# Patient Record
Sex: MALE | Race: WHITE | NOT HISPANIC OR LATINO | Employment: FULL TIME | URBAN - METROPOLITAN AREA
[De-identification: names, ages, dates, MRNs, and addresses within clinical notes are randomized per-mention and may not be internally consistent; named-entity substitution may affect disease eponyms.]

---

## 2017-10-23 ENCOUNTER — RADIANT APPOINTMENT (OUTPATIENT)
Dept: GENERAL RADIOLOGY | Facility: CLINIC | Age: 47
End: 2017-10-23
Attending: INTERNAL MEDICINE
Payer: OTHER MISCELLANEOUS

## 2017-10-23 ENCOUNTER — OFFICE VISIT (OUTPATIENT)
Dept: INTERNAL MEDICINE | Facility: CLINIC | Age: 47
End: 2017-10-23
Payer: OTHER MISCELLANEOUS

## 2017-10-23 VITALS
DIASTOLIC BLOOD PRESSURE: 78 MMHG | OXYGEN SATURATION: 96 % | TEMPERATURE: 97 F | WEIGHT: 282 LBS | HEART RATE: 74 BPM | HEIGHT: 73 IN | SYSTOLIC BLOOD PRESSURE: 135 MMHG | BODY MASS INDEX: 37.37 KG/M2

## 2017-10-23 DIAGNOSIS — M25.521 RIGHT ELBOW PAIN: ICD-10-CM

## 2017-10-23 DIAGNOSIS — M79.645 PAIN OF LEFT MIDDLE FINGER: ICD-10-CM

## 2017-10-23 DIAGNOSIS — M25.511 RIGHT SHOULDER PAIN, UNSPECIFIED CHRONICITY: ICD-10-CM

## 2017-10-23 DIAGNOSIS — M25.511 RIGHT SHOULDER PAIN, UNSPECIFIED CHRONICITY: Primary | ICD-10-CM

## 2017-10-23 PROCEDURE — 99203 OFFICE O/P NEW LOW 30 MIN: CPT | Performed by: INTERNAL MEDICINE

## 2017-10-23 PROCEDURE — 73030 X-RAY EXAM OF SHOULDER: CPT | Mod: RT

## 2017-10-23 PROCEDURE — 73070 X-RAY EXAM OF ELBOW: CPT | Mod: RT

## 2017-10-23 RX ORDER — KETOROLAC TROMETHAMINE 10 MG/1
10 TABLET, FILM COATED ORAL EVERY 6 HOURS PRN
Qty: 20 TABLET | Refills: 0 | Status: SHIPPED | OUTPATIENT
Start: 2017-10-23 | End: 2017-11-10

## 2017-10-23 NOTE — PATIENT INSTRUCTIONS
Please take the Toradol per package instructions. Do not take Aleve (naproxen), aspirin, ibuprofen (Motrin, Advil) or Excedrin on the same day that you take the Toradol.  Take it with food or milk and please drink enough fluids, 6-8 cups of non-caffeinated beverages a day.    We will let you know your test results as discussed: by phone for urgent results, otherwise by postal mail.    Let us know if your right shoulder, right elbow and left middle finger are not better in 2 weeks-we will likely then need to refer you to Orthopedics at that point.

## 2017-10-23 NOTE — PROGRESS NOTES
"  SUBJECTIVE:   Nish Cotto is a 47 year old male who presents to clinic today for the following health issues:      Musculoskeletal problem/pain-right shoulder    Duration: has had problems for years. It just got worse the last month, when the patient fell off the truck and hit his right elbow.     Description  Location: right     Intensity:  severe    Accompanying signs and symptoms: none    History  Previous similar problem: YES  Previous evaluation:  x-ray, ultrasound and MRI- torn rotator cuff was dxed for both shoulders.    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 Has not tried over the counter medications.    Weakness of the right arm-worse with exertion. Pain sometimes wakes patient up. No neck pain.   No numbness.     The elbow still hurts.  Right shoulder: had a \"blood patch\"-per patient, had \"blood injected into the shoulder to create scar tissue.\" Has not needed a steroid shot into the right  shoulder for years. Also, the *left* middle finger hurts. This finger has started to look slightly hyperflexed. No noted injury  No f/c.   No rashes.  No joint stiffness.       declined tetanus shot today.       Reviewed and updated as needed this visit by clinical staff  Tobacco  Allergies  Meds  Problems  Med Hx  Surg Hx  Fam Hx  Soc Hx        Reviewed and updated as needed this visit by Provider  Allergies  Meds  Problems         Patient Active Problem List   Diagnosis     Right shoulder pain, unspecified chronicity       History reviewed. No pertinent past medical history.    History reviewed. No pertinent surgical history.    Family History   Problem Relation Age of Onset     CANCER Father      Dementia Maternal Grandfather      CANCER Maternal Grandfather      CANCER Paternal Grandfather        Social History   Substance Use Topics     " "Smoking status: Former Smoker     Quit date: 10/23/2015     Smokeless tobacco: Never Used     Alcohol use Yes       Current Outpatient Prescriptions   Medication     ketorolac (TORADOL) 10 MG tablet     No current facility-administered medications for this visit.          ROS:  Constitutional, HEENT, cardiovascular, pulmonary, GI, , musculoskeletal, neuro, skin, endocrine and psych systems are negative, except as otherwise noted.     OBJECTIVE:                                                    /78  Pulse 74  Temp 97  F (36.1  C) (Oral)  Ht 6' 1.25\" (1.861 m)  Wt 282 lb (127.9 kg)  SpO2 96%  BMI 36.95 kg/m2     GENERAL APPEARANCE: healthy, alert and in no distress    MS:   right upper extremity:  Shoulder ROM was complete and painless, EXCEPT for pain on external rotation of the right shoulder.   Negative AC joint compression sign, no subacromial bursa tenderness, arm drop test within normal limits.  There was DECREASED strength on external rotation of the shoulder, with the patient's arms at their sides and the elbows in 90 degrees of flexion. No biceps tendon insertion tenderness. Spurling's test negative.  Phalen's sign was negative  Mild hyperextension of the 3rd digit of the left hand,w/o signs of inflammation  o.w., no musculoskeletal defects are noted and gait is age appropriate without ataxia  SKIN: no suspicious lesions or rashes  NEURO: mentation intact and speech normal  PSYCH: mentation appears normal and affect normal/bright.    No results found for this or any previous visit.    Recent Results (from the past 744 hour(s))   XR Shoulder Right G/E 3 Views    Narrative    XR SHOULDER RT G/E 3 VW 10/23/2017 9:36 AM    HISTORY: Pain in right shoulder      Impression    IMPRESSION: Negative.    JEFFERY HOWELL MD   XR Elbow Right 2 Views    Narrative    XR ELBOW RT 2 VW 10/23/2017 9:36 AM    HISTORY: Pain in right elbow      Impression    IMPRESSION: Negative.    JEFFERY HOWELL MD "       ASSESSMENT/PLAN:                                                        ICD-10-CM    1. Right shoulder pain, unspecified chronicity M25.511 XR Shoulder Right G/E 3 Views     ketorolac (TORADOL) 10 MG tablet   2. Right elbow pain M25.521 XR Elbow Right 2 Views     ketorolac (TORADOL) 10 MG tablet   3. Pain of left middle finger M79.645 ketorolac (TORADOL) 10 MG tablet     ASSESSMENT:  right upper extremity rotator cuff tendinopathy  PLAN:  As per orders above and patient instructions below.     may consider workup for Rheum disease should MSK issues persist    Patient Instructions   Please take the Toradol per package instructions. Do not take Aleve (naproxen), aspirin, ibuprofen (Motrin, Advil) or Excedrin on the same day that you take the Toradol.  Take it with food or milk and please drink enough fluids, 6-8 cups of non-caffeinated beverages a day.    We will let you know your test results as discussed: by phone for urgent results, otherwise by postal mail.    Let us know if your right shoulder, right elbow and left middle finger are not better in 2 weeks-we will likely then need to refer you to Orthopedics at that point.       Bertha Yip MD    [facility]  [address] [facility] [address] [phone]

## 2017-10-23 NOTE — NURSING NOTE
"Chief Complaint   Patient presents with     Shoulder Pain     has had problems in the past.       Initial /78  Pulse 74  Temp 97  F (36.1  C) (Oral)  Ht 6' 1.25\" (1.861 m)  Wt 282 lb (127.9 kg)  SpO2 96%  BMI 36.95 kg/m2 Estimated body mass index is 36.95 kg/(m^2) as calculated from the following:    Height as of this encounter: 6' 1.25\" (1.861 m).    Weight as of this encounter: 282 lb (127.9 kg).  Medication Reconciliation: complete    Jovita Corley CMA  "

## 2017-10-23 NOTE — LETTER
October 27, 2017      Nish Cotto  [address] [address] [place] [id number]        Dear ,    We are writing to inform you of your test results.    Your test results fall within the expected range(s) or remain unchanged from previous results.  Please continue with current treatment plan.    Resulted Orders   XR Shoulder Right G/E 3 Views    Narrative    XR SHOULDER RT G/E 3 VW 10/23/2017 9:36 AM    HISTORY: Pain in right shoulder      Impression    IMPRESSION: Negative.    JEFFERY HOWELL MD       If you have any questions or concerns, please call the clinic at the number listed above.       Sincerely,        [facility] XRAY ROOM 1

## 2017-10-23 NOTE — MR AVS SNAPSHOT
After Visit Summary   10/23/2017    Nish Cotto    MRN: [id number]           Patient Information     Date Of Birth          1970        Visit Information        Provider Department      10/23/2017 8:10 AM Bertha Yip MD [facility]        Today's Diagnoses     Right shoulder pain, unspecified chronicity    -  1    Right elbow pain        Pain of left middle finger          Care Instructions    Please take the Toradol per package instructions. Do not take Aleve (naproxen), aspirin, ibuprofen (Motrin, Advil) or Excedrin on the same day that you take the Toradol.  Take it with food or milk and please drink enough fluids, 6-8 cups of non-caffeinated beverages a day.    We will let you know your test results as discussed: by phone for urgent results, otherwise by postal mail.    Let us know if your right shoulder, right elbow and left middle finger are not better in 2 weeks-we will likely then need to refer you to Orthopedics at that point.           Follow-ups after your visit        Future tests that were ordered for you today     Open Future Orders        Priority Expected Expires Ordered    XR Shoulder Right G/E 3 Views Routine 10/23/2017 10/23/2018 10/23/2017    XR Elbow Right 2 Views Routine 10/23/2017 10/23/2018 10/23/2017            Who to contact     If you have questions or need follow up information about today's clinic visit or your schedule please contact [facility] directly at [phone].  Normal or non-critical lab and imaging results will be communicated to you by MyChart, letter or phone within 4 business days after the clinic has received the results. If you do not hear from us within 7 days, please contact the clinic through MyChart or phone. If you have a critical or abnormal lab result, we will notify you by phone as soon as possible.  Submit refill requests through [employer] or call your pharmacy and they will forward the refill request  "to us. Please allow 3 business days for your refill to be completed.          Additional Information About Your Visit        [employer] Information     [employer] lets you send messages to your doctor, view your test results, renew your prescriptions, schedule appointments and more. To sign up, go to [email or web address] . Click on \"Log in\" on the left side of the screen, which will take you to the Welcome page. Then click on \"Sign up Now\" on the right side of the page.     You will be asked to enter the access code listed below, as well as some personal information. Please follow the directions to create your username and password.     Your access code is: [email or web address]  Expires: 2018  8:46 AM     Your access code will  in 90 days. If you need help or a new code, please call your [facility] clinic or [phone].        Care EveryWhere ID     This is your [facility] EveryWhere ID. This could be used by other organizations to access your [facility] medical records  [id number]        Your Vitals Were     Pulse Temperature Height Pulse Oximetry BMI (Body Mass Index)       74 97  F (36.1  C) (Oral) 6' 1.25\" (1.861 m) 96% 36.95 kg/m2        Blood Pressure from Last 3 Encounters:   10/23/17 135/78    Weight from Last 3 Encounters:   10/23/17 282 lb (127.9 kg)                 Today's Medication Changes          These changes are accurate as of: 10/23/17  8:46 AM.  If you have any questions, ask your nurse or doctor.               Start taking these medicines.        Dose/Directions    ketorolac 10 MG tablet   Commonly known as:  TORADOL   Used for:  Right elbow pain, Right shoulder pain, unspecified chronicity, Pain of left middle finger   Started by:  Bertha Yip MD        Dose:  10 mg   Take 1 tablet (10 mg) by mouth every 6 hours as needed   Quantity:  20 tablet   Refills:  0            Where to get your medicines      These medications were sent to [facility]/pharmacy #[id number] - [place] - [id number] [address]   [address] [address] , [place] [address]     Phone:  [phone]     ketorolac 10 MG tablet                Primary Care Provider    Physician No Ref-Primary       NO REF-PRIMARY PHYSICIAN        Equal Access to Services     AMRIK ZIEGLER : Hadii ayad barbosa perlao Sokennyali, waaxda luqadaha, qaybta kaalmada adejpyada, woody paredeswellington ralphjp fisher lacie anthony. So [facility] [phone].    ATENCIÓN: Si habla español, tiene a herndon disposición servicios gratuitos de asistencia lingüística. Llame al [phone].    We comply with applicable federal civil rights laws and [place] laws. We do not discriminate on the basis of race, color, national origin, age, disability, sex, sexual orientation, or gender identity.            Thank you!     Thank you for choosing [facility]  [facility]. Our goal is always to provide you with excellent care. Hearing back from our patients is one way we can continue to improve our services. Please take a few minutes to complete the written survey that you may receive in the mail after your visit with us. Thank you!             Your Updated Medication List - Protect others around you: Learn how to safely use, store and throw away your medicines at www.disposemymeds.org.          This list is accurate as of: 10/23/17  8:46 AM.  Always use your most recent med list.                   Brand Name Dispense Instructions for use Diagnosis    ketorolac 10 MG tablet    TORADOL    20 tablet    Take 1 tablet (10 mg) by mouth every 6 hours as needed    Right elbow pain, Right shoulder pain, unspecified chronicity, Pain of left middle finger

## 2017-11-08 NOTE — PROGRESS NOTES
SUBJECTIVE:   Nish Cotto is a 47 year old male who presents to clinic today for the following health issues:      Joint Pain    Onset: 2 months ago    Description:   Location: right shoulder, right elbow and left wrist  Character: Sharp    Intensity: moderate, severe    Progression of Symptoms: worse    Accompanying Signs & Symptoms:  Other symptoms: radiation of pain to right elbow     History:   Previous similar pain: YES      Precipitating factors:   Trauma or overuse: YES    Alleviating factors:  Improved by: nothing    Therapies Tried and outcome: toradol, rxed 2 weeks ago, didn't help at all.     The right elbow is now bothering the patient more than the right shoulder.   Patient was first seen by me 2 weeks ago, assessed as primarily having right upper extremity rotator cuff tendinopathy (the right elbow pain at that point was not his chief complaint) and rxed Toradol-without any benefit. XR of the R elbow and R shoulder were within normal limits.  He now describes a pain which originated at or near the medial epicondyle area where the ulnar nerve passes and that the pain radiates down the right arm; He does also have some weakness of that arm, which is not necessarily d/t the pain.  He denies numbness of the right upper extremity.  On a scale of 1/10, the shoulder pain is 5-6/10; the right elbow pain: 7-8/10.   No new trauma to the right arm in the past 2 weeks.    The patient plans to get  in the first part of Jan 2018, and he would like to get this issue under control by then.    Patient had MRIs of his right shoulder in 2005.    The patient works as a  and he will be laid off in a week-financial difficulties aside, this would allow us to try more medications in order to control his pain.       Reviewed and updated as needed this visit by clinical staffTobacco  Allergies  Meds  Problems  Med Hx  Surg Hx  Fam Hx  Soc Hx        Reviewed and updated as needed this visit by  "Provider  Meds  Problems           Patient Active Problem List   Diagnosis     Right shoulder pain, unspecified chronicity       History reviewed. No pertinent past medical history.    History reviewed. No pertinent surgical history.    Family History   Problem Relation Age of Onset     CANCER Father      Dementia Maternal Grandfather      CANCER Maternal Grandfather      CANCER Paternal Grandfather        Social History   Substance Use Topics     Smoking status: Former Smoker     Quit date: 10/23/2015     Smokeless tobacco: Never Used     Alcohol use Yes       Current Outpatient Prescriptions   Medication     methylPREDNISolone (MEDROL DOSEPAK) 4 MG tablet     gabapentin (NEURONTIN) 300 MG capsule     No current facility-administered medications for this visit.          ROS:  Constitutional, HEENT, cardiovascular, pulmonary, GI, , musculoskeletal, neuro, skin, endocrine and psych systems are negative, except as otherwise noted.     OBJECTIVE:                                                    /89  Pulse 70  Temp 97  F (36.1  C) (Oral)  Ht 6' 1.25\" (1.861 m)  Wt 285 lb (129.3 kg)  SpO2 98%  BMI 37.34 kg/m2     GENERAL APPEARANCE: healthy, alert and in no distress  EYES: Eyes grossly normal to inspection, and conjunctivae and sclerae normal  HENT: head normocephalic and atraumatic and mouth without ulcers or lesions, oropharynx clear and oral mucous membranes moist  NECK: no noticeable adenopathy, no asymmetry, masses, or scars   RESP: lungs clear to auscultation - no rales, rhonchi or wheezes  CV: regular rate and rhythm, normal S1 S2, no S3 or S4, no murmur, click or rub, no peripheral edema and peripheral pulses strong  ABDOMEN: soft, nontender, no hepatosplenomegaly, no masses and bowel sounds normal  MS: no musculoskeletal defects are noted and gait is age appropriate without ataxia  SKIN: no suspicious lesions or rashes  NEURO: mentation intact and speech normal  PSYCH: mentation appears normal " and affect normal/bright.    Results for orders placed or performed in visit on 10/23/17   XR Elbow Right 2 Views    Narrative    XR ELBOW RT 2 VW 10/23/2017 9:36 AM    HISTORY: Pain in right elbow      Impression    IMPRESSION: Negative.    JEFFERY HOWELL MD       Recent Results (from the past 744 hour(s))   XR Shoulder Right G/E 3 Views    Narrative    XR SHOULDER RT G/E 3 VW 10/23/2017 9:36 AM    HISTORY: Pain in right shoulder      Impression    IMPRESSION: Negative.    JEFFERY HOWELL MD   XR Elbow Right 2 Views    Narrative    XR ELBOW RT 2 VW 10/23/2017 9:36 AM    HISTORY: Pain in right elbow      Impression    IMPRESSION: Negative.    JEFFERY HOWELL MD         ASSESSMENT/PLAN:                                                        ICD-10-CM    1. Right elbow pain M25.521 MR Shoulder Right w/o Contrast     MR Elbow Right w/o Contrast     methylPREDNISolone (MEDROL DOSEPAK) 4 MG tablet     gabapentin (NEURONTIN) 300 MG capsule   2. Acute pain of right shoulder M25.511 MR Shoulder Right w/o Contrast     methylPREDNISolone (MEDROL DOSEPAK) 4 MG tablet     gabapentin (NEURONTIN) 300 MG capsule     ASSESSMENT:   Ulnar neuropathy plus right upper extremity rotator cuff tendinopathy  PLAN:    Patient Instructions   Please call [phone] to schedule the MRI soon. We will provide further instructions based on the MRI results.   Take the Medrol dosepack as per prescription instructions-please take it early in the day, with food.  Start taking Gabapentin 300mg at bedtime; if your pain is not better in a week, increase to 600mg at bedtime.       Bertha Yip MD    [facility]  [address] [facility] [address] [phone]

## 2017-11-10 ENCOUNTER — OFFICE VISIT (OUTPATIENT)
Dept: INTERNAL MEDICINE | Facility: CLINIC | Age: 47
End: 2017-11-10
Payer: OTHER MISCELLANEOUS

## 2017-11-10 VITALS
WEIGHT: 285 LBS | BODY MASS INDEX: 37.77 KG/M2 | HEART RATE: 70 BPM | SYSTOLIC BLOOD PRESSURE: 139 MMHG | DIASTOLIC BLOOD PRESSURE: 89 MMHG | OXYGEN SATURATION: 98 % | HEIGHT: 73 IN | TEMPERATURE: 97 F

## 2017-11-10 DIAGNOSIS — M25.521 RIGHT ELBOW PAIN: Primary | ICD-10-CM

## 2017-11-10 DIAGNOSIS — M25.511 ACUTE PAIN OF RIGHT SHOULDER: ICD-10-CM

## 2017-11-10 PROCEDURE — 99213 OFFICE O/P EST LOW 20 MIN: CPT | Performed by: INTERNAL MEDICINE

## 2017-11-10 RX ORDER — METHYLPREDNISOLONE 4 MG
TABLET, DOSE PACK ORAL
Qty: 21 TABLET | Refills: 0 | Status: SHIPPED | OUTPATIENT
Start: 2017-11-10 | End: 2018-02-08

## 2017-11-10 RX ORDER — GABAPENTIN 300 MG/1
CAPSULE ORAL
Qty: 60 CAPSULE | Refills: 1 | Status: SHIPPED | OUTPATIENT
Start: 2017-11-10 | End: 2018-02-08

## 2017-11-10 NOTE — PATIENT INSTRUCTIONS
Please call [phone] to schedule the MRI soon. We will provide further instructions based on the MRI results.   Take the Medrol dosepack as per prescription instructions-please take it early in the day, with food.  Start taking Gabapentin 300mg at bedtime; if your pain is not better in a week, increase to 600mg at bedtime.

## 2017-11-10 NOTE — MR AVS SNAPSHOT
After Visit Summary   11/10/2017    Nish Cotto    MRN: [id number]           Patient Information     Date Of Birth          1970        Visit Information        Provider Department      11/10/2017 7:50 AM Bertha Yip MD [facility]        Today's Diagnoses     Right elbow pain    -  1    Acute pain of right shoulder          Care Instructions    Please call [phone] to schedule the MRI soon. We will provide further instructions based on the MRI results.   Take the Medrol dosepack as per prescription instructions-please take it early in the day, with food.  Start taking Gabapentin 300mg at bedtime; if your pain is not better in a week, increase to 600mg at bedtime.           Follow-ups after your visit        Future tests that were ordered for you today     Open Future Orders        Priority Expected Expires Ordered    MR Shoulder Right w/o Contrast Routine  11/10/2018 11/10/2017    MR Elbow Right w/o Contrast Routine  11/10/2018 11/10/2017            Who to contact     If you have questions or need follow up information about today's clinic visit or your schedule please contact [facility] directly at [phone].  Normal or non-critical lab and imaging results will be communicated to you by MyChart, letter or phone within 4 business days after the clinic has received the results. If you do not hear from us within 7 days, please contact the clinic through [employer] or phone. If you have a critical or abnormal lab result, we will notify you by phone as soon as possible.  Submit refill requests through [employer] or call your pharmacy and they will forward the refill request to us. Please allow 3 business days for your refill to be completed.          Additional Information About Your Visit        MyChart Information     [employer] lets you send messages to your doctor, view your test results, renew your prescriptions, schedule appointments and more. To sign  "up, go to [email or web address] . Click on \"Log in\" on the left side of the screen, which will take you to the Welcome page. Then click on \"Sign up Now\" on the right side of the page.     You will be asked to enter the access code listed below, as well as some personal information. Please follow the directions to create your username and password.     Your access code is: [email or web address]  Expires: 2018  7:46 AM     Your access code will  in 90 days. If you need help or a new code, please call your [place] clinic or [phone].        Care EveryWhere ID     This is your Care EveryWhere ID. This could be used by other organizations to access your [place] medical records  [id number]        Your Vitals Were     Pulse Temperature Height Pulse Oximetry BMI (Body Mass Index)       70 97  F (36.1  C) (Oral) 6' 1.25\" (1.861 m) 98% 37.34 kg/m2        Blood Pressure from Last 3 Encounters:   11/10/17 139/89   10/23/17 135/78    Weight from Last 3 Encounters:   11/10/17 285 lb (129.3 kg)   10/23/17 282 lb (127.9 kg)                 Today's Medication Changes          These changes are accurate as of: 11/10/17  8:25 AM.  If you have any questions, ask your nurse or doctor.               Start taking these medicines.        Dose/Directions    gabapentin 300 MG capsule   Commonly known as:  NEURONTIN   Used for:  Right elbow pain, Acute pain of right shoulder   Started by:  Bertha Yip MD        Take 1 capsule (300mg) at bedtime.   Quantity:  60 capsule   Refills:  1       methylPREDNISolone 4 MG tablet   Commonly known as:  MEDROL DOSEPAK   Used for:  Acute pain of right shoulder, Right elbow pain   Started by:  Bertha Yip MD        Follow package instructions   Quantity:  21 tablet   Refills:  0         Stop taking these medicines if you haven't already. Please contact your care team if you have questions.     ketorolac 10 MG tablet   Commonly known as:  TORADOL   Stopped by:  " Bertha Yip MD                Where to get your medicines      These medications were sent to [facility]/pharmacy #[id number] - [place], [place] - [address]   [address] [address] [place] [address]     Phone:  [phone]     gabapentin 300 MG capsule    methylPREDNISolone 4 MG tablet                Primary Care Provider Office Phone # Fax #    Bertha Yip [phone] [address] [facility] [address]        Equal Access to Services     [facility]: Hadii aad ku hadasho Soomaali, waaxda luqadaha, qaybta kaalmada adeegyada, woody medellin . So [facility] [phone].    ATENCIÓN: Si habla español, tiene a herndon disposición servicios gratuitos de asistencia lingüística. Llame al [phone].    We comply with applicable federal civil rights laws and [place] laws. We do not discriminate on the basis of race, color, national origin, age, disability, sex, sexual orientation, or gender identity.            Thank you!     Thank you for choosing [facility]  for your care. Our goal is always to provide you with excellent care. Hearing back from our patients is one way we can continue to improve our services. Please take a few minutes to complete the written survey that you may receive in the mail after your visit with us. Thank you!             Your Updated Medication List - Protect others around you: Learn how to safely use, store and throw away your medicines at www.disposemymeds.org.          This list is accurate as of: 11/10/17  8:25 AM.  Always use your most recent med list.                   Brand Name Dispense Instructions for use Diagnosis    gabapentin 300 MG capsule    NEURONTIN    60 capsule    Take 1 capsule (300mg) at bedtime.    Right elbow pain, Acute pain of right shoulder       methylPREDNISolone 4 MG tablet    MEDROL DOSEPAK    21 tablet    Follow package instructions    Acute pain of right  shoulder, Right elbow pain

## 2017-11-10 NOTE — NURSING NOTE
"Chief Complaint   Patient presents with     Shoulder Pain       Initial /89  Pulse 70  Temp 97  F (36.1  C) (Oral)  Ht 6' 1.25\" (1.861 m)  Wt 285 lb (129.3 kg)  SpO2 98%  BMI 37.34 kg/m2 Estimated body mass index is 37.34 kg/(m^2) as calculated from the following:    Height as of this encounter: 6' 1.25\" (1.861 m).    Weight as of this encounter: 285 lb (129.3 kg).  Medication Reconciliation: complete  "

## 2017-11-14 ENCOUNTER — RADIANT APPOINTMENT (OUTPATIENT)
Dept: MRI IMAGING | Facility: CLINIC | Age: 47
End: 2017-11-14
Attending: INTERNAL MEDICINE
Payer: OTHER MISCELLANEOUS

## 2017-11-14 ENCOUNTER — TELEPHONE (OUTPATIENT)
Dept: INTERNAL MEDICINE | Facility: CLINIC | Age: 47
End: 2017-11-14

## 2017-11-14 DIAGNOSIS — M25.511 ACUTE PAIN OF RIGHT SHOULDER: ICD-10-CM

## 2017-11-14 DIAGNOSIS — M25.521 RIGHT ELBOW PAIN: ICD-10-CM

## 2017-11-14 PROCEDURE — 73221 MRI JOINT UPR EXTREM W/O DYE: CPT | Mod: TC

## 2017-11-15 NOTE — TELEPHONE ENCOUNTER
"Please call and advise the patient as follows, and also send a [employer] message with the same text and attach recent test results-2 MRIs [statements which are in bold and in square brackets, like this sentence, is for clinic staff and should not be included in the text of the letter to the patient]:      Dear Nish,     The MRI of your right elbow shows an inflammation of the 2 tendons around the elbow.  The MRI of your right shoulder shows that there is almost a complete tear of one of the muscles in the \"rotator cuff\" group.  Please call  [phone] to schedule an appointment with the Orthopedic surgeon soon, for optimal management of this condition.    Please call our clinic at [phone] if you have any questions.    Bertha Yip MD   "

## 2017-11-15 NOTE — TELEPHONE ENCOUNTER
Called patient and gave providers message/ instructions.  Patient understands this.   He requested that RN call him back and leave details of results and scheduling number.  This RN did this. Also gave him name, address, hours, and phone number for [facility] so patient can be seen sooner there if needed.     Antonina Abdullahi, RN

## 2017-11-16 ENCOUNTER — OFFICE VISIT (OUTPATIENT)
Dept: ORTHOPEDICS | Facility: CLINIC | Age: 47
End: 2017-11-16
Payer: OTHER MISCELLANEOUS

## 2017-11-16 VITALS — WEIGHT: 280 LBS | HEIGHT: 73 IN | BODY MASS INDEX: 37.11 KG/M2 | TEMPERATURE: 97.6 F | RESPIRATION RATE: 18 BRPM

## 2017-11-16 DIAGNOSIS — M77.01 MEDIAL EPICONDYLITIS OF ELBOW, RIGHT: Primary | ICD-10-CM

## 2017-11-16 DIAGNOSIS — M75.41 IMPINGEMENT SYNDROME OF RIGHT SHOULDER: ICD-10-CM

## 2017-11-16 DIAGNOSIS — M75.121 COMPLETE TEAR OF RIGHT ROTATOR CUFF: ICD-10-CM

## 2017-11-16 DIAGNOSIS — M19.019 AC (ACROMIOCLAVICULAR) JOINT ARTHRITIS: ICD-10-CM

## 2017-11-16 PROCEDURE — 99244 OFF/OP CNSLTJ NEW/EST MOD 40: CPT | Performed by: ORTHOPAEDIC SURGERY

## 2017-11-16 NOTE — PATIENT INSTRUCTIONS
Rotator cuff repair is done in same day surgery.    Preop physical with primary physician is needed within 30 days of the surgery.  Nothing to eat or drink for 8 hours before surgery.  For same day surgery you need a ride.  Someone should stay with you for 12 hours afterward.  Stop blood thinners 5 days before surgery (aspirin, warfarin, anti-inflammatories).    General anesthesia is used.  They often perform a pain block at the neck to help with pain.  Sutures are in the wound.  Keep wound dry until clinic appointment at 1 1/2 weeks.     Physical Therapy will start after first clinic visit.  0-3 weeks.  Arm in sling or immobilizer.  No reaching with operative arm.   Okay to have arm out to dangle or bend elbow.    3-6 weeks.  Discontinue immobilizer.  Start reaching.  No lifting over 1 lb.  6-12 weeks  Work on strengthening.  1 year to full recovery.    Call [phone] to schedule your surgery.

## 2017-11-16 NOTE — LETTER
11/16/2017         RE: Grabiel Cotto  [address] [address] [place] [address]        Dear Colleague,    Thank you for referring your patient, Grabiel Cotto, to the [facility]. Please see a copy of my visit note below.    Grabiel Cotto is a 47 year old male who is seen in consultation at the request of Dr. Bertha Yip for right shoulder and elbow injury at work 8/9/17    History reviewed. No pertinent past medical history.    History reviewed. No pertinent surgical history.    Family History   Problem Relation Age of Onset     CANCER Father      Dementia Maternal Grandfather      CANCER Maternal Grandfather      CANCER Paternal Grandfather        Social History     Social History     Marital status:      Spouse name: N/A     Number of children: N/A     Years of education: N/A     Occupational History     Not on file.     Social History Main Topics     Smoking status: Former Smoker     Quit date: 10/23/2015     Smokeless tobacco: Never Used     Alcohol use Yes     Drug use: No     Sexual activity: Not on file     Other Topics Concern     Not on file     Social History Narrative       Current Outpatient Prescriptions   Medication Sig Dispense Refill     methylPREDNISolone (MEDROL DOSEPAK) 4 MG tablet Follow package instructions 21 tablet 0     gabapentin (NEURONTIN) 300 MG capsule Take 1 capsule (300mg) at bedtime. 60 capsule 1       No Known Allergies    REVIEW OF SYSTEMS:  CONSTITUTIONAL:  NEGATIVE for fever, chills, change in weight, not feeling tired  SKIN:  NEGATIVE for worrisome rashes, no skin lumps, no skin ulcers and no non-healing wounds  EYES:  NEGATIVE for vision changes or irritation.  ENT/MOUTH:  NEGATIVE.  No hearing loss, no hoarseness, no difficulty swallowing.  RESP:  NEGATIVE. No cough or shortness of breath.  CV:  NEGATIVE for chest pain, palpitations or peripheral edema  GI:  NEGATIVE for nausea, abdominal pain, heartburn, or change in bowel habits  :  Negative. No dysuria,  "no hematuria  MUSCULOSKELETAL:  See HPI above  NEURO:  NEGATIVE . No headaches, no dizziness,  no numbness  ENDOCRINE:  NEGATIVE for temperature intolerance, skin/hair changes  HEME/ALLERGY/IMMUNE:  NEGATIVE for bleeding problems  PSYCHIATRIC:  NEGATIVE. no anxiety, no depression.     Exam:  Vitals: Temp 97.6  F (36.4  C)  Resp 18  Ht 1.861 m (6' 1.25\")  Wt 127 kg (280 lb)  BMI 36.69 kg/m2  BMI= Body mass index is 36.69 kg/(m^2).  Constitutional:  healthy, alert and no distress  Neuro: Alert and Oriented x 3, Gait normal. Sensation grossly WNL.  Psych: Affect normal   Respiratory: Breathing not labored.  Cardiovascular: normal peripheral pulses  Lymph: no adenopathy  Skin: No rashes,worrisome lesions or skin problems      Again, thank you for allowing me to participate in the care of your patient.        Sincerely,        Durga Gomez MD    "

## 2017-11-16 NOTE — NURSING NOTE
"Chief Complaint   Patient presents with     Consult     Right shoulder and elbow injury on 8/9/17 fell off truck in [place]. Pain level 7/10 sharp, shooting, achy, dull and constant. Rest makes the pain better and use makes the pain worse.       Initial Temp 97.6  F (36.4  C)  Resp 18  Ht 1.861 m (6' 1.25\")  Wt 127 kg (280 lb)  BMI 36.69 kg/m2 Estimated body mass index is 36.69 kg/(m^2) as calculated from the following:    Height as of this encounter: 1.861 m (6' 1.25\").    Weight as of this encounter: 127 kg (280 lb).  Medication Reconciliation: gabby Chavez MA      "

## 2017-11-16 NOTE — MR AVS SNAPSHOT
After Visit Summary   11/16/2017    Grabiel Cotto    MRN: [id number]           Patient Information     Date Of Birth          1970        Visit Information        Provider Department      11/16/2017 8:15 AM Durga Gomez MD [facility]        Care Instructions    Rotator cuff repair is done in same day surgery.    Preop physical with primary physician is needed within 30 days of the surgery.  Nothing to eat or drink for 8 hours before surgery.  For same day surgery you need a ride.  Someone should stay with you for 12 hours afterward.  Stop blood thinners 5 days before surgery (aspirin, warfarin, anti-inflammatories).    General anesthesia is used.  They often perform a pain block at the neck to help with pain.  Sutures are in the wound.  Keep wound dry until clinic appointment at 1 1/2 weeks.     Physical Therapy will start after first clinic visit.  0-3 weeks.  Arm in sling or immobilizer.  No reaching with operative arm.   Okay to have arm out to dangle or bend elbow.    3-6 weeks.  Discontinue immobilizer.  Start reaching.  No lifting over 1 lb.  6-12 weeks  Work on strengthening.  1 year to full recovery.    Call [phone] to schedule your surgery.          Follow-ups after your visit        Who to contact     If you have questions or need follow up information about today's clinic visit or your schedule please contact [facility] directly at [phone].  Normal or non-critical lab and imaging results will be communicated to you by MyChart, letter or phone within 4 business days after the clinic has received the results. If you do not hear from us within 7 days, please contact the clinic through [employer] or phone. If you have a critical or abnormal lab result, we will notify you by phone as soon as possible.  Submit refill requests through [employer] or call your pharmacy and they will forward the refill request to us. Please allow 3 business days for your  "refill to be completed.          Additional Information About Your Visit        [employer] Information     [facility] lets you send messages to your doctor, view your test results, renew your prescriptions, schedule appointments and more. To sign up, go to [email or web address] . Click on \"Log in\" on the left side of the screen, which will take you to the Welcome page. Then click on \"Sign up Now\" on the right side of the page.     You will be asked to enter the access code listed below, as well as some personal information. Please follow the directions to create your username and password.     Your access code is: [email or web address]  Expires: 2018  7:46 AM     Your access code will  in 90 days. If you need help or a new code, please call your [place] clinic or [phone].        Care EveryWhere ID     This is your Care EveryWhere ID. This could be used by other organizations to access your [place] medical records  [id number]        Your Vitals Were     Temperature Respirations Height BMI (Body Mass Index)          97.6  F (36.4  C) 18 1.861 m (6' 1.25\") 36.69 kg/m2         Blood Pressure from Last 3 Encounters:   11/10/17 139/89   10/23/17 135/78    Weight from Last 3 Encounters:   17 127 kg (280 lb)   11/10/17 129.3 kg (285 lb)   10/23/17 127.9 kg (282 lb)              Today, you had the following     No orders found for display       Primary Care Provider Office Phone # Fax #    Bertha Yip [phone] [address] [address] [facility] [address]        Equal Access to Services     [facility] KARLIE AH: Hadtimmy Lares, wagermán smith, vicki kaalmada kelsey, woody anthony. So [facility] [phone].    ATENCIÓN: Si habla español, tiene a herndon disposición servicios gratuitos de asistencia lingüística. Llame al [phone].    We comply with applicable federal civil rights laws and [place] laws. We do not discriminate on the basis of " race, color, national origin, age, disability, sex, sexual orientation, or gender identity.            Thank you!     Thank you for choosing [facility]  for your care. Our goal is always to provide you with excellent care. Hearing back from our patients is one way we can continue to improve our services. Please take a few minutes to complete the written survey that you may receive in the mail after your visit with us. Thank you!             Your Updated Medication List - Protect others around you: Learn how to safely use, store and throw away your medicines at www.disposemymeds.org.          This list is accurate as of: 11/16/17  8:50 AM.  Always use your most recent med list.                   Brand Name Dispense Instructions for use Diagnosis    gabapentin 300 MG capsule    NEURONTIN    60 capsule    Take 1 capsule (300mg) at bedtime.    Right elbow pain, Acute pain of right shoulder       methylPREDNISolone 4 MG tablet    MEDROL DOSEPAK    21 tablet    Follow package instructions    Acute pain of right shoulder, Right elbow pain

## 2017-11-16 NOTE — PROGRESS NOTES
Grabiel Cotto is a 47 year old male who is seen in consultation at the request of Dr. Bertha Yip for right shoulder and elbow injury at work 8/9/17    History reviewed. No pertinent past medical history.    History reviewed. No pertinent surgical history.    Family History   Problem Relation Age of Onset     CANCER Father      Dementia Maternal Grandfather      CANCER Maternal Grandfather      CANCER Paternal Grandfather        Social History     Social History     Marital status:      Spouse name: N/A     Number of children: N/A     Years of education: N/A     Occupational History     Not on file.     Social History Main Topics     Smoking status: Former Smoker     Quit date: 10/23/2015     Smokeless tobacco: Never Used     Alcohol use Yes     Drug use: No     Sexual activity: Not on file     Other Topics Concern     Not on file     Social History Narrative       Current Outpatient Prescriptions   Medication Sig Dispense Refill     methylPREDNISolone (MEDROL DOSEPAK) 4 MG tablet Follow package instructions 21 tablet 0     gabapentin (NEURONTIN) 300 MG capsule Take 1 capsule (300mg) at bedtime. 60 capsule 1       No Known Allergies    REVIEW OF SYSTEMS:  CONSTITUTIONAL:  NEGATIVE for fever, chills, change in weight, not feeling tired  SKIN:  NEGATIVE for worrisome rashes, no skin lumps, no skin ulcers and no non-healing wounds  EYES:  NEGATIVE for vision changes or irritation.  ENT/MOUTH:  NEGATIVE.  No hearing loss, no hoarseness, no difficulty swallowing.  RESP:  NEGATIVE. No cough or shortness of breath.  CV:  NEGATIVE for chest pain, palpitations or peripheral edema  GI:  NEGATIVE for nausea, abdominal pain, heartburn, or change in bowel habits  :  Negative. No dysuria, no hematuria  MUSCULOSKELETAL:  See HPI above  NEURO:  NEGATIVE . No headaches, no dizziness,  no numbness  ENDOCRINE:  NEGATIVE for temperature intolerance, skin/hair changes  HEME/ALLERGY/IMMUNE:  NEGATIVE for bleeding  "problems  PSYCHIATRIC:  NEGATIVE. no anxiety, no depression.     Exam:  Vitals: Temp 97.6  F (36.4  C)  Resp 18  Ht 1.861 m (6' 1.25\")  Wt 127 kg (280 lb)  BMI 36.69 kg/m2  BMI= Body mass index is 36.69 kg/(m^2).  Constitutional:  healthy, alert and no distress  Neuro: Alert and Oriented x 3, Gait normal. Sensation grossly WNL.  Psych: Affect normal   Respiratory: Breathing not labored.  Cardiovascular: normal peripheral pulses  Lymph: no adenopathy  Skin: No rashes,worrisome lesions or skin problems    "

## 2017-11-17 NOTE — PROGRESS NOTES
DATE OF VISIT:  11/16/2017      REFERRING PHYSICIAN:  Bertha Yip MD      HISTORY OF PRESENT ILLNESS:  Mr. Grabiel Cotto is a 47-year-old male referred from Dr. Bertha Yip for evaluation of right shoulder and elbow pain.  This started at work on 08/09/2017 when he fell off a truck in [facility].  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  He has had some persistent pain since then with sharp, dull, aching, shooting pains rated 7/10.  MRI scan of the elbow showed common extensor and common flexor tendinosis, but no evidence of fractures or tears.  The rotator cuff showed a tear of the supraspinatus and into the superior aspect of the subscapularis, with dislocation of the biceps tendon within the upper portion of the subscapularis.  There was a type 2 acromion and moderate degenerative changes of the AC joint.  He has persistent pain, primarily over the medial aspect of the elbow and with any use of the right shoulder.  He has been unable to work because of the shoulder pain.      PHYSICAL EXAMINATION:  Shows considerable pain with movement of the right shoulder.  He has no significant tenderness in the subacromial space.  He does have some tenderness at the AC joint.  He has significant and severe tenderness at the medial epicondyle of the elbow.  No tenderness of the lateral epicondyle.  He has pain and weakness with resisted external rotation of the right shoulder.  He has some pain with resisted internal rotation.  He has no significant pain with resisted abduction.  All these were negative on the left.  He had no pain in the elbow with resisted wrist flexion, extension or finger flexion, extension or resisted pronation, supination.  He had mainly tenderness at the medial epicondyle.  Sensation and circulation are intact to the arm.       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  There is bicipital subluxation into a subscapularis tear, and this is likely producing a good portion of the pain as well.  He also has likely contusion of the medial epicondyle as he has pain mainly at the epicondyle, not with use of the muscles and tendons.      PLAN:  We discussed options.  I feel, with the bicipital dislocation from the groove, that we need repair of the rotator cuff and either a relocation or bicipital tenodesis.  We would also perform distal clavicle excision and acromioplasty to decompress the shoulder and allow adequate healing.  This is under Work Comp, and we will need Work Comp approval.  It does not appear that the elbow will require any specific treatment other than time to let this heal.  He will be off work until this procedure is done.         ANA LILIA CASTELLANOS MD             D: 2017 11:34   T: 2017 21:50   MT:       Name:     FELIX MALIK   MRN:      [id number]        Account:      [id number]   :      1970           Visit Date:   2017      Document: [id number]       cc: Bertha Yip MD

## 2017-11-20 ENCOUNTER — TELEPHONE (OUTPATIENT)
Dept: ORTHOPEDICS | Facility: CLINIC | Age: 47
End: 2017-11-20

## 2017-11-20 NOTE — TELEPHONE ENCOUNTER
Patients letter was emailed to [email or web address]. And also left at the .    Clair Chavez MA

## 2017-11-20 NOTE — TELEPHONE ENCOUNTER
Patient states that he was seen lat week and was told he needed to be off work until surgery but did not get a letter for work stating this. Patients employer is requesting a letter stating this. Please call patient. Patient also request this be sent to his email at [email or web address]. Thank you.

## 2017-11-20 NOTE — LETTER
[address]  [place] [address]  Phone: [phone]    November 20, 2017        Grabiel Cotto  [address] [address] [place] [address]          To whom it may concern:    RE: Grabiel Spain was seen and treated in clinic by me on 11/16/17 for a work-related right rotator cuff tear sustained 8/9/17. I have recommended for Mr. Cotto to have surgery to repair this and we are currently waiting for workers compensation approval. A surgery date has not yet been scheduled. He is to be off work until his surgery.    Please contact me for questions or concerns.      Sincerely,        Durga Gomez MD

## 2017-12-26 ENCOUNTER — TELEPHONE (OUTPATIENT)
Dept: ORTHOPEDICS | Facility: CLINIC | Age: 47
End: 2017-12-26

## 2017-12-26 NOTE — TELEPHONE ENCOUNTER
Call from patient he is ready to schedule surgery. He is looking to get this done after January 15th, he is getting  on the 14th. I told patient I would put this message to the care team for scheduling.       Thank you,   Cheryle العلي   [facility] Department  [phone]'

## 2018-01-29 ENCOUNTER — THERAPY VISIT (OUTPATIENT)
Dept: PHYSICAL THERAPY | Facility: CLINIC | Age: 48
End: 2018-01-29
Payer: OTHER MISCELLANEOUS

## 2018-01-29 DIAGNOSIS — M25.511 SHOULDER PAIN, RIGHT: Primary | ICD-10-CM

## 2018-01-29 DIAGNOSIS — M77.01 MEDIAL EPICONDYLITIS OF ELBOW, RIGHT: ICD-10-CM

## 2018-01-29 DIAGNOSIS — M77.11 LATERAL EPICONDYLITIS, RIGHT ELBOW: ICD-10-CM

## 2018-01-29 PROCEDURE — 97161 PT EVAL LOW COMPLEX 20 MIN: CPT | Mod: GP | Performed by: PHYSICAL THERAPIST

## 2018-01-29 PROCEDURE — 97530 THERAPEUTIC ACTIVITIES: CPT | Mod: GP | Performed by: PHYSICAL THERAPIST

## 2018-01-29 PROCEDURE — 97110 THERAPEUTIC EXERCISES: CPT | Mod: GP | Performed by: PHYSICAL THERAPIST

## 2018-01-29 NOTE — MR AVS SNAPSHOT
After Visit Summary   1/29/2018    Grabiel Cotto    MRN: [id number]           Patient Information     Date Of Birth          1970        Visit Information        Provider Department      1/29/2018 10:05 AM Mir Covarrubias, PT [facility]        Today's Diagnoses     Shoulder pain, right    -  1    Medial epicondylitis of elbow, right        Lateral epicondylitis, right elbow           Follow-ups after your visit        Your next 10 appointments already scheduled     Feb 01, 2018 10:10 AM CST   [facility] Extremity with Sergio Root, PT   [facility] ([facility]    [address] [place] [address]   [phone]              Who to contact     If you have questions or need follow up information about today's clinic visit or your schedule please contact [facility] directly at [phone].  Normal or non-critical lab and imaging results will be communicated to you by thredUPhart, letter or phone within 4 business days after the clinic has received the results. If you do not hear from us within 7 days, please contact the clinic through thredUPhart or phone. If you have a critical or abnormal lab result, we will notify you by phone as soon as possible.  Submit refill requests through [facility] or call your pharmacy and they will forward the refill request to us. Please allow 3 business days for your refill to be completed.          Additional Information About Your Visit        MyChart Information     [employer] [facility] gives you secure access to your electronic health record. If you see a primary care provider, you can also send messages to your care team and make appointments. If you have questions, please call your primary care clinic.  If you do not have a primary care provider, please call [phone] and they will assist you.        Care EveryWhere ID     This is your Care EveryWhere ID. This could be used by other organizations to access your [facility]  medical records  [id number]         Blood Pressure from Last 3 Encounters:   11/10/17 139/89   10/23/17 135/78    Weight from Last 3 Encounters:   11/16/17 127 kg (280 lb)   11/10/17 129.3 kg (285 lb)   10/23/17 127.9 kg (282 lb)              We Performed the Following     HC PT EVAL, LOW COMPLEXITY     [facility] INITIAL EVAL REPORT     THERAPEUTIC ACTIVITIES     THERAPEUTIC EXERCISES        Primary Care Provider Office Phone # Fax #    Bertha Yip [phone] [address] [facility] [address]        Equal Access to Services     [facility]: Hadii ayad barbosa hadasho Sojere, waaxda luqadaha, qaybta kaalmada adejpyada, woody medellin . So [facility] [phone].    ATENCIÓN: Si habla español, tiene a herndon disposición servicios gratuitos de asistencia lingüística. Llame al [phone].    We comply with applicable federal civil rights laws and [place] laws. We do not discriminate on the basis of race, color, national origin, age, disability, sex, sexual orientation, or gender identity.            Thank you!     Thank you for choosing [facility]  for your care. Our goal is always to provide you with excellent care. Hearing back from our patients is one way we can continue to improve our services. Please take a few minutes to complete the written survey that you may receive in the mail after your visit with us. Thank you!             Your Updated Medication List - Protect others around you: Learn how to safely use, store and throw away your medicines at www.disposemymeds.org.          This list is accurate as of 1/29/18  2:33 PM.  Always use your most recent med list.                   Brand Name Dispense Instructions for use Diagnosis    gabapentin 300 MG capsule    NEURONTIN    60 capsule    Take 1 capsule (300mg) at bedtime.    Right elbow pain, Acute pain of right shoulder       methylPREDNISolone 4 MG tablet    MEDROL DOSEPAK    21 tablet     Follow package instructions    Acute pain of right shoulder, Right elbow pain

## 2018-01-29 NOTE — LETTER
[facility]  [address]  [place] [address]  [phone]    2018    Re: Grabiel Cotto   :   1970  MRN:  [id number]   REFERRING PHYSICIAN:   [facility]    Date of Initial Evaluation:  18  Visits:  Rxs Used: 1  Reason for Referral:     Shoulder pain, right  Medial epicondylitis of elbow, right  Lateral epicondylitis, right elbow    [facility] Initial Evaluation    Subjective:  Patient is a 47 year old male presenting with rehab right shoulder hpi and rehab right elbow hpi. The history is provided by the patient.   Grabiel Cotto is a 47 year old male with a right shoulder condition.  Condition occurred with:  A fall.  Condition occurred: at work.  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 He said his shoulder dislocated in which he tore all three of the posterior RC and partially tore is subscapularis. Also tore his biceps tendon. Patient is right handed.  Patient reports pain:  Anterior, posterior and lateral.  Radiates to:  Cervical.  Pain is described as sharp and aching and is constant and reported as 8/10.  Associated symptoms:  Loss of motion/stiffness, dislocating/subluxing, painful arc and loss of strength. Pain is the same all the time.  Symptoms are exacerbated by using arm overhead, using arm behind back, using arm at shoulder level, lifting, carrying and lying on extremity and relieved by rest and NSAID's.  Since onset symptoms are unchanged.  Special tests:  MRI.      General health as reported by patient is fair.  Pertinent medical history includes:  Smoking and overweight (Quit smoking approximately 2 years ago.).  Medical allergies: no.  Other surgeries include:  None reported.  Current medications:  Anti-inflammatory.  Current occupation is .  .  Patient is working in normal job with restrictions (Currently laid off).  Primary  job tasks include:  Driving, lifting and prolonged sitting.    Barriers include:  None as reported by the patient.    Red flags:  None as reported by the patient.    Grabiel Cotto is a 47 year old male with a right elbow condition.  Condition occurred with:  A fall.  Condition occurred: at work.  This is a new condition     Patient reports pain:  Medial epicondyle and lateral epicondyle.    Pain is described as aching and sharp and is intermittent and reported as 8/10.  Associated symptoms:  Loss of strength. Pain is the same all the time.  Symptoms are exacerbated by lifting and carrying and relieved by rest and ice.  Since onset symptoms are unchanged.  Special tests:  MRI.                        Objective:  Standing Alignment:    Shoulder/UE:  Rounded shoulders and elevated scapula R    Shoulder Evaluation:  ROM:  AROM:    Flexion:  Left:  162    Right:  98  Abduction:  Left: 162   Right:  65  External Rotation:  Left:  66    Right:  35  Extension/Internal Rotation:  Left:  T9    Right:  PSIS      Strength:    Flexion: Left:5/5   Pain:    Right: 4-/5     Pain:   Extension:  Left: 5/5    Pain:    Right: 4+/5    Pain:  Abduction:  Left: 5/5  Pain:    Right: 4/5     Pain:  Adduction:  Left: 5/5    Pain:    Right: 4+/5     Pain:  Internal Rotation:  Left:5/5     Pain:    Right: 4+/5     Pain:  External Rotation:   Left:5/5     Pain:   Right:3/5     Pain:      Special Tests:    Right shoulder positive for the following special tests:Rotator cuff tear    Palpation:    Right shoulder tenderness present at: Acrimioclavicular; Biceps; Supraspinatus; Levator and Upper Trap       ROM:  AROM:  normal    Strength:    Flexion Wrist:  Left: 5/5 Pain:    Right: 3/5  Pain:+  Extension Wrist: Left: 5/5 Pain:  Right: 3/5  Pain:+    Special Testing:    Right wrist/elbow positive for the following special tests: Lateral Epicondylitis and Medial Epicondylitis    Palpation:    Right wrist/elbow tenderness present at:Lateral  Epicondyle; Medial Epicondyle; Wrist Flexors and Wrist Extensors    Assessment/Plan:    Patient is a 47 year old male with right side shoulder complaints.    Patient has the following significant findings with corresponding treatment plan.                Diagnosis 1:  R shoulder pain, Massive R RC tear, medial and lateral epicondylitis on the R  Pain -  hot/cold therapy, US, electric stimulation, manual therapy, splint/taping/bracing/orthotics, self management, education and home program  Decreased ROM/flexibility - manual therapy, therapeutic exercise, therapeutic activity and home program  Decreased strength - therapeutic exercise, therapeutic activities and home program  Decreased proprioception - neuro re-education, therapeutic activities and home program  Inflammation - cold therapy, vasoneumatic device, US and self management/home program  Impaired muscle performance - electric stimulation, neuro re-education and home program  Decreased function - therapeutic activities and home program  Impaired posture - neuro re-education, therapeutic activities and home program  Instability -  Therapeutic Activity  Therapeutic Exercise  Neuromuscular Re-education  Splinting/Taping/Bracing/Orthotic  home program    Therapy Evaluation Codes:   1) History comprised of:   Personal factors that impact the plan of care:      Time since onset of symptoms.    Comorbidity factors that impact the plan of care are:      Overweight and Smoking.     Medications impacting care: Anti-inflammatory.  2) Examination of Body Systems comprised of:   Body structures and functions that impact the plan of care:      Shoulder.   Activity limitations that impact the plan of care are:      Bathing, Cooking, Driving, Grasping, Lifting, Throwing, Working and Sleeping.  3) Clinical presentation characteristics are:   Stable/Uncomplicated.  4) Decision-Making    Low complexity using standardized patient assessment instrument and/or measureable  assessment of functional outcome.    Cumulative Therapy Evaluation is: Low complexity.  Previous and current functional limitations:  (See Goal Flow Sheet for this information)    Short term and Long term goals: (See Goal Flow Sheet for this information)     Communication ability:  Patient appears to be able to clearly communicate and understand verbal and written communication and follow directions correctly.  Treatment Explanation - The following has been discussed with the patient:   RX ordered/plan of care  Anticipated outcomes  Possible risks and side effects  This patient would benefit from PT intervention to resume normal activities.   Rehab potential is good.    Frequency:  2 X week, once daily  Duration:  for 2 weeks  Discharge Plan:  Achieve all LTG.  Independent in home treatment program.  Reach maximal therapeutic benefit.    Thank you for your referral.    INQUIRIES  Therapist: Mir Covarrubias , RAMILAT, CSCS  [facility]  [address] [place] [address]  Phone: [phone]  Fax: [phone]

## 2018-01-29 NOTE — PROGRESS NOTES
[facility] Initial Evaluation  Subjective:  Patient is a 47 year old male presenting with rehab right shoulder hpi and rehab right elbow hpi. The history is provided by the patient.   Grabiel Cotto is a 47 year old male with a right shoulder condition.  Condition occurred with:  A fall.  Condition occurred: at work.  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 He said his shoulder dislocated in which he tore all three of the posterior RC and partially tore is subscapularis. Also tore his biceps tendon. Patient is right handed.  .    Patient reports pain:  Anterior, posterior and lateral.  Radiates to:  Cervical.  Pain is described as sharp and aching and is constant and reported as 8/10.  Associated symptoms:  Loss of motion/stiffness, dislocating/subluxing, painful arc and loss of strength. Pain is the same all the time.  Symptoms are exacerbated by using arm overhead, using arm behind back, using arm at shoulder level, lifting, carrying and lying on extremity and relieved by rest and NSAID's.  Since onset symptoms are unchanged.  Special tests:  MRI.      General health as reported by patient is fair.  Pertinent medical history includes:  Smoking and overweight (Quit smoking approximately 2 years ago.).  Medical allergies: no.  Other surgeries include:  None reported.  Current medications:  Anti-inflammatory.  Current occupation is .  .  Patient is working in normal job with restrictions (Currently laid off).  Primary job tasks include:  Driving, lifting and prolonged sitting.    Barriers include:  None as reported by the patient.    Red flags:  None as reported by the patient.      Grabiel Cotto is a 47 year old male with a right elbow condition.  Condition occurred with:  A fall.  Condition occurred: at work.  This is a new condition     Patient reports pain:  Medial epicondyle and lateral epicondyle.    Pain is described as aching  and sharp and is intermittent and reported as 8/10.  Associated symptoms:  Loss of strength. Pain is the same all the time.  Symptoms are exacerbated by lifting and carrying and relieved by rest and ice.  Since onset symptoms are unchanged.  Special tests:  MRI.                                                    Objective:  Standing Alignment:      Shoulder/UE:  Rounded shoulders and elevated scapula R                                       Shoulder Evaluation:  ROM:  AROM:    Flexion:  Left:  162    Right:  98    Abduction:  Left: 162   Right:  65      External Rotation:  Left:  66    Right:  35            Extension/Internal Rotation:  Left:  T9    Right:  PSIS          Strength:    Flexion: Left:5/5   Pain:    Right: 4-/5     Pain:   Extension:  Left: 5/5    Pain:    Right: 4+/5    Pain:  Abduction:  Left: 5/5  Pain:    Right: 4/5     Pain:  Adduction:  Left: 5/5    Pain:    Right: 4+/5     Pain:  Internal Rotation:  Left:5/5     Pain:    Right: 4+/5     Pain:  External Rotation:   Left:5/5     Pain:   Right:3/5     Pain:              Special Tests:      Right shoulder positive for the following special tests:Rotator cuff tear  Palpation:      Right shoulder tenderness present at: Acrimioclavicular; Biceps; Supraspinatus; Levator and Upper Trap       ROM:  AROM:  normal                          Strength:        Flexion Wrist:  Left: 5/5 Pain:    Right: 3/5  Pain:+  Extension Wrist: Left: 5/5 Pain:  Right: 3/5  Pain:+              Special Testing:      Right wrist/elbow positive for the following special tests: Lateral Epicondylitis and Medial Epicondylitis  Palpation:      Right wrist/elbow tenderness present at:Lateral Epicondyle; Medial Epicondyle; Wrist Flexors and Wrist Extensors                                General     ROS    Assessment/Plan:    Patient is a 47 year old male with right side shoulder complaints.    Patient has the following significant findings with corresponding treatment plan.                 Diagnosis 1:  R shoulder pain, Massive R RC tear, medial and lateral epicondylitis on the R  Pain -  hot/cold therapy, US, electric stimulation, manual therapy, splint/taping/bracing/orthotics, self management, education and home program  Decreased ROM/flexibility - manual therapy, therapeutic exercise, therapeutic activity and home program  Decreased strength - therapeutic exercise, therapeutic activities and home program  Decreased proprioception - neuro re-education, therapeutic activities and home program  Inflammation - cold therapy, vasoneumatic device, US and self management/home program  Impaired muscle performance - electric stimulation, neuro re-education and home program  Decreased function - therapeutic activities and home program  Impaired posture - neuro re-education, therapeutic activities and home program  Instability -  Therapeutic Activity  Therapeutic Exercise  Neuromuscular Re-education  Splinting/Taping/Bracing/Orthotic  home program    Therapy Evaluation Codes:   1) History comprised of:   Personal factors that impact the plan of care:      Time since onset of symptoms.    Comorbidity factors that impact the plan of care are:      Overweight and Smoking.     Medications impacting care: Anti-inflammatory.  2) Examination of Body Systems comprised of:   Body structures and functions that impact the plan of care:      Shoulder.   Activity limitations that impact the plan of care are:      Bathing, Cooking, Driving, Grasping, Lifting, Throwing, Working and Sleeping.  3) Clinical presentation characteristics are:   Stable/Uncomplicated.  4) Decision-Making    Low complexity using standardized patient assessment instrument and/or measureable assessment of functional outcome.  Cumulative Therapy Evaluation is: Low complexity.    Previous and current functional limitations:  (See Goal Flow Sheet for this information)    Short term and Long term goals: (See Goal Flow Sheet for this information)      Communication ability:  Patient appears to be able to clearly communicate and understand verbal and written communication and follow directions correctly.  Treatment Explanation - The following has been discussed with the patient:   RX ordered/plan of care  Anticipated outcomes  Possible risks and side effects  This patient would benefit from PT intervention to resume normal activities.   Rehab potential is good.    Frequency:  2 X week, once daily  Duration:  for 2 weeks  Discharge Plan:  Achieve all LTG.  Independent in home treatment program.  Reach maximal therapeutic benefit.    Please refer to the daily flowsheet for treatment today, total treatment time and time spent performing 1:1 timed codes.

## 2018-02-01 ENCOUNTER — THERAPY VISIT (OUTPATIENT)
Dept: PHYSICAL THERAPY | Facility: CLINIC | Age: 48
End: 2018-02-01
Payer: OTHER MISCELLANEOUS

## 2018-02-01 DIAGNOSIS — M77.11 LATERAL EPICONDYLITIS, RIGHT ELBOW: ICD-10-CM

## 2018-02-01 DIAGNOSIS — M77.01 MEDIAL EPICONDYLITIS OF ELBOW, RIGHT: ICD-10-CM

## 2018-02-01 DIAGNOSIS — M25.511 ACUTE PAIN OF RIGHT SHOULDER: ICD-10-CM

## 2018-02-01 PROCEDURE — 97112 NEUROMUSCULAR REEDUCATION: CPT | Mod: GP | Performed by: PHYSICAL THERAPIST

## 2018-02-01 PROCEDURE — 97110 THERAPEUTIC EXERCISES: CPT | Mod: GP | Performed by: PHYSICAL THERAPIST

## 2018-02-07 ENCOUNTER — THERAPY VISIT (OUTPATIENT)
Dept: PHYSICAL THERAPY | Facility: CLINIC | Age: 48
End: 2018-02-07
Payer: OTHER MISCELLANEOUS

## 2018-02-07 DIAGNOSIS — M77.01 MEDIAL EPICONDYLITIS OF ELBOW, RIGHT: ICD-10-CM

## 2018-02-07 DIAGNOSIS — M25.511 ACUTE PAIN OF RIGHT SHOULDER: ICD-10-CM

## 2018-02-07 DIAGNOSIS — M77.11 LATERAL EPICONDYLITIS, RIGHT ELBOW: ICD-10-CM

## 2018-02-07 PROCEDURE — 97140 MANUAL THERAPY 1/> REGIONS: CPT | Mod: GP | Performed by: PHYSICAL THERAPIST

## 2018-02-07 PROCEDURE — 97110 THERAPEUTIC EXERCISES: CPT | Mod: GP | Performed by: PHYSICAL THERAPIST

## 2018-02-08 ENCOUNTER — OFFICE VISIT (OUTPATIENT)
Dept: FAMILY MEDICINE | Facility: CLINIC | Age: 48
End: 2018-02-08
Payer: OTHER MISCELLANEOUS

## 2018-02-08 VITALS
HEART RATE: 87 BPM | HEIGHT: 73 IN | TEMPERATURE: 97.8 F | SYSTOLIC BLOOD PRESSURE: 138 MMHG | OXYGEN SATURATION: 97 % | DIASTOLIC BLOOD PRESSURE: 85 MMHG | BODY MASS INDEX: 37.11 KG/M2 | WEIGHT: 280 LBS

## 2018-02-08 DIAGNOSIS — M25.511 ACUTE PAIN OF RIGHT SHOULDER: ICD-10-CM

## 2018-02-08 DIAGNOSIS — Z01.818 PREOP GENERAL PHYSICAL EXAM: Primary | ICD-10-CM

## 2018-02-08 LAB — HGB BLD-MCNC: 15.1 G/DL (ref 13.3–17.7)

## 2018-02-08 PROCEDURE — 99214 OFFICE O/P EST MOD 30 MIN: CPT | Performed by: PHYSICIAN ASSISTANT

## 2018-02-08 PROCEDURE — 85018 HEMOGLOBIN: CPT | Performed by: PHYSICIAN ASSISTANT

## 2018-02-08 PROCEDURE — 36415 COLL VENOUS BLD VENIPUNCTURE: CPT | Performed by: PHYSICIAN ASSISTANT

## 2018-02-08 NOTE — NURSING NOTE
"Chief Complaint   Patient presents with     Pre-Op Exam       Initial /85  Pulse 87  Temp 97.8  F (36.6  C) (Oral)  Ht 6' 1.25\" (1.861 m)  Wt 280 lb (127 kg)  SpO2 97%  BMI 36.69 kg/m2 Estimated body mass index is 36.69 kg/(m^2) as calculated from the following:    Height as of this encounter: 6' 1.25\" (1.861 m).    Weight as of this encounter: 280 lb (127 kg).  Medication Reconciliation: complete  Julianna Feliz CMA    "

## 2018-02-08 NOTE — PROGRESS NOTES
[facility]  [id number] [facility] [id number] [phone]  Dept: [phone]    PRE-OP EVALUATION:  Today's date: 2018    Grabiel Cotto (: 1970) presents for pre-operative evaluation assessment as requested by Dr. Dallas.  He requires evaluation and anesthesia risk assessment prior to undergoing surgery/procedure for treatment of right shoulder pain .  Proposed procedure: right shoulder arthroscopic RCR, AC joint resection, SAD, bicep.     Date of Surgery/ Procedure: 18  Time of Surgery/ Procedure: unknown  Hospital/Surgical Facility: [facility]  Fax number for surgical facility: [phone]  Primary Physician: Bertha Yip  Type of Anesthesia Anticipated: General    Patient has a Health Care Directive or Living Will:  NO    Preop Questions 2018   1.  Do you have a history of heart attack, stroke, stent, bypass or surgery on an artery in the head, neck, heart or legs? No   2.  Do you ever have any pain or discomfort in your chest? No   3.  Do you have a history of  Heart Failure? No   4.   Are you troubled by shortness of breath when:  walking on a level surface, or up a slight hill, or at night? No   5.  Do you currently have a cold, bronchitis or other respiratory infection? No   6.  Do you have a cough, shortness of breath, or wheezing? No   7.  Do you sometimes get pains in the calves of your legs when you walk? No   8. Do you or anyone in your family have previous history of blood clots? No   9.  Do you or does anyone in your family have a serious bleeding problem such as prolonged bleeding following surgeries or cuts? No   10. Have you ever had problems with anemia or been told to take iron pills? No   11. Have you had any abnormal blood loss such as black, tarry or bloody stools? No   12. Have you ever had a blood transfusion? No   13. Have you or any of your relatives ever had problems with anesthesia? No   14. Do you have  sleep apnea, excessive snoring or daytime drowsiness? No   15. Do you have any prosthetic heart valves? No   16. Do you have prosthetic joints? No         HPI:                                                      Brief HPI related to upcoming procedure: fell out of truck on right shoulder at work in august      See problem list for active medical problems.  Problems all longstanding and stable, except as noted/documented.  See ROS for pertinent symptoms related to these conditions.                                                                                                  .    MEDICAL HISTORY:                                                      Patient Active Problem List    Diagnosis Date Noted     Lateral epicondylitis, right elbow 01/29/2018     Priority: Medium     Shoulder pain, right 01/29/2018     Priority: Medium     Impingement syndrome of right shoulder 11/16/2017     Priority: Medium     AC (acromioclavicular) joint arthritis 11/16/2017     Priority: Medium     Complete tear of right rotator cuff 11/16/2017     Priority: Medium     Medial epicondylitis of elbow, right 11/16/2017     Priority: Medium     Supraspinatus tendon tear, right, initial encounter 11/14/2017     Priority: Medium     Right shoulder pain, unspecified chronicity 10/24/2017     Priority: Medium      History reviewed. No pertinent past medical history.  History reviewed. No pertinent surgical history.  No current outpatient prescriptions on file.     OTC products: None, except as noted above    No Known Allergies   Latex Allergy: NO    Social History   Substance Use Topics     Smoking status: Former Smoker     Quit date: 10/23/2015     Smokeless tobacco: Never Used     Alcohol use Yes     History   Drug Use No       REVIEW OF SYSTEMS:                                                    C: NEGATIVE for fever, chills, change in weight  I: NEGATIVE for worrisome rashes, moles or lesions  E: NEGATIVE for vision changes or  "irritation  E/M: NEGATIVE for ear, mouth and throat problems  R: NEGATIVE for significant cough or SOB  B: NEGATIVE for masses, tenderness or discharge  CV: NEGATIVE for chest pain, palpitations or peripheral edema  GI: NEGATIVE for nausea, abdominal pain, heartburn, or change in bowel habits  : NEGATIVE for frequency, dysuria, or hematuria  M: NEGATIVE for significant arthralgias or myalgia  N: NEGATIVE for weakness, dizziness or paresthesias  E: NEGATIVE for temperature intolerance, skin/hair changes  H: NEGATIVE for bleeding problems  P: NEGATIVE for changes in mood or affect    EXAM:                                                    /85  Pulse 87  Temp 97.8  F (36.6  C) (Oral)  Ht 6' 1.25\" (1.861 m)  Wt 280 lb (127 kg)  SpO2 97%  BMI 36.69 kg/m2    GENERAL APPEARANCE: healthy, alert and no distress     EYES: EOMI,  PERRL     HENT: ear canals and TM's normal and nose and mouth without ulcers or lesions     NECK: no adenopathy, no asymmetry, masses, or scars and thyroid normal to palpation     RESP: lungs clear to auscultation - no rales, rhonchi or wheezes     CV: regular rates and rhythm, normal S1 S2, no S3 or S4 and no murmur, click or rub     ABDOMEN:  soft, nontender, no HSM or masses and bowel sounds normal     MS: extremities normal- no gross deformities noted, no evidence of inflammation in joints, FROM in all extremities.     SKIN: no suspicious lesions or rashes     NEURO: Normal strength and tone, sensory exam grossly normal, mentation intact and speech normal     PSYCH: mentation appears normal. and affect normal/bright     LYMPHATICS: No axillary, cervical, or supraclavicular nodes    DIAGNOSTICS:                                                      Labs Drawn and in Process:   Unresulted Labs Ordered in the Past 30 Days of this Admission     No orders found from 12/10/2017 to 2/9/2018.          No results for input(s): HGB, PLT, INR, NA, POTASSIUM, CR, A1C in the last 63682 hours. "     IMPRESSION:                                                    Reason for surgery/procedure: surgery/procedure for treatment of right shoulder pain .  Proposed procedure: right shoulder arthroscopic RCR, AC joint resection, SAD, bicep.     The proposed surgical procedure is considered INTERMEDIATE risk.    REVISED CARDIAC RISK INDEX  The patient has the following serious cardiovascular risks for perioperative complications such as (MI, PE, VFib and 3  AV Block):  No serious cardiac risks  INTERPRETATION: 0 risks: Class I (very low risk - 0.4% complication rate)    The patient has the following additional risks for perioperative complications:  No identified additional risks      ICD-10-CM    1. Preop general physical exam Z01.818 Hemoglobin   2. Acute pain of right shoulder M25.511        RECOMMENDATIONS:                                                        APPROVAL GIVEN to proceed with proposed procedure, without further diagnostic evaluation       Signed Electronically by: Thor Dawson PA-C    Copy of this evaluation report is provided to requesting physician.    [facility] Preop Guidelines  I agree with the above plan  Durga Brock MD

## 2018-02-08 NOTE — MR AVS SNAPSHOT
After Visit Summary   2/8/2018    Grabiel Cotto    MRN: [id number]           Patient Information     Date Of Birth          1970        Visit Information        Provider Department      2/8/2018 11:20 AM Thor Dawson PA-C [facility]        Today's Diagnoses     Preop general physical exam    -  1    Acute pain of right shoulder          Care Instructions      Before Your Surgery      Call your surgeon if there is any change in your health. This includes signs of a cold or flu (such as a sore throat, runny nose, cough, rash or fever).    Do not smoke, drink alcohol or take over the counter medicine (unless your surgeon or primary care doctor tells you to) for the 24 hours before and after surgery.    If you take prescribed drugs: Follow your doctor s orders about which medicines to take and which to stop until after surgery.    Eating and drinking prior to surgery: follow the instructions from your surgeon    Take a shower or bath the night before surgery. Use the soap your surgeon gave you to gently clean your skin. If you do not have soap from your surgeon, use your regular soap. Do not shave or scrub the surgery site.  Wear clean pajamas and have clean sheets on your bed.           Follow-ups after your visit        Who to contact     If you have questions or need follow up information about today's clinic visit or your schedule please contact [facility] directly at [phone].  Normal or non-critical lab and imaging results will be communicated to you by MyChart, letter or phone within 4 business days after the clinic has received the results. If you do not hear from us within 7 days, please contact the clinic through MyChart or phone. If you have a critical or abnormal lab result, we will notify you by phone as soon as possible.  Submit refill requests through [employer] or call your pharmacy and they will forward the refill request to us. Please allow 3  "business days for your refill to be completed.          Additional Information About Your Visit        MyChart Information     [facility] [employer] gives you secure access to your electronic health record. If you see a primary care provider, you can also send messages to your care team and make appointments. If you have questions, please call your primary care clinic.  If you do not have a primary care provider, please call [phone] and they will assist you.        Care EveryWhere ID     This is your Care EveryWhere ID. This could be used by other organizations to access your [facility] medical records  [id number]        Your Vitals Were     Pulse Temperature Height Pulse Oximetry BMI (Body Mass Index)       87 97.8  F (36.6  C) (Oral) 6' 1.25\" (1.861 m) 97% 36.69 kg/m2        Blood Pressure from Last 3 Encounters:   02/08/18 138/85   11/10/17 139/89   10/23/17 135/78    Weight from Last 3 Encounters:   02/08/18 280 lb (127 kg)   11/16/17 280 lb (127 kg)   11/10/17 285 lb (129.3 kg)              We Performed the Following     Hemoglobin        Primary Care Provider Office Phone # Fax #    Bertha Yip [phone] [address] [facility] [address]        Equal Access to Services     AMRIK ZIEGLER : Hadii ayad ku hadasho Soomaali, waaxda luqadaha, qaybta kaalmada adeegyada, woody magaña haymaribell medellin . So [facility] [place] [phone].    ATENCIÓN: Si habla español, tiene a herndon disposición servicios gratuitos de asistencia lingüística. Llame al [phone].    We comply with applicable federal civil rights laws and [place] laws. We do not discriminate on the basis of race, color, national origin, age, disability, sex, sexual orientation, or gender identity.            Thank you!     Thank you for choosing [facility]  for your care. Our goal is always to provide you with excellent care. Hearing back from our patients is one way we can continue to improve our services. " Please take a few minutes to complete the written survey that you may receive in the mail after your visit with us. Thank you!             Your Updated Medication List - Protect others around you: Learn how to safely use, store and throw away your medicines at www.disposemymeds.org.      Notice  As of 2/8/2018 11:49 AM    You have not been prescribed any medications.

## 2018-03-21 ENCOUNTER — THERAPY VISIT (OUTPATIENT)
Dept: PHYSICAL THERAPY | Facility: CLINIC | Age: 48
End: 2018-03-21
Payer: OTHER MISCELLANEOUS

## 2018-03-21 DIAGNOSIS — Z98.890 S/P RIGHT ROTATOR CUFF REPAIR: Primary | ICD-10-CM

## 2018-03-21 PROBLEM — M77.11 LATERAL EPICONDYLITIS, RIGHT ELBOW: Status: RESOLVED | Noted: 2018-01-29 | Resolved: 2018-03-21

## 2018-03-21 PROBLEM — M77.01 MEDIAL EPICONDYLITIS OF ELBOW, RIGHT: Status: RESOLVED | Noted: 2017-11-16 | Resolved: 2018-03-21

## 2018-03-21 PROBLEM — M25.511 SHOULDER PAIN, RIGHT: Status: RESOLVED | Noted: 2018-01-29 | Resolved: 2018-03-21

## 2018-03-21 PROCEDURE — 97161 PT EVAL LOW COMPLEX 20 MIN: CPT | Mod: GP | Performed by: PHYSICAL THERAPIST

## 2018-03-21 PROCEDURE — 97110 THERAPEUTIC EXERCISES: CPT | Mod: GP | Performed by: PHYSICAL THERAPIST

## 2018-03-21 NOTE — PROGRESS NOTES
[facility] Initial Evaluation  Subjective:  Patient is a 48 year old male presenting with rehab right shoulder hpi. The history is provided by the patient.   Grabiel Cotto is a 48 year old male with a right shoulder condition.  Condition occurred with:  A fall.  Condition occurred: at work.  This is a new condition  R RC repair including subscapularis on 2/19/18. R hand dominant.  .    Patient reports pain:  In the joint, anterior, posterior and lateral.    Pain is described as aching and sharp  and reported as 8/10.  Associated symptoms:  Loss of strength and loss of motion/stiffness. Pain is worse during the night.  Symptoms are exacerbated by using arm overhead, using arm at shoulder level, carrying, using arm behind back and lying on extremity and relieved by rest, analgesics and ice.  Since onset symptoms are gradually improving.    Previous treatment includes physical therapy.  There was mild improvement following previous treatment.  General health as reported by patient is good.  Pertinent medical history includes:  None.  Medical allergies: no.  Other surgeries include:  Orthopedic surgery.  Current medications:  Pain medication.  Current occupation is   .  Patient is currently not working due to present treatment problem.  Primary job tasks include:  Driving, lifting and prolonged sitting.    Barriers include:  None as reported by the patient.    Red flags:  Pain at rest/night.                        Objective:  Standing Alignment:    Cervical/Thoracic:  Forward head  Shoulder/UE:  Rounded shoulders and elevated scapula R                                       Shoulder Evaluation:  ROM:  AROM:    Flexion:  Left:  153        Abduction:  Left: 152         External Rotation:  Left:  64          Elbow Flexion:  Left:  143    Right:  105  Elbow Extension:  Left:  0   Right:  -11    Extension/Internal Rotation:  Left:  T8        PROM:    Flexion:  Right: 23      Abduction:   Right:  44      External Rotation:  Right:  -13                                                               General     ROS    Assessment/Plan:    Patient is a 48 year old male with right side shoulder complaints.    Patient has the following significant findings with corresponding treatment plan.                Diagnosis 1:  S/p R rotator cuff repair  Pain -  hot/cold therapy, US, electric stimulation, manual therapy, splint/taping/bracing/orthotics, self management, education and home program  Decreased ROM/flexibility - manual therapy, therapeutic exercise, therapeutic activity and home program  Decreased joint mobility - manual therapy, therapeutic exercise, therapeutic activity and home program  Decreased strength - therapeutic exercise, therapeutic activities and home program  Decreased proprioception - neuro re-education, therapeutic activities and home program  Inflammation - cold therapy, US, electric stimulation and self management/home program  Impaired muscle performance - electric stimulation, neuro re-education and home program  Decreased function - therapeutic activities, home program and functional performance testing  Impaired posture - neuro re-education, therapeutic activities and home program    Therapy Evaluation Codes:   1) History comprised of:   Personal factors that impact the plan of care:      Age and Overall behavior pattern.    Comorbidity factors that impact the plan of care are:      None.     Medications impacting care: Pain.  2) Examination of Body Systems comprised of:   Body structures and functions that impact the plan of care:      Elbow and Shoulder.   Activity limitations that impact the plan of care are:      Bathing, Cooking, Driving, Dressing, Grasping, Lifting, Working and Sleeping.  3) Clinical presentation characteristics are:   Stable/Uncomplicated.  4) Decision-Making    Low complexity using standardized patient assessment instrument and/or measureable assessment of  functional outcome.  Cumulative Therapy Evaluation is: Low complexity.    Previous and current functional limitations:  (See Goal Flow Sheet for this information)    Short term and Long term goals: (See Goal Flow Sheet for this information)     Communication ability:  Patient appears to be able to clearly communicate and understand verbal and written communication and follow directions correctly.  Treatment Explanation - The following has been discussed with the patient:   RX ordered/plan of care  Anticipated outcomes  Possible risks and side effects  This patient would benefit from PT intervention to resume normal activities.   Rehab potential is excellent.    Frequency:  1 X week, once daily  Duration:  for 10 weeks  Discharge Plan:  Achieve all LTG.  Independent in home treatment program.  Reach maximal therapeutic benefit.    Please refer to the daily flowsheet for treatment today, total treatment time and time spent performing 1:1 timed codes.

## 2018-03-21 NOTE — LETTER
[facility]  [address]  [place] [address]  [phone]    2018    Re: Grabiel Cotto   :   1970  MRN:  [id number]   REFERRING PHYSICIAN:   [facility]    Date of Initial Evaluation:  3/21/18  Visits:  Rxs Used: 1  Reason for Referral:  S/P right rotator cuff repair    EVALUATION SUMMARY    [facility] Initial Evaluation  Subjective:  Patient is a 48 year old male presenting with rehab right shoulder hpi. The history is provided by the patient.   Grabiel Cotto is a 48 year old male with a right shoulder condition.  Condition occurred with:  A fall.  Condition occurred: at work.  This is a new condition  R RC repair including subscapularis on 18. R hand dominant.      Patient reports pain:  In the joint, anterior, posterior and lateral.    Pain is described as aching and sharp  and reported as 8/10.  Associated symptoms:  Loss of strength and loss of motion/stiffness. Pain is worse during the night.  Symptoms are exacerbated by using arm overhead, using arm at shoulder level, carrying, using arm behind back and lying on extremity and relieved by rest, analgesics and ice.  Since onset symptoms are gradually improving.    Previous treatment includes physical therapy.  There was mild improvement following previous treatment.  General health as reported by patient is good.  Pertinent medical history includes:  None.  Medical allergies: no.  Other surgeries include:  Orthopedic surgery.  Current medications:  Pain medication.  Current occupation is   Patient is currently not working due to present treatment problem.  Primary job tasks include:  Driving, lifting and prolonged sitting.    Barriers include:  None as reported by the patient.  Red flags:  Pain at rest/night.    Objective:  Standing Alignment:    Cervical/Thoracic:  Forward head  Shoulder/UE:  Rounded shoulders and elevated scapula R        Shoulder Evaluation:  ROM:  AROM:    Flexion:  Left:  153      Abduction:  Left: 152     External Rotation:  Left:  64      Elbow Flexion:  Left:  143    Right:  105  Elbow Extension:  Left:  0   Right:  -11  Extension/Internal Rotation:  Left:  T8          PROM:    Flexion:  Right: 23    Abduction:  Right:  44  External Rotation:  Right:  -13    Assessment/Plan:    Patient is a 48 year old male with right side shoulder complaints.    Patient has the following significant findings with corresponding treatment plan.                Diagnosis 1:  S/p R rotator cuff repair  Pain -  hot/cold therapy, US, electric stimulation, manual therapy, splint/taping/bracing/orthotics, self management, education and home program  Decreased ROM/flexibility - manual therapy, therapeutic exercise, therapeutic activity and home program  Decreased joint mobility - manual therapy, therapeutic exercise, therapeutic activity and home program  Decreased strength - therapeutic exercise, therapeutic activities and home program  Decreased proprioception - neuro re-education, therapeutic activities and home program  Inflammation - cold therapy, US, electric stimulation and self management/home program  Impaired muscle performance - electric stimulation, neuro re-education and home program  Decreased function - therapeutic activities, home program and functional performance testing  Impaired posture - neuro re-education, therapeutic activities and home program    Therapy Evaluation Codes:   1) History comprised of:   Personal factors that impact the plan of care:      Age and Overall behavior pattern.    Comorbidity factors that impact the plan of care are:      None.     Medications impacting care: Pain.  2) Examination of Body Systems comprised of:   Body structures and functions that impact the plan of care:      Elbow and Shoulder.   Activity limitations that impact the plan of care are:      Bathing, Cooking, Driving, Dressing, Grasping,  Lifting, Working and Sleeping.  3) Clinical presentation characteristics are:   Stable/Uncomplicated.  4) Decision-Making    Low complexity using standardized patient assessment instrument and/or measureable assessment of functional outcome.  Cumulative Therapy Evaluation is: Low complexity.    Previous and current functional limitations:  (See Goal Flow Sheet for this information)    Short term and Long term goals: (See Goal Flow Sheet for this information)     Communication ability:  Patient appears to be able to clearly communicate and understand verbal and written communication and follow directions correctly.  Treatment Explanation - The following has been discussed with the patient:   RX ordered/plan of care  Anticipated outcomes  Possible risks and side effects  This patient would benefit from PT intervention to resume normal activities.   Rehab potential is excellent.    Frequency:  1 X week, once daily  Duration:  for 10 weeks  Discharge Plan:  Achieve all LTG.  Independent in home treatment program.  Reach maximal therapeutic benefit.    Thank you for your referral.    INQUIRIES  Therapist: Mir Covarrubias [facility]PT [facility]  [address] [place] [address]  Phone: [phone]  Fax: [phone]

## 2018-03-28 ENCOUNTER — THERAPY VISIT (OUTPATIENT)
Dept: PHYSICAL THERAPY | Facility: CLINIC | Age: 48
End: 2018-03-28
Payer: OTHER MISCELLANEOUS

## 2018-03-28 DIAGNOSIS — Z98.890 S/P RIGHT ROTATOR CUFF REPAIR: ICD-10-CM

## 2018-03-28 PROCEDURE — 97110 THERAPEUTIC EXERCISES: CPT | Mod: GP | Performed by: PHYSICAL THERAPIST

## 2018-04-04 ENCOUNTER — THERAPY VISIT (OUTPATIENT)
Dept: PHYSICAL THERAPY | Facility: CLINIC | Age: 48
End: 2018-04-04
Payer: OTHER MISCELLANEOUS

## 2018-04-04 DIAGNOSIS — Z98.890 S/P RIGHT ROTATOR CUFF REPAIR: ICD-10-CM

## 2018-04-04 PROCEDURE — 97110 THERAPEUTIC EXERCISES: CPT | Mod: GP | Performed by: PHYSICAL THERAPIST

## 2018-04-04 PROCEDURE — 97112 NEUROMUSCULAR REEDUCATION: CPT | Mod: GP | Performed by: PHYSICAL THERAPIST

## 2018-04-11 ENCOUNTER — THERAPY VISIT (OUTPATIENT)
Dept: PHYSICAL THERAPY | Facility: CLINIC | Age: 48
End: 2018-04-11
Payer: OTHER MISCELLANEOUS

## 2018-04-11 DIAGNOSIS — Z98.890 S/P RIGHT ROTATOR CUFF REPAIR: ICD-10-CM

## 2018-04-11 PROCEDURE — 97110 THERAPEUTIC EXERCISES: CPT | Mod: GP | Performed by: PHYSICAL THERAPIST

## 2018-04-18 ENCOUNTER — THERAPY VISIT (OUTPATIENT)
Dept: PHYSICAL THERAPY | Facility: CLINIC | Age: 48
End: 2018-04-18
Payer: OTHER MISCELLANEOUS

## 2018-04-18 DIAGNOSIS — Z98.890 S/P RIGHT ROTATOR CUFF REPAIR: ICD-10-CM

## 2018-04-18 PROCEDURE — 97112 NEUROMUSCULAR REEDUCATION: CPT | Mod: GP | Performed by: PHYSICAL THERAPIST

## 2018-04-18 PROCEDURE — 97110 THERAPEUTIC EXERCISES: CPT | Mod: GP | Performed by: PHYSICAL THERAPIST

## 2018-04-20 ENCOUNTER — TELEPHONE (OUTPATIENT)
Dept: OTHER | Facility: CLINIC | Age: 48
End: 2018-04-20

## 2018-04-20 NOTE — TELEPHONE ENCOUNTER
4/20/2018      [facility] Choices- patient on-boarded.             Outreach ,  Tyrone Taylor

## 2018-04-25 ENCOUNTER — THERAPY VISIT (OUTPATIENT)
Dept: PHYSICAL THERAPY | Facility: CLINIC | Age: 48
End: 2018-04-25
Payer: OTHER MISCELLANEOUS

## 2018-04-25 DIAGNOSIS — Z98.890 S/P RIGHT ROTATOR CUFF REPAIR: ICD-10-CM

## 2018-04-25 PROCEDURE — 97110 THERAPEUTIC EXERCISES: CPT | Mod: GP | Performed by: PHYSICAL THERAPIST

## 2018-04-25 NOTE — PROGRESS NOTES
Subjective:  HPI                    Objective:  System    Physical Exam    General     ROS    Assessment/Plan:    PROGRESS  REPORT    Progress reporting period is from 3/21/18 to 4/25/18.       SUBJECTIVE  Subjective: Nish reports that he is pretty pretty sore today from [facility] over the weekend. He has been stretching but is uncertain if it feels looser than last week.    Current Pain level: 6/10.     Initial Pain level: 8/10.   Changes in function:  Yes (See Goal flowsheet attached for changes in current functional level)  Adverse reaction to treatment or activity: None    OBJECTIVE  Changes noted in objective findings:  Yes  Objective: R shoulder AAROM: flex 128, ext 55, abd 100, base ER 49, IR/ext to L4.     ASSESSMENT/PLAN  Updated problem list and treatment plan: Diagnosis 1:  S/p R rotator cuff repair  Pain -  hot/cold therapy, US, electric stimulation, manual therapy, splint/taping/bracing/orthotics, self management, education and home program  Decreased ROM/flexibility - manual therapy, therapeutic exercise, therapeutic activity and home program  Decreased joint mobility - manual therapy, therapeutic exercise, therapeutic activity and home program  Decreased strength - therapeutic exercise, therapeutic activities and home program  Decreased proprioception - neuro re-education, therapeutic activities and home program  Impaired muscle performance - neuro re-education and home program  Decreased function - therapeutic activities and home program  STG/LTGs have been met or progress has been made towards goals:  Yes (See Goal flow sheet completed today.)  Assessment of Progress: The patient's condition is improving.  The patient's condition has potential to improve.  Self Management Plans:  Patient has been instructed in a home treatment program.  Patient  has been instructed in self management of symptoms.  I have re-evaluated this patient and find that the nature, scope, duration and intensity of the therapy  is appropriate for the medical condition of the patient.  Grabiel continues to require the following intervention to meet STG and LTG's:  PT    Recommendations:  This patient would benefit from continued therapy.     Frequency:  1 X week, once daily  Duration:  for 1 months tapering to 1 X a month over 3 months        Please refer to the daily flowsheet for treatment today, total treatment time and time spent performing 1:1 timed codes.

## 2018-04-25 NOTE — LETTER
[facility]  [address]  [place] [address]  [phone]    2018    Re: Grabiel Cotto   :   1970  MRN:  [id number]   REFERRING PHYSICIAN:   [facility]    Date of Initial Evaluation:  3/21/18  Visits:  Rxs Used: 6  Reason for Referral:  S/P right rotator cuff repair    PROGRESS  REPORT  Progress reporting period is from 3/21/18 to 18.       SUBJECTIVE  Subjective: Nish reports that he is pretty pretty sore today from [facility] over the weekend. He has been stretching but is uncertain if it feels looser than last week.    Current Pain level: 6/10.     Initial Pain level: 8/10.   Changes in function:  Yes (See Goal flowsheet attached for changes in current functional level)  Adverse reaction to treatment or activity: None    OBJECTIVE  Changes noted in objective findings:  Yes  Objective: R shoulder AAROM: flex 128, ext 55, abd 100, base ER 49, IR/ext to L4.     ASSESSMENT/PLAN  Updated problem list and treatment plan: Diagnosis 1:  S/p R rotator cuff repair  Pain -  hot/cold therapy, US, electric stimulation, manual therapy, splint/taping/bracing/orthotics, self management, education and home program  Decreased ROM/flexibility - manual therapy, therapeutic exercise, therapeutic activity and home program  Decreased joint mobility - manual therapy, therapeutic exercise, therapeutic activity and home program  Decreased strength - therapeutic exercise, therapeutic activities and home program  Decreased proprioception - neuro re-education, therapeutic activities and home program  Impaired muscle performance - neuro re-education and home program  Decreased function - therapeutic activities and home program  STG/LTGs have been met or progress has been made towards goals:  Yes (See Goal flow sheet completed today.)  Assessment of Progress: The patient's condition is improving.  The patient's condition has potential to improve.  Self  Management Plans:  Patient has been instructed in a home treatment program.  Patient  has been instructed in self management of symptoms.  I have re-evaluated this patient and find that the nature, scope, duration and intensity of the therapy is appropriate for the medical condition of the patient.  Grabiel continues to require the following intervention to meet STG and LTG's:  PT    Recommendations:  This patient would benefit from continued therapy.     Frequency:  1 X week, once daily  Duration:  for 1 months tapering to 1 X a month over 3 months    Thank you for your referral.    INQUIRIES  Therapist: Mir Covarrubias DPJOSE ARMANDO, CSCS  [facility]  [address] [place] [address]  Phone: [phone]  Fax: [phone]

## 2018-04-25 NOTE — MR AVS SNAPSHOT
After Visit Summary   4/25/2018    Grabiel Cotto    MRN: [id number]           Patient Information     Date Of Birth          1970        Visit Information        Provider Department      4/25/2018 1:30 PM Mir Covarrubias, PT [facility]        Today's Diagnoses     S/P right rotator cuff repair           Follow-ups after your visit        Your next 10 appointments already scheduled     May 02, 2018  1:30 PM CDT   EP[facility] Extremity with Richmond Asuma, PT   [facility] [place] [facility] ([facility] [place]    [address]  [place] [address]   [phone]            May 09, 2018  1:30 PM CDT   EPIFANIO Extremity with Richmond Asuma, PT   [facility] [place] [facility] ([facility] [place]    [address]  [place] [address]   [phone]            May 16, 2018  1:30 PM CDT   EPIFANIO Extremity with Richmond Asuma, PT   [facility] [place] [facility] ([facility] [place]    [address]  [place] [address]   [phone]            May 23, 2018  1:30 PM CDT   EPIFANIO Extremity with Richmond Asuma, PT   [facility] [place] [facility] ([facility] [place]    [address]  [place] [address]   [phone]              Who to contact     If you have questions or need follow up information about today's clinic visit or your schedule please contact [facility] directly at [phone].  Normal or non-critical lab and imaging results will be communicated to you by IActionablehart, letter or phone within 4 business days after the clinic has received the results. If you do not hear from us within 7 days, please contact the clinic through ZeePearlt or phone. If you have a critical or abnormal lab result, we will notify you by phone as soon as possible.  Submit refill requests through SocialGuides or call your pharmacy and they will forward the refill request to us. Please allow 3 business days for your refill to be completed.          Additional Information About Your Visit        MyChart Information     SocialGuides gives you  secure access to your electronic health record. If you see a primary care provider, you can also send messages to your care team and make appointments. If you have questions, please call your primary care clinic.  If you do not have a primary care provider, please call [phone] and they will assist you.        Care EveryWhere ID     This is your Care EveryWhere ID. This could be used by other organizations to access your [facility] medical records  [id number]         Blood Pressure from Last 3 Encounters:   02/08/18 138/85   11/10/17 139/89   10/23/17 135/78    Weight from Last 3 Encounters:   02/08/18 127 kg (280 lb)   11/16/17 127 kg (280 lb)   11/10/17 129.3 kg (285 lb)              We Performed the Following     [facility] PROGRESS NOTES REPORT     THERAPEUTIC EXERCISES        Primary Care Provider Office Phone # Fax #    Bertha Yip [phone] [address] [facility] [address]        Equal Access to Services     MYRNA ZIEGLER : Hadii aad ku hadasho Soomaali, waaxda luqadaha, qaybta kaalmada adeegyada, waxay idiin haymarveln livia medellin . So [facility] [phone].    ATENCIÓN: Si habla español, tiene a herndon disposición servicios gratuitos de asistencia lingüística. Llame al [phone].    We comply with applicable federal civil rights laws and [place] laws. We do not discriminate on the basis of race, color, national origin, age, disability, sex, sexual orientation, or gender identity.            Thank you!     Thank you for choosing [facility]  for your care. Our goal is always to provide you with excellent care. Hearing back from our patients is one way we can continue to improve our services. Please take a few minutes to complete the written survey that you may receive in the mail after your visit with us. Thank you!             Your Updated Medication List - Protect others around you: Learn how to safely use, store and throw away your medicines at  www.disposemymeds.org.      Notice  As of 4/25/2018  2:30 PM    You have not been prescribed any medications.

## 2018-05-02 ENCOUNTER — THERAPY VISIT (OUTPATIENT)
Dept: PHYSICAL THERAPY | Facility: CLINIC | Age: 48
End: 2018-05-02
Payer: OTHER MISCELLANEOUS

## 2018-05-02 DIAGNOSIS — Z98.890 S/P RIGHT ROTATOR CUFF REPAIR: ICD-10-CM

## 2018-05-02 PROCEDURE — 97110 THERAPEUTIC EXERCISES: CPT | Mod: GP | Performed by: PHYSICAL THERAPIST

## 2018-05-09 ENCOUNTER — THERAPY VISIT (OUTPATIENT)
Dept: PHYSICAL THERAPY | Facility: CLINIC | Age: 48
End: 2018-05-09
Payer: OTHER MISCELLANEOUS

## 2018-05-09 DIAGNOSIS — Z98.890 S/P RIGHT ROTATOR CUFF REPAIR: ICD-10-CM

## 2018-05-09 PROCEDURE — 97110 THERAPEUTIC EXERCISES: CPT | Mod: GP | Performed by: PHYSICAL THERAPIST

## 2018-05-09 PROCEDURE — 97112 NEUROMUSCULAR REEDUCATION: CPT | Mod: GP | Performed by: PHYSICAL THERAPIST

## 2018-05-16 ENCOUNTER — THERAPY VISIT (OUTPATIENT)
Dept: PHYSICAL THERAPY | Facility: CLINIC | Age: 48
End: 2018-05-16
Payer: OTHER MISCELLANEOUS

## 2018-05-16 DIAGNOSIS — Z98.890 S/P RIGHT ROTATOR CUFF REPAIR: ICD-10-CM

## 2018-05-16 PROCEDURE — 97112 NEUROMUSCULAR REEDUCATION: CPT | Mod: GP | Performed by: PHYSICAL THERAPIST

## 2018-05-16 PROCEDURE — 97110 THERAPEUTIC EXERCISES: CPT | Mod: GP | Performed by: PHYSICAL THERAPIST

## 2018-05-23 ENCOUNTER — THERAPY VISIT (OUTPATIENT)
Dept: PHYSICAL THERAPY | Facility: CLINIC | Age: 48
End: 2018-05-23
Payer: OTHER MISCELLANEOUS

## 2018-05-23 DIAGNOSIS — Z98.890 S/P RIGHT ROTATOR CUFF REPAIR: ICD-10-CM

## 2018-05-23 PROCEDURE — 97112 NEUROMUSCULAR REEDUCATION: CPT | Mod: GP | Performed by: PHYSICAL THERAPIST

## 2018-05-23 PROCEDURE — 97110 THERAPEUTIC EXERCISES: CPT | Mod: GP | Performed by: PHYSICAL THERAPIST

## 2018-05-23 NOTE — PROGRESS NOTES
Subjective:  HPI                    Objective:  System    Physical Exam    General     ROS    Assessment/Plan:    PROGRESS  REPORT    Progress reporting period is from 4/25/18 to 5/23/18.       SUBJECTIVE  Subjective changes noted by patient: Nish reports his shoulder is doing okay overall.  He is sore from falling on wet ground two days ago, but has recovered okay from that and he notes he mostly caught himself with his left arm.  Other than that he feels like his ROM is coming along and he is improving.  He is able to do more self cares and table level tasks.  His arm still feels really weak and is sore off and on, especially if he does too much.  Current pain level is 3/10.     Previous pain level was 8/10.   Changes in function:  Yes (See Goal flowsheet attached for changes in current functional level)  Adverse reaction to treatment or activity: None    OBJECTIVE  Changes noted in objective findings:  Yes  R shoulder AAROM:    flexion 155     scaption 125     ER 60     IR/EXT to L4       ASSESSMENT/PLAN  Updated problem list and treatment plan: Diagnosis 1:  S/p Right rotator cuff repair    Pain -  hot/cold therapy, manual therapy, self management, education and home program  Decreased ROM/flexibility - manual therapy, therapeutic exercise and home program  Decreased strength - therapeutic exercise, therapeutic activities and home program  Decreased proprioception - neuro re-education, therapeutic activities and home program  Decreased function - therapeutic activities and home program  STG/LTGs have been met or progress has been made towards goals:  Yes (See Goal flow sheet completed today.)  Assessment of Progress: The patient's condition is improving.  The patient's condition has potential to improve.  Patient is meeting short term goals and is progressing towards long term goals.  Self Management Plans:  Patient has been instructed in a home treatment program.  Patient  has been instructed in self management  of symptoms.    Grabiel continues to require the following intervention to meet STG and LTG's:  PT    Recommendations:  This patient would benefit from continued therapy.     Frequency:  1 X week, once daily  Duration:  for 10 weeks        Please refer to the daily flowsheet for treatment today, total treatment time and time spent performing 1:1 timed codes.

## 2018-05-23 NOTE — LETTER
[facility]  [address]  [place] [address]  [phone]    May 23, 2018    Re: Grabiel Cotto   :   1970  MRN:  [id number]   REFERRING PHYSICIAN:   [facility]    Date of Initial Evaluation:  18  Visits:  Rxs Used: 10  Reason for Referral:  S/P right rotator cuff repair    PROGRESS  REPORT  Progress reporting period is from 18 to 18.       SUBJECTIVE  Subjective changes noted by patient: Nish reports his shoulder is doing okay overall.  He is sore from falling on wet ground two days ago, but has recovered okay from that and he notes he mostly caught himself with his left arm.  Other than that he feels like his ROM is coming along and he is improving.  He is able to do more self cares and table level tasks.  His arm still feels really weak and is sore off and on, especially if he does too much.  Current pain level is 3/10.     Previous pain level was 8/10.   Changes in function:  Yes (See Goal flowsheet attached for changes in current functional level)  Adverse reaction to treatment or activity: None    OBJECTIVE  Changes noted in objective findings:  Yes  R shoulder AAROM:    flexion 155     scaption 125     ER 60     IR/EXT to L4       ASSESSMENT/PLAN  Updated problem list and treatment plan: Diagnosis 1:  S/p Right rotator cuff repair    Pain -  hot/cold therapy, manual therapy, self management, education and home program  Decreased ROM/flexibility - manual therapy, therapeutic exercise and home program  Decreased strength - therapeutic exercise, therapeutic activities and home program  Decreased proprioception - neuro re-education, therapeutic activities and home program  Decreased function - therapeutic activities and home program  STG/LTGs have been met or progress has been made towards goals:  Yes (See Goal flow sheet completed today.)  Assessment of Progress: The patient's condition is improving.  The patient's  condition has potential to improve.  Patient is meeting short term goals and is progressing towards long term goals.  Self Management Plans:  Patient has been instructed in a home treatment program.  Patient  has been instructed in self management of symptoms.    Grabiel continues to require the following intervention to meet STG and LTG's:  PT    Recommendations:  This patient would benefit from continued therapy.     Frequency:  1 X week, once daily  Duration:  for 10 weeks    Thank you for your referral.    INQUIRIES  Therapist: [place] Giang DPT,SCS  [facility]  [address]  [place] [address]  Phone: [phone]  Fax: [phone]

## 2018-05-30 ENCOUNTER — THERAPY VISIT (OUTPATIENT)
Dept: PHYSICAL THERAPY | Facility: CLINIC | Age: 48
End: 2018-05-30
Payer: OTHER MISCELLANEOUS

## 2018-05-30 DIAGNOSIS — Z98.890 S/P RIGHT ROTATOR CUFF REPAIR: ICD-10-CM

## 2018-05-30 PROCEDURE — 97110 THERAPEUTIC EXERCISES: CPT | Mod: GP | Performed by: PHYSICAL THERAPIST

## 2018-05-30 PROCEDURE — 97112 NEUROMUSCULAR REEDUCATION: CPT | Mod: GP | Performed by: PHYSICAL THERAPIST

## 2018-06-06 ENCOUNTER — THERAPY VISIT (OUTPATIENT)
Dept: PHYSICAL THERAPY | Facility: CLINIC | Age: 48
End: 2018-06-06
Payer: OTHER MISCELLANEOUS

## 2018-06-06 DIAGNOSIS — Z98.890 S/P RIGHT ROTATOR CUFF REPAIR: ICD-10-CM

## 2018-06-06 PROCEDURE — 97112 NEUROMUSCULAR REEDUCATION: CPT | Mod: GP | Performed by: PHYSICAL THERAPIST

## 2018-06-06 PROCEDURE — 97110 THERAPEUTIC EXERCISES: CPT | Mod: GP | Performed by: PHYSICAL THERAPIST

## 2018-06-06 NOTE — PROGRESS NOTES
Subjective:  HPI                    Objective:  System    Physical Exam    General     ROS    Assessment/Plan:    PROGRESS  REPORT    Progress reporting period is from 4/25/18 to 6/6/18.       SUBJECTIVE  Subjective changes noted by patient:  Nish reports his shoulder is coming along pretty well.  He is feeling better and better each week. He does not really have any sharp pains but there is a constant low grade ache. This is getting better, however.  He is anxious to start using his right arm for more activity, but his exercises are going well.    Current pain level is 2/10.     Previous pain level was 8/10.   Changes in function:  Yes, see above.  Adverse reaction to treatment or activity: None    OBJECTIVE  Changes noted in objective findings:  Yes  Right shoulder standing AROM:    flexion 110     abduction 85     ER 35      IR/EXT to L4      Right shoulder supine flexion AROM: 150    Supine right shoulder ER AAROM 55    Right shoulder IR sleeper PROM 30 degrees   Right shoulder standing AAROM wand 125-130 degrees    ASSESSMENT/PLAN  Updated problem list and treatment plan: Diagnosis 1:  S/p Right rotator cuff repair    Pain -  hot/cold therapy, manual therapy, self management, education and home program  Decreased ROM/flexibility - manual therapy, therapeutic exercise and home program  Decreased strength - therapeutic exercise, therapeutic activities and home program  Decreased proprioception - neuro re-education, therapeutic activities and home program  Decreased function - therapeutic activities and home program  STG/LTGs have been met or progress has been made towards goals:  Yes (See Goal flow sheet completed today.)  Assessment of Progress: The patient's condition is improving.  The patient's condition has potential to improve.  Patient is meeting short term goals and is progressing towards long term goals.  Self Management Plans:  Patient has been instructed in a home treatment program.  Patient  has been  instructed in self management of symptoms.    Grabiel continues to require the following intervention to meet STG and LTG's:  PT    Recommendations:  This patient would benefit from continued therapy.     Frequency:  1 X week, once daily  Duration:  for 8-12 weeks        Please refer to the daily flowsheet for treatment today, total treatment time and time spent performing 1:1 timed codes.

## 2018-06-06 NOTE — LETTER
[facility]  [address]  [place] [address]  [phone]    2018    Re: Grabiel Cotto   :   1970  MRN:  [id number]   REFERRING PHYSICIAN:   [facility]    Date of Initial Evaluation:  18  Visits:  Rxs Used: 12  Reason for Referral:  S/P right rotator cuff repair    PROGRESS  REPORT  Progress reporting period is from 18 to 18.       SUBJECTIVE  Subjective changes noted by patient:  Nish reports his shoulder is coming along pretty well.  He is feeling better and better each week. He does not really have any sharp pains but there is a constant low grade ache. This is getting better, however.  He is anxious to start using his right arm for more activity, but his exercises are going well.    Current pain level is 2/10.     Previous pain level was 8/10.   Changes in function:  Yes, see above.  Adverse reaction to treatment or activity: None    OBJECTIVE  Changes noted in objective findings:  Yes  Right shoulder standing AROM:    flexion 110     abduction 85     ER 35      IR/EXT to L4      Right shoulder supine flexion AROM: 150    Supine right shoulder ER AAROM 55    Right shoulder IR sleeper PROM 30 degrees   Right shoulder standing AAROM wand 125-130 degrees    ASSESSMENT/PLAN  Updated problem list and treatment plan: Diagnosis 1:  S/p Right rotator cuff repair    Pain -  hot/cold therapy, manual therapy, self management, education and home program  Decreased ROM/flexibility - manual therapy, therapeutic exercise and home program  Decreased strength - therapeutic exercise, therapeutic activities and home program  Decreased proprioception - neuro re-education, therapeutic activities and home program  Decreased function - therapeutic activities and home program  STG/LTGs have been met or progress has been made towards goals:  Yes (See Goal flow sheet completed today.)  Assessment of Progress: The patient's condition is  improving.  The patient's condition has potential to improve.  Patient is meeting short term goals and is progressing towards long term goals.  Self Management Plans:  Patient has been instructed in a home treatment program.  Patient  has been instructed in self management of symptoms.    Grabiel continues to require the following intervention to meet STG and LTG's:  PT    Recommendations:  This patient would benefit from continued therapy.     Frequency:  1 X week, once daily  Duration:  for 8-12 weeks    Thank you for your referral.    INQUIRIES  Therapist: [place] Giang DPT, [facility]  [address]  [place] [address]  Phone: [phone]  Fax: [phone]

## 2018-06-13 ENCOUNTER — THERAPY VISIT (OUTPATIENT)
Dept: PHYSICAL THERAPY | Facility: CLINIC | Age: 48
End: 2018-06-13
Payer: OTHER MISCELLANEOUS

## 2018-06-13 DIAGNOSIS — Z98.890 S/P RIGHT ROTATOR CUFF REPAIR: ICD-10-CM

## 2018-06-13 PROCEDURE — 97110 THERAPEUTIC EXERCISES: CPT | Mod: GP | Performed by: PHYSICAL THERAPIST

## 2018-06-13 PROCEDURE — 97112 NEUROMUSCULAR REEDUCATION: CPT | Mod: GP | Performed by: PHYSICAL THERAPIST

## 2018-06-20 ENCOUNTER — THERAPY VISIT (OUTPATIENT)
Dept: PHYSICAL THERAPY | Facility: CLINIC | Age: 48
End: 2018-06-20
Payer: OTHER MISCELLANEOUS

## 2018-06-20 DIAGNOSIS — Z98.890 S/P RIGHT ROTATOR CUFF REPAIR: ICD-10-CM

## 2018-06-20 PROCEDURE — 97110 THERAPEUTIC EXERCISES: CPT | Mod: GP | Performed by: PHYSICAL THERAPIST

## 2018-06-20 PROCEDURE — 97112 NEUROMUSCULAR REEDUCATION: CPT | Mod: GP | Performed by: PHYSICAL THERAPIST

## 2018-06-27 ENCOUNTER — THERAPY VISIT (OUTPATIENT)
Dept: PHYSICAL THERAPY | Facility: CLINIC | Age: 48
End: 2018-06-27
Payer: OTHER MISCELLANEOUS

## 2018-06-27 DIAGNOSIS — Z98.890 S/P RIGHT ROTATOR CUFF REPAIR: ICD-10-CM

## 2018-06-27 PROCEDURE — 97112 NEUROMUSCULAR REEDUCATION: CPT | Mod: GP | Performed by: PHYSICAL THERAPIST

## 2018-06-27 PROCEDURE — 97110 THERAPEUTIC EXERCISES: CPT | Mod: GP | Performed by: PHYSICAL THERAPIST

## 2018-07-11 ENCOUNTER — THERAPY VISIT (OUTPATIENT)
Dept: PHYSICAL THERAPY | Facility: CLINIC | Age: 48
End: 2018-07-11
Payer: OTHER MISCELLANEOUS

## 2018-07-11 DIAGNOSIS — Z98.890 S/P RIGHT ROTATOR CUFF REPAIR: ICD-10-CM

## 2018-07-11 PROCEDURE — 97110 THERAPEUTIC EXERCISES: CPT | Mod: GP | Performed by: PHYSICAL THERAPIST

## 2018-07-11 PROCEDURE — 97112 NEUROMUSCULAR REEDUCATION: CPT | Mod: GP | Performed by: PHYSICAL THERAPIST

## 2018-07-25 ENCOUNTER — THERAPY VISIT (OUTPATIENT)
Dept: PHYSICAL THERAPY | Facility: CLINIC | Age: 48
End: 2018-07-25
Payer: OTHER MISCELLANEOUS

## 2018-07-25 DIAGNOSIS — Z98.890 S/P RIGHT ROTATOR CUFF REPAIR: ICD-10-CM

## 2018-07-25 PROCEDURE — 97110 THERAPEUTIC EXERCISES: CPT | Mod: GP | Performed by: PHYSICAL THERAPIST

## 2018-07-25 PROCEDURE — 97140 MANUAL THERAPY 1/> REGIONS: CPT | Mod: GP | Performed by: PHYSICAL THERAPIST

## 2018-07-31 ENCOUNTER — THERAPY VISIT (OUTPATIENT)
Dept: PHYSICAL THERAPY | Facility: CLINIC | Age: 48
End: 2018-07-31
Payer: OTHER MISCELLANEOUS

## 2018-07-31 DIAGNOSIS — Z98.890 S/P RIGHT ROTATOR CUFF REPAIR: ICD-10-CM

## 2018-07-31 PROCEDURE — 97112 NEUROMUSCULAR REEDUCATION: CPT | Mod: GP | Performed by: PHYSICAL THERAPIST

## 2018-07-31 PROCEDURE — 97140 MANUAL THERAPY 1/> REGIONS: CPT | Mod: GP | Performed by: PHYSICAL THERAPIST

## 2018-07-31 PROCEDURE — 97110 THERAPEUTIC EXERCISES: CPT | Mod: GP | Performed by: PHYSICAL THERAPIST

## 2018-08-05 NOTE — PROGRESS NOTES
Please send letter informing the patient and attach SHOULDER and ELBOW XR results:    Dear Nish,     Your shoulder and elbow X-rays are within normal limits.      Please continue the treatment plan that we discussed at your last clinic visit and let me know if you have any questions via myseekit or by calling [phone].      Bertha Yip MD Jacquelyn Barger(NP)

## 2018-08-08 ENCOUNTER — THERAPY VISIT (OUTPATIENT)
Dept: PHYSICAL THERAPY | Facility: CLINIC | Age: 48
End: 2018-08-08
Payer: OTHER MISCELLANEOUS

## 2018-08-08 DIAGNOSIS — Z98.890 S/P RIGHT ROTATOR CUFF REPAIR: ICD-10-CM

## 2018-08-08 PROCEDURE — 97110 THERAPEUTIC EXERCISES: CPT | Mod: GP | Performed by: PHYSICAL THERAPIST

## 2018-08-08 PROCEDURE — 97112 NEUROMUSCULAR REEDUCATION: CPT | Mod: GP | Performed by: PHYSICAL THERAPIST

## 2018-08-08 PROCEDURE — 97140 MANUAL THERAPY 1/> REGIONS: CPT | Mod: GP | Performed by: PHYSICAL THERAPIST

## 2018-08-15 ENCOUNTER — THERAPY VISIT (OUTPATIENT)
Dept: PHYSICAL THERAPY | Facility: CLINIC | Age: 48
End: 2018-08-15
Payer: OTHER MISCELLANEOUS

## 2018-08-15 DIAGNOSIS — Z98.890 S/P RIGHT ROTATOR CUFF REPAIR: ICD-10-CM

## 2018-08-15 PROCEDURE — 97112 NEUROMUSCULAR REEDUCATION: CPT | Mod: GP | Performed by: PHYSICAL THERAPIST

## 2018-08-15 PROCEDURE — 97140 MANUAL THERAPY 1/> REGIONS: CPT | Mod: GP | Performed by: PHYSICAL THERAPIST

## 2018-08-15 PROCEDURE — 97110 THERAPEUTIC EXERCISES: CPT | Mod: GP | Performed by: PHYSICAL THERAPIST

## 2018-08-15 NOTE — LETTER
[facility]  [address]  [place] [address]  [phone]    August 15, 2018    Re: Grabiel Cotto   :   1970  MRN:  [id number]   REFERRING PHYSICIAN:   [facility]    Date of Initial Evaluation:  18  Visits:  Rxs Used: 20  Reason for Referral:  S/P right rotator cuff repair    PROGRESS  REPORT  Progress reporting period is from 18 to 8/15/18.       SUBJECTIVE  Subjective changes noted by patient:   Nish reports his shoulder is sore but gaining motion and strength.  It gets sore if he tries to do too much with it at home, like lifting groceries.  He continues to work on his home program to gain mobility and strength but the progress continues to be slow.      Current pain level is 3/10.     Previous pain level was 8/10.   Changes in function:  Yes, see above.  Adverse reaction to treatment or activity: None    OBJECTIVE  Changes noted in objective findings:  Yes  Right shoulder AROM prior to stretching:     flexion 150     abduction 130     ER 50     IR/EXT to L2      Right shoulder AROM post-stretching:     flexion 160     abduction 145     ER 55    IR/EXT to L1      Right shoulder strength:     flexion 4/5     scaption 4/5     ER 4+/5     IR 4+/5     ASSESSMENT/PLAN  Updated problem list and treatment plan: Diagnosis 1:  S/p right rotator cuff repair   Pain -  hot/cold therapy, manual therapy, self management, education and home program  Decreased ROM/flexibility - manual therapy, therapeutic exercise and home program  Decreased joint mobility - manual therapy, therapeutic exercise and home program  Decreased strength - therapeutic exercise, therapeutic activities and home program  Decreased proprioception - neuro re-education, therapeutic activities and home program  Decreased function - therapeutic activities and home program  STG/LTGs have been met or progress has been made towards goals:  Yes (See Goal flow sheet completed  today.)  Assessment of Progress: The patient's condition is improving.  The patient's condition has potential to improve.  Patient is meeting short term goals and is progressing towards long term goals.  Self Management Plans:  Patient has been instructed in a home treatment program.  Patient  has been instructed in self management of symptoms.    Grabiel continues to require the following intervention to meet STG and LTG's:  PT    Recommendations:  This patient would benefit from continued therapy.     Frequency:  1 X week, once daily  Duration:  for 8 weeks    Thank you for your referral.    INQUIRIES  Therapist: Juan M Giang, PT, DPT, SCS  [facility]  [address] [place] [address]  Phone: [phone]  Fax: [phone]

## 2018-08-15 NOTE — PROGRESS NOTES
Subjective:  HPI                    Objective:  System    Physical Exam    General     ROS    Assessment/Plan:    PROGRESS  REPORT    Progress reporting period is from 6/6/18 to 8/15/18.       SUBJECTIVE  Subjective changes noted by patient:   Nish reports his shoulder is sore but gaining motion and strength.  It gets sore if he tries to do too much with it at home, like lifting groceries.  He continues to work on his home program to gain mobility and strength but the progress continues to be slow.      Current pain level is 3/10.     Previous pain level was 8/10.   Changes in function:  Yes, see above.  Adverse reaction to treatment or activity: None    OBJECTIVE  Changes noted in objective findings:  Yes  Right shoulder AROM prior to stretching:     flexion 150     abduction 130     ER 50     IR/EXT to L2      Right shoulder AROM post-stretching:     flexion 160     abduction 145     ER 55    IR/EXT to L1      Right shoulder strength:     flexion 4/5     scaption 4/5     ER 4+/5     IR 4+/5     ASSESSMENT/PLAN  Updated problem list and treatment plan: Diagnosis 1:  S/p right rotator cuff repair   Pain -  hot/cold therapy, manual therapy, self management, education and home program  Decreased ROM/flexibility - manual therapy, therapeutic exercise and home program  Decreased joint mobility - manual therapy, therapeutic exercise and home program  Decreased strength - therapeutic exercise, therapeutic activities and home program  Decreased proprioception - neuro re-education, therapeutic activities and home program  Decreased function - therapeutic activities and home program  STG/LTGs have been met or progress has been made towards goals:  Yes (See Goal flow sheet completed today.)  Assessment of Progress: The patient's condition is improving.  The patient's condition has potential to improve.  Patient is meeting short term goals and is progressing towards long term goals.  Self Management Plans:  Patient has been  instructed in a home treatment program.  Patient  has been instructed in self management of symptoms.    Grabiel continues to require the following intervention to meet STG and LTG's:  PT    Recommendations:  This patient would benefit from continued therapy.     Frequency:  1 X week, once daily  Duration:  for 8 weeks        Please refer to the daily flowsheet for treatment today, total treatment time and time spent performing 1:1 timed codes.

## 2018-08-22 ENCOUNTER — THERAPY VISIT (OUTPATIENT)
Dept: PHYSICAL THERAPY | Facility: CLINIC | Age: 48
End: 2018-08-22
Payer: OTHER MISCELLANEOUS

## 2018-08-22 DIAGNOSIS — Z98.890 S/P RIGHT ROTATOR CUFF REPAIR: ICD-10-CM

## 2018-08-22 PROCEDURE — 97110 THERAPEUTIC EXERCISES: CPT | Mod: GP | Performed by: PHYSICAL THERAPIST

## 2018-08-22 PROCEDURE — 97112 NEUROMUSCULAR REEDUCATION: CPT | Mod: GP | Performed by: PHYSICAL THERAPIST

## 2018-08-22 PROCEDURE — 97140 MANUAL THERAPY 1/> REGIONS: CPT | Mod: GP | Performed by: PHYSICAL THERAPIST

## 2018-08-22 NOTE — PROGRESS NOTES
"Subjective:  HPI                    Objective:  System    Physical Exam    General     ROS    Assessment/Plan:    PROGRESS  REPORT    Progress reporting period is from 6/6/18 to 8/22/18.       SUBJECTIVE  Subjective changes noted by patient: Nish reports his shoulder feels okay as far as pain, although it does get achy.  The shoulder still feels stiff, despite his attempts to stretch it out at home and in PT.  He feels sometimes like \"it's too short.\"  He is gaining use of the right arm below the shoulder level but is weak above his shoulder.  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    Current pain level is 2/10.     Previous pain level was 8/10.   Changes in function:  Yes, see above.  Adverse reaction to treatment or activity: None    OBJECTIVE  Changes noted in objective findings:  Yes  Right shoulder AROM (after stretching):     flexion 160     scaption 150     ER 55     IR/EXT to T11      Right shoulder strength:     flexion 4+/5     scaption 4+/5    ER 4+/5     IR 4+/5     ASSESSMENT/PLAN  Updated problem list and treatment plan: Diagnosis 1:  S/p right rotator cuff repair    Pain -  hot/cold therapy, manual therapy, self management, education and home program  Decreased ROM/flexibility - manual therapy, therapeutic exercise and home program  Decreased joint mobility - manual therapy, therapeutic exercise and home program  Decreased strength - therapeutic exercise, therapeutic activities and home program  Decreased proprioception - neuro re-education, therapeutic activities and home program  Decreased function - therapeutic activities and home program  STG/LTGs have been met or progress has been made towards goals:  Yes (See Goal flow sheet completed today.)  Assessment of Progress: The patient's condition is improving.  The patient's condition has potential to improve.  Patient is meeting short term goals and is progressing towards long term " goals.  Self Management Plans:  Patient has been instructed in a home treatment program.  Patient  has been instructed in self management of symptoms.    Grabiel continues to require the following intervention to meet STG and LTG's:  PT    Recommendations:  This patient would benefit from continued therapy.     Frequency:  1 X week, once daily  Duration:  for 8-10 weeks to continue progressing toward full shoulder AROM and strength        Please refer to the daily flowsheet for treatment today, total treatment time and time spent performing 1:1 timed codes.

## 2018-08-22 NOTE — LETTER
"[facility]  [address]  [place] [address]  [phone]    2018    Re: Grabiel Cotto   :   1970  MRN:  [id number]   REFERRING PHYSICIAN:   [facility]    Date of Initial Evaluation:  3/21/18  Visits:  Rxs Used: 21  Reason for Referral:  S/P right rotator cuff repair    PROGRESS  REPORT  Progress reporting period is from 18 to 18.       SUBJECTIVE  Subjective changes noted by patient: Nish reports his shoulder feels okay as far as pain, although it does get achy.  The shoulder still feels stiff, despite his attempts to stretch it out at home and in PT.  He feels sometimes like \"it's too short.\"  He is gaining use of the right arm below the shoulder level but is weak above his shoulder.  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    Current pain level is 2/10.     Previous pain level was 8/10.   Changes in function:  Yes, see above.  Adverse reaction to treatment or activity: None    OBJECTIVE  Changes noted in objective findings:  Yes  Right shoulder AROM (after stretching):     flexion 160     scaption 150     ER 55     IR/EXT to T11      Right shoulder strength:     flexion 4+/5     scaption 4+/5    ER 4+/5     IR 4+/5     ASSESSMENT/PLAN  Updated problem list and treatment plan: Diagnosis 1:  S/p right rotator cuff repair    Pain -  hot/cold therapy, manual therapy, self management, education and home program  Decreased ROM/flexibility - manual therapy, therapeutic exercise and home program  Decreased joint mobility - manual therapy, therapeutic exercise and home program  Decreased strength - therapeutic exercise, therapeutic activities and home program  Decreased proprioception - neuro re-education, therapeutic activities and home program  Decreased function - therapeutic activities and home program  STG/LTGs have been met or progress has been made towards " goals:  Yes (See Goal flow sheet completed today.)  Assessment of Progress: The patient's condition is improving.  The patient's condition has potential to improve.  Patient is meeting short term goals and is progressing towards long term goals.  Self Management Plans:  Patient has been instructed in a home treatment program.  Patient  has been instructed in self management of symptoms.    Grabiel continues to require the following intervention to meet STG and LTG's:  PT    Recommendations:  This patient would benefit from continued therapy.     Frequency:  1 X week, once daily  Duration:  for 4 weeks to continue progressing toward full shoulder AROM and strength    Thank you for your referral.    INQUIRIES  Therapist: Juan M Giang, PT, DPT, SCS  [facility]  [address] [place] [address]  Phone: [phone]  Fax: [phone]

## 2018-08-29 ENCOUNTER — TELEPHONE (OUTPATIENT)
Dept: PHYSICAL THERAPY | Facility: CLINIC | Age: 48
End: 2018-08-29

## 2018-08-29 ENCOUNTER — THERAPY VISIT (OUTPATIENT)
Dept: PHYSICAL THERAPY | Facility: CLINIC | Age: 48
End: 2018-08-29
Payer: OTHER MISCELLANEOUS

## 2018-08-29 DIAGNOSIS — Z98.890 S/P RIGHT ROTATOR CUFF REPAIR: ICD-10-CM

## 2018-08-29 PROCEDURE — 97140 MANUAL THERAPY 1/> REGIONS: CPT | Mod: GP | Performed by: PHYSICAL THERAPIST

## 2018-08-29 PROCEDURE — 97110 THERAPEUTIC EXERCISES: CPT | Mod: GP | Performed by: PHYSICAL THERAPIST

## 2018-08-29 PROCEDURE — 97112 NEUROMUSCULAR REEDUCATION: CPT | Mod: GP | Performed by: PHYSICAL THERAPIST

## 2018-08-29 NOTE — TELEPHONE ENCOUNTER
8/29/18 Faxed request for 4 additional PT visits (including today's visit) to [facility] at [facility] ([phone]) along with MD orders and progress note from 8/22/18

## 2018-09-07 ENCOUNTER — THERAPY VISIT (OUTPATIENT)
Dept: PHYSICAL THERAPY | Facility: CLINIC | Age: 48
End: 2018-09-07
Payer: OTHER MISCELLANEOUS

## 2018-09-07 DIAGNOSIS — Z98.890 S/P RIGHT ROTATOR CUFF REPAIR: ICD-10-CM

## 2018-09-07 PROCEDURE — 97110 THERAPEUTIC EXERCISES: CPT | Mod: GP | Performed by: PHYSICAL THERAPIST

## 2018-09-07 PROCEDURE — 97140 MANUAL THERAPY 1/> REGIONS: CPT | Mod: GP | Performed by: PHYSICAL THERAPIST

## 2018-09-07 PROCEDURE — 97112 NEUROMUSCULAR REEDUCATION: CPT | Mod: GP | Performed by: PHYSICAL THERAPIST

## 2018-09-19 ENCOUNTER — THERAPY VISIT (OUTPATIENT)
Dept: PHYSICAL THERAPY | Facility: CLINIC | Age: 48
End: 2018-09-19
Payer: OTHER MISCELLANEOUS

## 2018-09-19 DIAGNOSIS — Z98.890 S/P RIGHT ROTATOR CUFF REPAIR: ICD-10-CM

## 2018-09-19 PROCEDURE — 97112 NEUROMUSCULAR REEDUCATION: CPT | Mod: GP | Performed by: PHYSICAL THERAPIST

## 2018-09-19 PROCEDURE — 97110 THERAPEUTIC EXERCISES: CPT | Mod: GP | Performed by: PHYSICAL THERAPIST

## 2018-09-19 PROCEDURE — 97140 MANUAL THERAPY 1/> REGIONS: CPT | Mod: GP | Performed by: PHYSICAL THERAPIST

## 2018-09-26 ENCOUNTER — THERAPY VISIT (OUTPATIENT)
Dept: PHYSICAL THERAPY | Facility: CLINIC | Age: 48
End: 2018-09-26
Payer: OTHER MISCELLANEOUS

## 2018-09-26 DIAGNOSIS — Z98.890 S/P RIGHT ROTATOR CUFF REPAIR: ICD-10-CM

## 2018-09-26 PROCEDURE — 97110 THERAPEUTIC EXERCISES: CPT | Mod: GP | Performed by: PHYSICAL THERAPIST

## 2018-09-26 PROCEDURE — 97140 MANUAL THERAPY 1/> REGIONS: CPT | Mod: GP | Performed by: PHYSICAL THERAPIST

## 2018-09-26 PROCEDURE — 97112 NEUROMUSCULAR REEDUCATION: CPT | Mod: GP | Performed by: PHYSICAL THERAPIST

## 2018-09-26 NOTE — LETTER
[facility]  [address]  [place] [address]  [phone]    2018    Re: Grabiel Cotto   :   1970  MRN:  [id number]   REFERRING PHYSICIAN:   [facility]    Date of Initial Evaluation:  3/21/18  Visits:  Rxs Used: 25  Reason for Referral:  S/P right rotator cuff repair    PROGRESS  REPORT  Progress reporting period is from 3/21/18 to 18.       SUBJECTIVE  Subjective changes noted by patient: Nish reports his shoulder is doing okay and getting better little by little. 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  He is using his right arm for more and more tasks around home, such as climbing ladders, shoveling, raking.  He notes some trouble sleeping as he has difficulty getting comfortable sometimes.  He can tell he needs to gain more strength in his right arm.  Although the arm still gets sore, he no longer has the pain that requires him to take ibuprofen to get relief.  Current pain level is 2/10.     Previous pain level was 8/10.   Changes in function:  Yes, see above.  Adverse reaction to treatment or activity: None    OBJECTIVE  Changes noted in objective findings:  Yes  Right shoulder AROM:     flexion 165     scaption 160     abduction 160     ER 55 IR/EXT to T11  (left to T9).    Right shoulder strength:     flexion 4+/5     scaption 4+/5     ER 4+/5     IR 4+/5     ASSESSMENT/PLAN  Updated problem list and treatment plan: Diagnosis 1:  S/p right rotator cuff repair    Pain -  home program  Decreased ROM/flexibility - home program  Decreased strength - home program  Decreased proprioception - home program  Decreased function - home program  STG/LTGs have been met or progress has been made towards goals:  Yes (See Goal flow sheet completed today.)  Assessment of Progress: The patient's condition is improving.  The patient's condition has potential to improve.  Patient is meeting short term goals  and is progressing towards long term goals.  Self Management Plans:  Patient has been instructed in a home treatment program.  Patient  has been instructed in self management of symptoms.  Grabiel continues to require the following intervention to meet STG and LTG's:  HEP during work hardening    Recommendations:  Nish will continue his home program independently over the next month as he goes through work hardening and beyond.  He will see his surgeon at the end of October to determine his readiness for return to work.  If he is able to return to work he will be discharged from PT.    Thank you for your referral.    INQUIRIES  Therapist: Juan M Giang, PT, DPT, SCS  [facility]  [address] [place] [address]  Phone: [phone]  Fax: [phone]

## 2018-09-26 NOTE — PROGRESS NOTES
Subjective:  HPI                    Objective:  System    Physical Exam    General     ROS    Assessment/Plan:    PROGRESS  REPORT    Progress reporting period is from 3/21/18 to 9/26/18.       SUBJECTIVE  Subjective changes noted by patient: Nish reports his shoulder is doing okay and getting better little by little. 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  He is using his right arm for more and more tasks around home, such as climbing ladders, shoveling, raking.  He notes some trouble sleeping as he has difficulty getting comfortable sometimes.  He can tell he needs to gain more strength in his right arm.  Although the arm still gets sore, he no longer has the pain that requires him to take ibuprofen to get relief.  Current pain level is 2/10.     Previous pain level was 8/10.   Changes in function:  Yes, see above.  Adverse reaction to treatment or activity: None    OBJECTIVE  Changes noted in objective findings:  Yes  Right shoulder AROM:     flexion 165     scaption 160     abduction 160     ER 55 IR/EXT to T11  (left to T9).    Right shoulder strength:     flexion 4+/5     scaption 4+/5     ER 4+/5     IR 4+/5     ASSESSMENT/PLAN  Updated problem list and treatment plan: Diagnosis 1:  S/p right rotator cuff repair    Pain -  home program  Decreased ROM/flexibility - home program  Decreased strength - home program  Decreased proprioception - home program  Decreased function - home program  STG/LTGs have been met or progress has been made towards goals:  Yes (See Goal flow sheet completed today.)  Assessment of Progress: The patient's condition is improving.  The patient's condition has potential to improve.  Patient is meeting short term goals and is progressing towards long term goals.  Self Management Plans:  Patient has been instructed in a home treatment program.  Patient  has been instructed in self management of symptoms.    Grabiel continues to require the following intervention  to meet STG and LTG's:  HEP during work hardening    Recommendations:  Nish will continue his home program independently over the next month as he goes through work hardening and beyond.  He will see his surgeon at the end of October to determine his readiness for return to work.  If he is able to return to work he will be discharged from PT.    Please refer to the daily flowsheet for treatment today, total treatment time and time spent performing 1:1 timed codes.

## 2019-01-22 ENCOUNTER — OFFICE VISIT (OUTPATIENT)
Dept: FAMILY MEDICINE | Facility: CLINIC | Age: 49
End: 2019-01-22
Payer: COMMERCIAL

## 2019-01-22 VITALS
HEART RATE: 92 BPM | TEMPERATURE: 98.2 F | HEIGHT: 74 IN | BODY MASS INDEX: 33.62 KG/M2 | DIASTOLIC BLOOD PRESSURE: 82 MMHG | RESPIRATION RATE: 18 BRPM | OXYGEN SATURATION: 97 % | WEIGHT: 262 LBS | SYSTOLIC BLOOD PRESSURE: 123 MMHG

## 2019-01-22 DIAGNOSIS — R07.0 THROAT PAIN: Primary | ICD-10-CM

## 2019-01-22 DIAGNOSIS — J40 BRONCHITIS: ICD-10-CM

## 2019-01-22 DIAGNOSIS — Z12.11 SCREEN FOR COLON CANCER: ICD-10-CM

## 2019-01-22 LAB
DEPRECATED S PYO AG THROAT QL EIA: NORMAL
SPECIMEN SOURCE: NORMAL

## 2019-01-22 PROCEDURE — 87880 STREP A ASSAY W/OPTIC: CPT | Performed by: PHYSICIAN ASSISTANT

## 2019-01-22 PROCEDURE — 99214 OFFICE O/P EST MOD 30 MIN: CPT | Performed by: PHYSICIAN ASSISTANT

## 2019-01-22 PROCEDURE — 87081 CULTURE SCREEN ONLY: CPT | Performed by: PHYSICIAN ASSISTANT

## 2019-01-22 RX ORDER — PREDNISONE 20 MG/1
20 TABLET ORAL DAILY
Qty: 7 TABLET | Refills: 0 | Status: SHIPPED | OUTPATIENT
Start: 2019-01-22 | End: 2019-06-28

## 2019-01-22 RX ORDER — ALBUTEROL SULFATE 90 UG/1
2 AEROSOL, METERED RESPIRATORY (INHALATION) EVERY 6 HOURS
Qty: 1 G | Refills: 1 | Status: SHIPPED | OUTPATIENT
Start: 2019-01-22 | End: 2019-06-28

## 2019-01-22 RX ORDER — CODEINE PHOSPHATE AND GUAIFENESIN 10; 100 MG/5ML; MG/5ML
1-2 SOLUTION ORAL EVERY 4 HOURS PRN
Qty: 236 ML | Refills: 0 | Status: SHIPPED | OUTPATIENT
Start: 2019-01-22 | End: 2019-06-28

## 2019-01-22 ASSESSMENT — MIFFLIN-ST. JEOR: SCORE: 2118.17

## 2019-01-22 NOTE — PATIENT INSTRUCTIONS
Do not drive with or mix cough syrup with alcohol. This can make you more tired/sedated.     If you get fevers or do not feel better in 3 days let me know and I will prescribe antibiotic    Use inhaler as needed for wheezing/sob

## 2019-01-22 NOTE — PROGRESS NOTES
SUBJECTIVE:                                                    Grabiel Cotto is a 48 year old male who presents to clinic today for the following health issues:    ENT Symptoms             Symptoms: cc Present Absent Comment   Fever/Chills  x  Sweats no documented fevers   Fatigue  x     Muscle Aches  x     Eye Irritation   x    Sneezing  x     Nasal Tushar/Drg  x     Sinus Pressure/Pain  x     Loss of smell   x    Dental pain   x    Sore Throat  x     Swollen Glands  x     Ear Pain/Fullness  x     Cough  x     Wheeze  x     Chest Pain   x    Shortness of breath  x  With cough   Rash   x    Other   x      Symptom duration:  x 1 week   Symptom severity:  mild   Treatments tried:  none   Contacts:  Family         Started off with sore throat then progressed to ear pain, tinnitis and coughing a lot. No sinus pressure or pain. Yellow sputum production. Has been very tired.     no h/o pneumonia. Never needed inhaler.     Cough is worse now.   No recent antibiotics.       Colon cancer screening is due per pt.  Would think about fit card.         Problem list and histories reviewed & adjusted, as indicated.  Additional history: as documented    Patient Active Problem List   Diagnosis     Right shoulder pain, unspecified chronicity     Supraspinatus tendon tear, right, initial encounter     Impingement syndrome of right shoulder     AC (acromioclavicular) joint arthritis     Complete tear of right rotator cuff     S/P right rotator cuff repair     Past Surgical History:   Procedure Laterality Date     SHOULDER SURGERY      R shoulder surgery       Social History     Tobacco Use     Smoking status: Former Smoker     Last attempt to quit: 10/23/2015     Years since quitting: 3.2     Smokeless tobacco: Never Used   Substance Use Topics     Alcohol use: Yes     Comment: occ/SOC     Family History   Problem Relation Age of Onset     Cancer Father      Dementia Maternal Grandfather      Cancer Maternal Grandfather      Cancer  "Paternal Grandfather          No current outpatient medications on file.     No Known Allergies    ROS:  Constitutional, HEENT, cardiovascular, pulmonary, gi and gu systems are negative, except as otherwise noted.    OBJECTIVE:     /82   Pulse 92   Temp 98.2  F (36.8  C) (Oral)   Resp 18   Ht 1.88 m (6' 2\")   Wt 118.8 kg (262 lb)   SpO2 97%   BMI 33.64 kg/m    Body mass index is 33.64 kg/m .  GENERAL:  No acute distress.  Interacts appropriately.  Breathing without difficulty.  Alert.  HEENT:  Tympanic membranes intact without effusion or erythema.  Oral mucosa moist. Posterior pharynx has no erythema.  Posterior pharynx has no exudate. no edema.  NECK:  Soft and supple.  without tenderness.  no lymphadenopathy.  Normal range of motion.    CARDIAC:   Regular rate and rhythm.  No murmurs, rubs, or gallops.   PULMONARY: diffuse expiratory wheezing throughout, no crackles.   Normal air exchange/breath sounds.  No use of accessory muscles.    PSYCH: Normal affect.  SKIN: No rashes.      Results for orders placed or performed in visit on 01/22/19   Strep, Rapid Screen   Result Value Ref Range    Specimen Description Throat     Rapid Strep A Screen       NEGATIVE: No Group A streptococcal antigen detected by immunoassay, await culture report.       ASSESSMENT/PLAN:     ASSESSMENT / PLAN:  (R07.0) Throat pain  (primary encounter diagnosis)  Comment:   Plan: Strep, Rapid Screen, Beta strep group A culture            (Z12.11) Screen for colon cancer  Comment:   Plan: he declines for now, will let me know we discussed all options today    (J40) Bronchitis  Comment: suspect viral, see below. As he has tinnitis but ears look normal, suspect from fluid/ETD.  Prednisone side effects discussed. To emergency room with chest pain or worsening shortness of breath   Plan: albuterol (PROAIR HFA/PROVENTIL HFA/VENTOLIN         HFA) 108 (90 Base) MCG/ACT inhaler,         guaiFENesin-codeine (ROBITUSSIN AC) 100-10         " MG/5ML solution, predniSONE (DELTASONE) 20 MG         tablet         Consider cxr if not improving but exam not consistent with pneumonia  Consider adding azithromycin in future if needed    Patient Instructions   Do not drive with or mix cough syrup with alcohol. This can make you more tired/sedated.     If you get fevers or do not feel better in 3 days let me know and I will prescribe antibiotic    Use inhaler as needed for wheezing/sob     Return in about 1 week (around 1/29/2019) for if not improving.        Cheryle Finch PA-C  [facility]

## 2019-01-23 LAB
BACTERIA SPEC CULT: NORMAL
SPECIMEN SOURCE: NORMAL

## 2019-06-17 PROBLEM — S46.811A STRAIN OF OTHER MUSCLES, FASCIA AND TENDONS AT SHOULDER AND UPPER ARM LEVEL, RIGHT ARM, INITIAL ENCOUNTER: Status: ACTIVE | Noted: 2017-11-14

## 2019-06-17 NOTE — PROGRESS NOTES
[facility]  [address] [facility] [address] [phone]  Dept: [phone]    PRE-OP EVALUATION:  Today's date: 2019      WORK COMP:      Grabiel Cotto (: 1970) presents for pre-operative evaluation assessment as requested by Dr. Dallas.  He requires evaluation and anesthesia risk assessment prior to undergoing surgery/procedure for treatment of R Shoulder.     Proposed Surgery/ Procedure: Clean out R rotator cuff  Date of Surgery/ Procedure: 2019  Time of Surgery/ Procedure: [facility]  Hospital/Surgical Facility: [facility]  Fax number for surgical facility: [phone]  Primary Physician: No Ref-Primary, Physician  Type of Anesthesia Anticipated: to be determined    Patient has a Health Care Directive or Living Will:  NO    1. NO - Do you have a history of heart attack, stroke, stent, bypass or surgery on an artery in the head, neck, heart or legs?  2. NO - Do you ever have any pain or discomfort in your chest?  3. NO - Do you have a history of  Heart Failure?  4. NO - Are you troubled by shortness of breath when: walking on the level, up a slight hill or at night?  5. NO - Do you currently have a cold, bronchitis or other respiratory infection?  6. NO - Do you have a cough, shortness of breath or wheezing?  7. NO - Do you sometimes get pains in the calves of your legs when you walk?  8. NO - Do you or anyone in your family have previous history of blood clots?  9. NO - Do you or does anyone in your family have a serious bleeding problem such as prolonged bleeding following surgeries or cuts?  10. NO - Have you ever had problems with anemia or been told to take iron pills?  11. NO - Have you had any abnormal blood loss such as black, tarry or bloody stools, or abnormal vaginal bleeding?  12. NO - Have you ever had a blood transfusion?  13. NO - Have you or any of your relatives ever had problems with anesthesia?  14. NO - Do you have sleep apnea, excessive  snoring or daytime drowsiness?  15. NO - Do you have any prosthetic heart valves?  16. NO - Do you have prosthetic joints?  17. NO - Is there any chance that you may be pregnant?      Colon cancer screening is due.    HPI:     HPI related to upcoming procedure: r ROTATOR cuff tear -will be having second surgery. Initial injury was at work.  Had first surgery in 2018.  This was from an initial work comp injury. He surgery on it previously but now has to have the scar tissue cleaned up as he has pain still.        See problem list for active medical problems.  Problems all longstanding and stable, except as noted/documented.  See ROS for pertinent symptoms related to these conditions.      MEDICAL HISTORY:     Patient Active Problem List    Diagnosis Date Noted     S/P right rotator cuff repair 03/21/2018     Priority: Medium     Impingement syndrome of right shoulder 11/16/2017     Priority: Medium     AC (acromioclavicular) joint arthritis 11/16/2017     Priority: Medium     Complete tear of right rotator cuff 11/16/2017     Priority: Medium     Supraspinatus tendon tear, right, initial encounter 11/14/2017     Priority: Medium     Right shoulder pain, unspecified chronicity 10/24/2017     Priority: Medium      Past Medical History:   Diagnosis Date     NO ACTIVE PROBLEMS      Past Surgical History:   Procedure Laterality Date     SHOULDER SURGERY      R shoulder surgery     No current outpatient medications on file.     OTC products: None, except as noted above    No Known Allergies   Latex Allergy: NO    Social History     Tobacco Use     Smoking status: Former Smoker     Last attempt to quit: 10/23/2015     Years since quitting: 3.6     Smokeless tobacco: Never Used   Substance Use Topics     Alcohol use: Yes     Comment: occ/SOC     History   Drug Use No       REVIEW OF SYSTEMS:   Constitutional, neuro, ENT, endocrine, pulmonary, cardiac, gastrointestinal, genitourinary, musculoskeletal, integument and  psychiatric systems are negative, except as otherwise noted.    EXAM:   /85   Pulse 85   Temp 98.1  F (36.7  C) (Oral)   Resp 18   Wt 120.7 kg (266 lb)   SpO2 97%   BMI 34.15 kg/m      GENERAL APPEARANCE: healthy, alert and no distress     EYES: EOMI,  PERRL     HENT: ear canals and TM's normal and nose and mouth without ulcers or lesions     NECK: no adenopathy, no asymmetry, masses, or scars and thyroid normal to palpation     RESP: lungs clear to auscultation - no rales, rhonchi or wheezes     CV: regular rates and rhythm, normal S1 S2, no S3 or S4 and no murmur, click or rub     ABDOMEN:  soft, nontender, no HSM or masses and bowel sounds normal     MS: extremities normal- no gross deformities noted, no evidence of inflammation in joints, FROM in all extremities.     SKIN: no suspicious lesions or rashes     NEURO: Normal strength and tone, sensory exam grossly normal, mentation intact and speech normal     PSYCH: mentation appears normal. and affect normal/bright     LYMPHATICS: No cervical adenopathy    DIAGNOSTICS:   EKG: appears normal, NSR, normal axis, normal intervals, no acute ST/T changes c/w ischemia, no LVH by voltage criteria, unchanged from previous tracings    Recent Labs   Lab Test 02/08/18  1151   HGB 15.1     Results for orders placed or performed in visit on 06/28/19   Hemoglobin   Result Value Ref Range    Hemoglobin 15.8 13.3 - 17.7 g/dL           IMPRESSION:   Reason for surgery/procedure: rotator cuff surgery--remove scar tissue  Diagnosis/reason for consult: pain    The proposed surgical procedure is considered INTERMEDIATE risk.    REVISED CARDIAC RISK INDEX  The patient has the following serious cardiovascular risks for perioperative complications such as (MI, PE, VFib and 3  AV Block):  No serious cardiac risks  INTERPRETATION: 0 risks: Class I (very low risk - 0.4% complication rate)    The patient has the following additional risks for perioperative complications:  No  identified additional risks      ICD-10-CM    1. Preop general physical exam Z01.818 EKG 12-lead complete w/read - Clinics     Basic metabolic panel     Hemoglobin   2. Complete tear of right rotator cuff, unspecified whether traumatic M75.121 Basic metabolic panel     Hemoglobin       RECOMMENDATIONS:         --Patient is to take all scheduled medications on the day of surgery EXCEPT for modifications listed below.    APPROVAL GIVEN to proceed with proposed procedure, without further diagnostic evaluation       Signed Electronically by: Cheryle Finch PA-C  Agreed with above. Efren Parisi M.D.      Copy of this evaluation report is provided to requesting physician.    [facility] Preop Guidelines    Revised Cardiac Risk Index

## 2019-06-28 ENCOUNTER — OFFICE VISIT (OUTPATIENT)
Dept: FAMILY MEDICINE | Facility: CLINIC | Age: 49
End: 2019-06-28
Payer: OTHER MISCELLANEOUS

## 2019-06-28 ENCOUNTER — OFFICE VISIT (OUTPATIENT)
Dept: FAMILY MEDICINE | Facility: CLINIC | Age: 49
End: 2019-06-28
Payer: COMMERCIAL

## 2019-06-28 VITALS
TEMPERATURE: 98.1 F | RESPIRATION RATE: 18 BRPM | SYSTOLIC BLOOD PRESSURE: 131 MMHG | BODY MASS INDEX: 34.15 KG/M2 | DIASTOLIC BLOOD PRESSURE: 85 MMHG | WEIGHT: 266 LBS | HEART RATE: 85 BPM | OXYGEN SATURATION: 97 %

## 2019-06-28 DIAGNOSIS — Z12.11 SPECIAL SCREENING FOR MALIGNANT NEOPLASMS, COLON: Primary | ICD-10-CM

## 2019-06-28 DIAGNOSIS — Z01.818 PREOP GENERAL PHYSICAL EXAM: Primary | ICD-10-CM

## 2019-06-28 DIAGNOSIS — M75.121 COMPLETE TEAR OF RIGHT ROTATOR CUFF, UNSPECIFIED WHETHER TRAUMATIC: ICD-10-CM

## 2019-06-28 LAB
ANION GAP SERPL CALCULATED.3IONS-SCNC: 6 MMOL/L (ref 3–14)
BUN SERPL-MCNC: 17 MG/DL (ref 7–30)
CALCIUM SERPL-MCNC: 9 MG/DL (ref 8.5–10.1)
CHLORIDE SERPL-SCNC: 106 MMOL/L (ref 94–109)
CO2 SERPL-SCNC: 28 MMOL/L (ref 20–32)
CREAT SERPL-MCNC: 1 MG/DL (ref 0.66–1.25)
GFR SERPL CREATININE-BSD FRML MDRD: 87 ML/MIN/{1.73_M2}
GLUCOSE SERPL-MCNC: 95 MG/DL (ref 70–99)
HGB BLD-MCNC: 15.8 G/DL (ref 13.3–17.7)
POTASSIUM SERPL-SCNC: 4.7 MMOL/L (ref 3.4–5.3)
SODIUM SERPL-SCNC: 140 MMOL/L (ref 133–144)

## 2019-06-28 PROCEDURE — 80048 BASIC METABOLIC PNL TOTAL CA: CPT | Performed by: PHYSICIAN ASSISTANT

## 2019-06-28 PROCEDURE — 85018 HEMOGLOBIN: CPT | Performed by: PHYSICIAN ASSISTANT

## 2019-06-28 PROCEDURE — 93000 ELECTROCARDIOGRAM COMPLETE: CPT | Performed by: PHYSICIAN ASSISTANT

## 2019-06-28 PROCEDURE — 99214 OFFICE O/P EST MOD 30 MIN: CPT | Performed by: PHYSICIAN ASSISTANT

## 2019-06-28 PROCEDURE — 99212 OFFICE O/P EST SF 10 MIN: CPT | Performed by: PHYSICIAN ASSISTANT

## 2019-06-28 PROCEDURE — 36415 COLL VENOUS BLD VENIPUNCTURE: CPT | Performed by: PHYSICIAN ASSISTANT

## 2019-06-28 NOTE — PROGRESS NOTES
Faxed pre op, labs and EKG to [facility] @ [phone].Saida Persaud MA/ZIA

## 2019-06-28 NOTE — PROGRESS NOTES
Subjective     Grabiel Cotto is a 49 year old male who presents to clinic today for the following health issues:    HPI   Patient is here to discuss preventive needs.   Due for colonoscopy. Patient prefers fit. No FH of colon cancer.   No weight loss or blood in stool.       Patient Active Problem List   Diagnosis     Right shoulder pain, unspecified chronicity     Supraspinatus tendon tear, right, initial encounter     Impingement syndrome of right shoulder     AC (acromioclavicular) joint arthritis     Complete tear of right rotator cuff     S/P right rotator cuff repair     Past Surgical History:   Procedure Laterality Date     SHOULDER SURGERY      R shoulder surgery       Social History     Tobacco Use     Smoking status: Former Smoker     Last attempt to quit: 10/23/2015     Years since quitting: 3.6     Smokeless tobacco: Never Used   Substance Use Topics     Alcohol use: Yes     Comment: occ/SOC     Family History   Problem Relation Age of Onset     Cancer Father      Dementia Maternal Grandfather      Cancer Maternal Grandfather      Cancer Paternal Grandfather          No current outpatient medications on file.     No Known Allergies    [id number]  Reviewed and updated as needed this visit by Provider         Review of Systems   ROS COMP: Constitutional, HEENT, cardiovascular, pulmonary, gi and gu systems are negative, except as otherwise noted.      Objective    There were no vitals taken for this visit.  There is no height or weight on file to calculate BMI.  Physical Exam   GENERAL: healthy, alert and no distress  RESP: lungs clear to auscultation - no rales, rhonchi or wheezes  CV: regular rate and rhythm, normal S1 S2, no S3 or S4, no murmur, click or rub, no peripheral edema and peripheral pulses strong  MS: no gross musculoskeletal defects noted, no edema    Diagnostic Test Results:  Labs reviewed in Epic        Assessment & Plan     1. Special screening for malignant neoplasms, colon    - Fecal  colorectal cancer screen (FIT); Future       Cheyrle Finch PA-C  [facility]

## 2019-06-28 NOTE — RESULT ENCOUNTER NOTE
Bandar [place],       Your recent test results are attached, if you have any questions or concerns please feel free to contact me via e-mail or call [phone].  Hemoglobin normal no anemia. Kidney function and blood sugar normal.        It was a pleasure to see you at your recent office visit.      Sincerely,  Cheryle Finch PA-C

## 2019-07-26 ENCOUNTER — THERAPY VISIT (OUTPATIENT)
Dept: PHYSICAL THERAPY | Facility: CLINIC | Age: 49
End: 2019-07-26
Payer: OTHER MISCELLANEOUS

## 2019-07-26 DIAGNOSIS — M25.511 SHOULDER PAIN, RIGHT: Primary | ICD-10-CM

## 2019-07-26 PROCEDURE — 97110 THERAPEUTIC EXERCISES: CPT | Mod: GP | Performed by: PHYSICAL THERAPIST

## 2019-07-26 NOTE — PROGRESS NOTES
[facility] Initial Evaluation  Subjective:    Type of problem:  Right shoulder   Condition occurred with:  Repetition/overuse. This is a new condition   Problem details: Pt had capsular release on 7/8/19 by Dr. Dallas.   Patient reports pain:  Anterior and lateral. Radiates to:  Upper arm. Associated symptoms:  Loss of motion/stiffness. Symptoms are exacerbated by certain positions and using arm behind back and relieved by ice and activity/movement.                      Objective:        SHOULDER:    Cervical Screen: normal and painfree    Shoulder:   PROM L PROM R AROM L AROM R MMT L MMT R   Flex   178 160 5/5 4/5   Abd   178 151 5/5 4/5   Full Can         Empty Can         IR   T6 T10 with effort and pain 5/5 5-/5   ER   80 66 5/5 4/5   Ext/IR           Scapulothoraic Rhythm: early and excessive R upper trap compensation during scaption    Palpation: no TTP    Special tests: deferred due to known surgery    GH Mobility  L  R   Posterior glide wnl limited   Inferior glide wnl wnl   Anterior glide wnl wnl              System    Physical Exam    General     ROS    Assessment/Plan:    Patient is a 49 year old male with right side shoulder complaints.    Patient has the following significant findings with corresponding treatment plan.                Diagnosis 1:  S/p R capsular release  Decreased ROM/flexibility - manual therapy, therapeutic exercise, therapeutic activity and home program  Decreased joint mobility - manual therapy, therapeutic exercise, therapeutic activity and home program    Previous and current functional limitations:  (See Goal Flow Sheet for this information)    Short term and Long term goals: (See Goal Flow Sheet for this information)     Communication ability:  Patient appears to be able to clearly communicate and understand verbal and written communication and follow directions correctly.  Treatment Explanation - The following has been discussed with the patient:   RX  ordered/plan of care  Anticipated outcomes  Possible risks and side effects  This patient would benefit from PT intervention to resume normal activities.   Rehab potential is good.    Frequency:  2 X week, once daily  Duration:  for 4 weeks, 8 total visits.  Discharge Plan:  Achieve all LTG.  Independent in home treatment program.  Reach maximal therapeutic benefit.    Please refer to the daily flowsheet for treatment today, total treatment time and time spent performing 1:1 timed codes.

## 2019-07-26 NOTE — LETTER
[facility]  [address]  [place] [address]  [phone]    2019    Re: Grabiel Cotto   :   1970  MRN:  [id number]   REFERRING PHYSICIAN:   [facility]    Date of Initial Evaluation:  19  Visits:  Rxs Used: 1  Reason for Referral:  Shoulder pain, right    [facility] Initial Evaluation    Subjective:  Type of problem:  Right shoulder   Condition occurred with:  Repetition/overuse. This is a new condition   Problem details: Pt had capsular release on 19 by Dr. Dallas.   Patient reports pain:  Anterior and lateral. Radiates to:  Upper arm. Associated symptoms:  Loss of motion/stiffness. Symptoms are exacerbated by certain positions and using arm behind back and relieved by ice and activity/movement.    Objective:    SHOULDER:  Cervical Screen: normal and painfree    Shoulder:   PROM L PROM R AROM L AROM R MMT L MMT R   Flex   178 160 5/5 4/5   Abd   178 151 5/5 4/5   Full Can         Empty Can         IR   T6 T10 with effort and pain 5/5 5-/5   ER   80 66 5/5 4/5   Ext/IR           Scapulothoraic Rhythm: early and excessive R upper trap compensation during scaption    Palpation: no TTP    Special tests: deferred due to known surgery    GH Mobility  L  R   Posterior glide wnl limited   Inferior glide wnl wnl   Anterior glide wnl wnl     Assessment/Plan:    Patient is a 49 year old male with right side shoulder complaints.    Patient has the following significant findings with corresponding treatment plan.                Diagnosis 1:  S/p R capsular release  Decreased ROM/flexibility - manual therapy, therapeutic exercise, therapeutic activity and home program  Decreased joint mobility - manual therapy, therapeutic exercise, therapeutic activity and home program  Previous and current functional limitations:  (See Goal Flow Sheet for this information)    Short term and Long term goals: (See Goal Flow  Sheet for this information)   Communication ability:  Patient appears to be able to clearly communicate and understand verbal and written communication and follow directions correctly.  Treatment Explanation - The following has been discussed with the patient:   RX ordered/plan of care  Anticipated outcomes  Possible risks and side effects  This patient would benefit from PT intervention to resume normal activities.   Rehab potential is good.    Frequency:  2 X week, once daily  Duration:  for 4 weeks, 8 total visits.  Discharge Plan:  Achieve all LTG.  Independent in home treatment program.  Reach maximal therapeutic benefit.    Thank you for your referral.    INQUIRIES  Therapist: [facility]  [address] [place] [address]  Phone: [phone]  Fax: [phone]

## 2019-08-02 ENCOUNTER — THERAPY VISIT (OUTPATIENT)
Dept: PHYSICAL THERAPY | Facility: CLINIC | Age: 49
End: 2019-08-02
Payer: OTHER MISCELLANEOUS

## 2019-08-02 DIAGNOSIS — M25.511 SHOULDER PAIN, RIGHT: Primary | ICD-10-CM

## 2019-08-02 PROCEDURE — 97110 THERAPEUTIC EXERCISES: CPT | Mod: GP | Performed by: PHYSICAL THERAPIST

## 2019-08-05 ENCOUNTER — THERAPY VISIT (OUTPATIENT)
Dept: PHYSICAL THERAPY | Facility: CLINIC | Age: 49
End: 2019-08-05
Payer: OTHER MISCELLANEOUS

## 2019-08-05 DIAGNOSIS — G89.18 ACUTE POSTOPERATIVE PAIN OF RIGHT SHOULDER: Primary | ICD-10-CM

## 2019-08-05 DIAGNOSIS — M25.511 ACUTE POSTOPERATIVE PAIN OF RIGHT SHOULDER: Primary | ICD-10-CM

## 2019-08-05 PROCEDURE — 97110 THERAPEUTIC EXERCISES: CPT | Mod: GP | Performed by: PHYSICAL THERAPIST

## 2019-08-05 PROCEDURE — 97112 NEUROMUSCULAR REEDUCATION: CPT | Mod: GP | Performed by: PHYSICAL THERAPIST

## 2019-08-09 ENCOUNTER — THERAPY VISIT (OUTPATIENT)
Dept: PHYSICAL THERAPY | Facility: CLINIC | Age: 49
End: 2019-08-09
Payer: OTHER MISCELLANEOUS

## 2019-08-09 DIAGNOSIS — M25.511 SHOULDER PAIN, RIGHT: Primary | ICD-10-CM

## 2019-08-09 PROCEDURE — 97112 NEUROMUSCULAR REEDUCATION: CPT | Mod: GP | Performed by: PHYSICAL THERAPIST

## 2019-08-09 PROCEDURE — 97110 THERAPEUTIC EXERCISES: CPT | Mod: GP | Performed by: PHYSICAL THERAPIST

## 2019-08-12 ENCOUNTER — THERAPY VISIT (OUTPATIENT)
Dept: PHYSICAL THERAPY | Facility: CLINIC | Age: 49
End: 2019-08-12
Payer: OTHER MISCELLANEOUS

## 2019-08-12 DIAGNOSIS — M25.511 SHOULDER PAIN, RIGHT: Primary | ICD-10-CM

## 2019-08-12 PROCEDURE — 97110 THERAPEUTIC EXERCISES: CPT | Mod: GP | Performed by: PHYSICAL THERAPIST

## 2019-08-12 PROCEDURE — 97112 NEUROMUSCULAR REEDUCATION: CPT | Mod: GP | Performed by: PHYSICAL THERAPIST

## 2019-08-15 ENCOUNTER — THERAPY VISIT (OUTPATIENT)
Dept: PHYSICAL THERAPY | Facility: CLINIC | Age: 49
End: 2019-08-15
Payer: OTHER MISCELLANEOUS

## 2019-08-15 DIAGNOSIS — M25.511 SHOULDER PAIN, RIGHT: Primary | ICD-10-CM

## 2019-08-15 PROCEDURE — 97112 NEUROMUSCULAR REEDUCATION: CPT | Mod: GP | Performed by: PHYSICAL THERAPIST

## 2019-08-15 PROCEDURE — 97110 THERAPEUTIC EXERCISES: CPT | Mod: GP | Performed by: PHYSICAL THERAPIST

## 2019-08-19 ENCOUNTER — THERAPY VISIT (OUTPATIENT)
Dept: PHYSICAL THERAPY | Facility: CLINIC | Age: 49
End: 2019-08-19
Payer: OTHER MISCELLANEOUS

## 2019-08-19 DIAGNOSIS — M25.511 ACUTE POSTOPERATIVE PAIN OF RIGHT SHOULDER: Primary | ICD-10-CM

## 2019-08-19 DIAGNOSIS — G89.18 ACUTE POSTOPERATIVE PAIN OF RIGHT SHOULDER: Primary | ICD-10-CM

## 2019-08-19 PROCEDURE — 97112 NEUROMUSCULAR REEDUCATION: CPT | Mod: GP | Performed by: PHYSICAL THERAPIST

## 2019-08-19 PROCEDURE — 97110 THERAPEUTIC EXERCISES: CPT | Mod: GP | Performed by: PHYSICAL THERAPIST

## 2019-08-22 ENCOUNTER — THERAPY VISIT (OUTPATIENT)
Dept: PHYSICAL THERAPY | Facility: CLINIC | Age: 49
End: 2019-08-22
Payer: OTHER MISCELLANEOUS

## 2019-08-22 DIAGNOSIS — M25.511 SHOULDER PAIN, RIGHT: Primary | ICD-10-CM

## 2019-08-22 PROCEDURE — 97110 THERAPEUTIC EXERCISES: CPT | Mod: GP | Performed by: PHYSICAL THERAPIST

## 2019-08-22 PROCEDURE — 97112 NEUROMUSCULAR REEDUCATION: CPT | Mod: GP | Performed by: PHYSICAL THERAPIST

## 2019-08-22 NOTE — LETTER
"[facility]  [address]  [place] [address]  [phone]    2019    Re: Grabiel Cotto   :   1970  MRN:  [id number]   REFERRING PHYSICIAN:   Naveen Dallas    [facility]    Date of Initial Evaluation:  19  Visits:  Rxs Used: 8  Reason for Referral:  Shoulder pain, right    PROGRESS  REPORT  Progress reporting period is from 19 to 19.     SUBJECTIVE  Subjective: pt reports slight irritation today in both arms after doing some lifting yesterday but overall slowly improving. admits his R arm is still stronger is L arm and R arm is \"the best it has been.\"  Current Pain level: 0/10   Initial Pain level: 2/10   The objective findings are from DOS 19.    Shoulder Pain and Disability Index(SPADI): 16 (improved from 20 on 19)    OBJECTIVE  Objective: painfree R against gravity AROM flx 90%, abd 95%, %, IR 85-90%. supine AROM full and equal.      ASSESSMENT/PLAN  Updated problem list and treatment plan: Diagnosis 1:  S/p SAD and capsular release  STG/LTGs have been met or progress has been made towards goals:  Yes, progressing towards painfree full ROM.  Assessment of Progress: The patient's condition is improving.  The patient's condition has potential to improve.  Self Management Plans:  Patient has been instructed in a home treatment program.  I have re-evaluated this patient and find that the nature, scope, duration and intensity of the therapy is appropriate for the medical condition of the patient.  The patient is returning to your office for a recheck appointment.    Recommendations:  Nish is doing well, has >90% against gravity AROM R shoulder vs L and admits his R arm is stronger than his L arm. He has been seen 8 times over the past 4 weeks with primary focus on ROM with some strengthening for HEP and if further PT is recommended it will focus on general shoulder, rotator cuff and periscapular strengthening.    Thank you for your " referral.    INQUIRIES  Therapist: [facility]  [address]  [place] [address]  Phone: [phone]  Fax: [phone]

## 2019-08-22 NOTE — PROGRESS NOTES
"Subjective:  HPI                    Objective:  System    Physical Exam    General     ROS    Assessment/Plan:    PROGRESS  REPORT    Progress reporting period is from 7/26/19 to 8/22/19.     SUBJECTIVE  Subjective: pt reports slight irritation today in both arms after doing some lifting yesterday but overall slowly improving. admits his R arm is still stronger is L arm and R arm is \"the best it has been.\"  Current Pain level: 0/10   Initial Pain level: 2/10   The objective findings are from DOS 8/22/19.    Shoulder Pain and Disability Index(SPADI): 16 (improved from 20 on 7/26/19)    OBJECTIVE  Objective: painfree R against gravity AROM flx 90%, abd 95%, %, IR 85-90%. supine AROM full and equal.      ASSESSMENT/PLAN  Updated problem list and treatment plan: Diagnosis 1:  S/p SAD and capsular release  STG/LTGs have been met or progress has been made towards goals:  Yes, progressing towards painfree full ROM.  Assessment of Progress: The patient's condition is improving.  The patient's condition has potential to improve.  Self Management Plans:  Patient has been instructed in a home treatment program.  I have re-evaluated this patient and find that the nature, scope, duration and intensity of the therapy is appropriate for the medical condition of the patient.  The patient is returning to your office for a recheck appointment.    Recommendations:  Nish is doing well, has >90% against gravity AROM R shoulder vs L and admits his R arm is stronger than his L arm. He has been seen 8 times over the past 4 weeks with primary focus on ROM with some strengthening for HEP and if further PT is recommended it will focus on general shoulder, rotator cuff and periscapular strengthening.    Please refer to the daily flowsheet for treatment today, total treatment time and time spent performing 1:1 timed codes.    "

## 2019-10-09 ENCOUNTER — TELEPHONE (OUTPATIENT)
Dept: OTHER | Facility: CLINIC | Age: 49
End: 2019-10-09

## 2020-07-07 NOTE — PROGRESS NOTES
"Subjective     Grabiel Cotto is a 50 year old male who presents to clinic today for the following health issues:    HPI   Patient here with multiple concerns:  Colon cancer is due per pt.    1)  Black stools: Per pt been having dark/blood in stool x 2-3 weeks now and not all the time on and off . Sometimes oily.  Drinks alcohol on weekends.  Is not sure how many drinks he has at this time drinks bourbon and coke. Later in discussion it sounds like 5 is often the case in one sitting. He states he \"pays for it \" the next morning by having diarrhea. Doesn't have vomiting or other \"hungover\" symptoms per patient.     No pepto bismol use. Took a tums yesterday.     Does get heartburn after 4-5 alcoholic drink. Pizza sauce also makes it worse.     He has soft stools, poops daily. Denies constipation.   Never got colonscopy or fit card.  We talked about previously but he declined. He now realizes he probably should get one. No fevers or weight loss or night sweats. Parent with leukemia, he does request labs today. No change in fatigue.   Doesn't take anything daily for medication. Took tums only once.   Has had some bright red blood after eating spicy foods per patient.   Has had hemorrhoid before per patient.       2)  ?  Hernia: L side abdominal area looks bigger than the R per pt, feels like it could be a hernia??  No lump just entire left side of abdomen looks bigger to him per patient.     Pain: sometimes on left side but not severe.     Worse with straining? No just always there    Is obese bmi 34.               Patient Active Problem List   Diagnosis     Right shoulder pain     Supraspinatus tendon tear, right, initial encounter     Impingement syndrome of right shoulder     AC (acromioclavicular) joint arthritis     Complete tear of right rotator cuff     S/P right rotator cuff repair     Past Surgical History:   Procedure Laterality Date     SHOULDER SURGERY      R shoulder surgery       Social History " "    Tobacco Use     Smoking status: Former Smoker     Last attempt to quit: 10/23/2015     Years since quittin.7     Smokeless tobacco: Never Used   Substance Use Topics     Alcohol use: Yes     Comment: occ/SOC     Family History   Problem Relation Age of Onset     Cancer Father      Dementia Maternal Grandfather      Cancer Maternal Grandfather      Cancer Paternal Grandfather          No current outpatient medications on file.     No Known Allergies      Reviewed and updated as needed this visit by Provider         Review of Systems   Constitutional, HEENT, cardiovascular, pulmonary, GI, , musculoskeletal, neuro, skin, endocrine and psych systems are negative, except as otherwise noted.      Objective    /84   Pulse 79   Temp 96.3  F (35.7  C) (Tympanic)   Resp 14   Ht 1.88 m (6' 2\")   Wt 121.1 kg (267 lb)   SpO2 97%   BMI 34.28 kg/m    Body mass index is 34.28 kg/m .  Physical Exam   GENERAL: alert, no distress and obese  NECK: no adenopathy, no asymmetry, masses, or scars and thyroid normal to palpation  RESP: lungs clear to auscultation - no rales, rhonchi or wheezes  CV: regular rate and rhythm, normal S1 S2, no S3 or S4, no murmur, click or rub, no peripheral edema and peripheral pulses strong  ABDOMEN: soft, no rigidity or guarding, tender left side nonspecifically, no mass/hernia seen, no masses, HSM not noted but difficult with body habitus also  MS: no gross musculoskeletal defects noted, no edema  NEURO: Normal strength and tone, mentation intact and speech normal  PSYCH: mentation appears normal, affect normal/bright    Results for orders placed or performed in visit on 20   CBC with platelets and differential     Status: Abnormal   Result Value Ref Range    WBC 12.4 (H) 4.0 - 11.0 10e9/L    RBC Count 5.53 4.4 - 5.9 10e12/L    Hemoglobin 16.4 13.3 - 17.7 g/dL    Hematocrit 47.9 40.0 - 53.0 %    MCV 87 78 - 100 fl    MCH 29.7 26.5 - 33.0 pg    MCHC 34.2 31.5 - 36.5 g/dL    RDW " "13.7 10.0 - 15.0 %    Platelet Count 264 150 - 450 10e9/L    % Neutrophils 68.6 %    % Lymphocytes 21.0 %    % Monocytes 8.6 %    % Eosinophils 1.2 %    % Basophils 0.6 %    Absolute Neutrophil 8.5 (H) 1.6 - 8.3 10e9/L    Absolute Lymphocytes 2.6 0.8 - 5.3 10e9/L    Absolute Monocytes 1.1 0.0 - 1.3 10e9/L    Absolute Eosinophils 0.2 0.0 - 0.7 10e9/L    Absolute Basophils 0.1 0.0 - 0.2 10e9/L    Diff Method Automated Method              Assessment & Plan     1. Screen for colon cancer    - GASTROENTEROLOGY ADULT REF PROCEDURE ONLY; Future  - CBC with platelets and differential    2. Dark stools  Concern for upper gastroenterology bleed, but has had some BRBPR also so needs to get both upper and lower scope. Overdue for colonoscopy so could be cancer, colitis, or bleeding ulcer, or other such as IBD.   Hg is normal so no anemia from this which is a good sign. Wbc mildly elevated, will repeat in 1 month to make sure normalizes. Could be if he is having colitis.   - GASTROENTEROLOGY ADULT REF PROCEDURE ONLY; Future    3. Screening for diabetes mellitus    - Comprehensive metabolic panel (BMP + Alb, Alk Phos, ALT, AST, Total. Bili, TP)    4. Lipid screening    - Lipid panel reflex to direct LDL Fasting     BMI:   Estimated body mass index is 34.28 kg/m  as calculated from the following:    Height as of this encounter: 1.88 m (6' 2\").    Weight as of this encounter: 121.1 kg (267 lb).   Weight management plan: Discussed healthy diet and exercise guidelines            Cheryle Finch PA-C  [facility]          "

## 2020-07-13 ENCOUNTER — OFFICE VISIT (OUTPATIENT)
Dept: FAMILY MEDICINE | Facility: CLINIC | Age: 50
End: 2020-07-13
Payer: COMMERCIAL

## 2020-07-13 VITALS
TEMPERATURE: 96.3 F | WEIGHT: 267 LBS | BODY MASS INDEX: 34.27 KG/M2 | HEART RATE: 79 BPM | HEIGHT: 74 IN | SYSTOLIC BLOOD PRESSURE: 130 MMHG | RESPIRATION RATE: 14 BRPM | OXYGEN SATURATION: 97 % | DIASTOLIC BLOOD PRESSURE: 84 MMHG

## 2020-07-13 DIAGNOSIS — Z12.11 SCREEN FOR COLON CANCER: ICD-10-CM

## 2020-07-13 DIAGNOSIS — Z13.1 SCREENING FOR DIABETES MELLITUS: ICD-10-CM

## 2020-07-13 DIAGNOSIS — Z13.220 LIPID SCREENING: ICD-10-CM

## 2020-07-13 DIAGNOSIS — R19.5 DARK STOOLS: Primary | ICD-10-CM

## 2020-07-13 DIAGNOSIS — D72.829 LEUKOCYTOSIS, UNSPECIFIED TYPE: ICD-10-CM

## 2020-07-13 LAB
ALBUMIN SERPL-MCNC: 3.9 G/DL (ref 3.4–5)
ALP SERPL-CCNC: 64 U/L (ref 40–150)
ALT SERPL W P-5'-P-CCNC: 26 U/L (ref 0–70)
ANION GAP SERPL CALCULATED.3IONS-SCNC: 4 MMOL/L (ref 3–14)
AST SERPL W P-5'-P-CCNC: 16 U/L (ref 0–45)
BASOPHILS # BLD AUTO: 0.1 10E9/L (ref 0–0.2)
BASOPHILS NFR BLD AUTO: 0.6 %
BILIRUB SERPL-MCNC: 0.6 MG/DL (ref 0.2–1.3)
BUN SERPL-MCNC: 11 MG/DL (ref 7–30)
CALCIUM SERPL-MCNC: 8.6 MG/DL (ref 8.5–10.1)
CHLORIDE SERPL-SCNC: 108 MMOL/L (ref 94–109)
CHOLEST SERPL-MCNC: 228 MG/DL
CO2 SERPL-SCNC: 26 MMOL/L (ref 20–32)
CREAT SERPL-MCNC: 1.01 MG/DL (ref 0.66–1.25)
DIFFERENTIAL METHOD BLD: ABNORMAL
EOSINOPHIL # BLD AUTO: 0.2 10E9/L (ref 0–0.7)
EOSINOPHIL NFR BLD AUTO: 1.2 %
ERYTHROCYTE [DISTWIDTH] IN BLOOD BY AUTOMATED COUNT: 13.7 % (ref 10–15)
GFR SERPL CREATININE-BSD FRML MDRD: 85 ML/MIN/{1.73_M2}
GLUCOSE SERPL-MCNC: 99 MG/DL (ref 70–99)
HCT VFR BLD AUTO: 47.9 % (ref 40–53)
HDLC SERPL-MCNC: 35 MG/DL
HGB BLD-MCNC: 16.4 G/DL (ref 13.3–17.7)
LDLC SERPL CALC-MCNC: 136 MG/DL
LYMPHOCYTES # BLD AUTO: 2.6 10E9/L (ref 0.8–5.3)
LYMPHOCYTES NFR BLD AUTO: 21 %
MCH RBC QN AUTO: 29.7 PG (ref 26.5–33)
MCHC RBC AUTO-ENTMCNC: 34.2 G/DL (ref 31.5–36.5)
MCV RBC AUTO: 87 FL (ref 78–100)
MONOCYTES # BLD AUTO: 1.1 10E9/L (ref 0–1.3)
MONOCYTES NFR BLD AUTO: 8.6 %
NEUTROPHILS # BLD AUTO: 8.5 10E9/L (ref 1.6–8.3)
NEUTROPHILS NFR BLD AUTO: 68.6 %
NONHDLC SERPL-MCNC: 193 MG/DL
PLATELET # BLD AUTO: 264 10E9/L (ref 150–450)
POTASSIUM SERPL-SCNC: 4.4 MMOL/L (ref 3.4–5.3)
PROT SERPL-MCNC: 6.9 G/DL (ref 6.8–8.8)
RBC # BLD AUTO: 5.53 10E12/L (ref 4.4–5.9)
SODIUM SERPL-SCNC: 138 MMOL/L (ref 133–144)
TRIGL SERPL-MCNC: 284 MG/DL
WBC # BLD AUTO: 12.4 10E9/L (ref 4–11)

## 2020-07-13 PROCEDURE — 80061 LIPID PANEL: CPT | Performed by: PHYSICIAN ASSISTANT

## 2020-07-13 PROCEDURE — 99214 OFFICE O/P EST MOD 30 MIN: CPT | Performed by: PHYSICIAN ASSISTANT

## 2020-07-13 PROCEDURE — 80053 COMPREHEN METABOLIC PANEL: CPT | Performed by: PHYSICIAN ASSISTANT

## 2020-07-13 PROCEDURE — 36415 COLL VENOUS BLD VENIPUNCTURE: CPT | Performed by: PHYSICIAN ASSISTANT

## 2020-07-13 PROCEDURE — 85025 COMPLETE CBC W/AUTO DIFF WBC: CPT | Performed by: PHYSICIAN ASSISTANT

## 2020-07-13 ASSESSMENT — MIFFLIN-ST. JEOR: SCORE: 2130.85

## 2020-07-14 ENCOUNTER — TELEPHONE (OUTPATIENT)
Dept: FAMILY MEDICINE | Facility: CLINIC | Age: 50
End: 2020-07-14

## 2020-07-14 RX ORDER — OMEGA-3S/DHA/EPA/FISH OIL/D3 300MG-1000
1000 CAPSULE ORAL DAILY
COMMUNITY
Start: 2020-07-14 | End: 2022-10-27

## 2020-07-14 NOTE — RESULT ENCOUNTER NOTE
"PLEASE CALL PATIENT:  Dear Grabiel,      It was a pleasure to see you at your recent office visit.  Your test results are listed below. Your LDL or \"bad\" cholesterol was elevated some at 136.  Your HDL or \"good\" cholesterol was low at 35, the higher this number the better.  Your triglycerides or \"fatty acids\" were elevated at 284.  To improve this,  I would recommend exercising for at least 30 minutes 5 times a week or for 50 minutes 3 times a week.  Brisk walking counts for this, it does not need to be vigorous exercise.  Also a diet high in vegetables, fruits, fiber, and whole grains will help.  If you are not currently taking a fish oil supplement (which is over-the-counter) start 1 gram fish oil supplement daily.    We will re-check your cholesterol in 12 months to assess your progress.    Sodium and potassium normal. Blood sugar (glucose) normal.  Creatinine and GFR normal, which means kidney function is normal.   Liver tests normal. White blood cell count just slightly (barely) elevated. I would like to recheck this in 1 months time as it is likely a variant of normal for you. Hemoglobin in normal so no anemia (loss of too much blood) so that is good.             If you have any questions or concerns, please call the clinic at [phone].    Sincerely,  Cheryle Finch PA-C    "

## 2020-07-14 NOTE — TELEPHONE ENCOUNTER
"----- Message from Cheryle Finch PA-C sent at 7/14/2020  3:07 PM CDT -----  PLEASE CALL PATIENT:  Dear Grabiel,      It was a pleasure to see you at your recent office visit.  Your test results are listed below. Your LDL or \"bad\" cholesterol was elevated some at 136.  Your HDL or \"good\" cholesterol was low at 35, the higher this number the better.  Your triglycerides or \"fatty acids\" were elevated at 284.  To improve this,  I would recommend exercising for at least 30 minutes 5 times a week or for 50 minutes 3 times a week.  Brisk walking counts for this, it does not need to be vigorous exercise.  Also a diet high in vegetables, fruits, fiber, and whole grains will help.  If you are not currently taking a fish oil supplement (which is over-the-counter) start 1 gram fish oil supplement daily.    We will re-check your cholesterol in 12 months to assess your progress.    Sodium and potassium normal. Blood sugar (glucose) normal.  Creatinine and GFR normal, which means kidney function is normal.   Liver tests normal. White blood cell count just slightly (barely) elevated. I would like to recheck this in 1 months time as it is likely a variant of normal for you. Hemoglobin in normal so no anemia (loss of too much blood) so that is good.             If you have any questions or concerns, please call the clinic at [phone].    Sincerely,  Cheryle Finch PA-C      "

## 2020-07-14 NOTE — TELEPHONE ENCOUNTER
Pt notified via phone of results and plan per provider note below as written. Pt indicates understanding of issues and agrees with the plan. Pt states he plans to try fish oil as recommended and this was added historically to medication list by RN due to pt agreement to start this supplement. Pt will call to schedule a lab-only appointment in a month as recommended. Pt states he is having trouble accessing his [employer] account, so Helpdesk number provided to pt.    ISABELL LopezN, RN

## 2020-08-03 ENCOUNTER — HOSPITAL ENCOUNTER (OUTPATIENT)
Facility: AMBULATORY SURGERY CENTER | Age: 50
End: 2020-08-03
Attending: SURGERY | Admitting: SURGERY
Payer: COMMERCIAL

## 2020-08-03 DIAGNOSIS — Z11.59 ENCOUNTER FOR SCREENING FOR OTHER VIRAL DISEASES: Primary | ICD-10-CM

## 2020-08-03 DIAGNOSIS — Z12.11 SCREENING FOR COLON CANCER: Primary | ICD-10-CM

## 2020-08-06 RX ORDER — SODIUM, POTASSIUM,MAG SULFATES 17.5-3.13G
1 SOLUTION, RECONSTITUTED, ORAL ORAL SEE ADMIN INSTRUCTIONS
Qty: 2 BOTTLE | Refills: 0 | Status: SHIPPED | OUTPATIENT
Start: 2020-08-06 | End: 2020-08-12 | Stop reason: HOSPADM

## 2020-08-06 RX ORDER — BISACODYL 5 MG/1
5 TABLET, DELAYED RELEASE ORAL SEE ADMIN INSTRUCTIONS
Qty: 1 TABLET | Refills: 0 | Status: SHIPPED | OUTPATIENT
Start: 2020-08-06 | End: 2020-08-12 | Stop reason: HOSPADM

## 2020-08-07 RX ORDER — ONDANSETRON 2 MG/ML
4 INJECTION INTRAMUSCULAR; INTRAVENOUS
Status: CANCELLED | OUTPATIENT
Start: 2020-08-07

## 2020-08-07 RX ORDER — LIDOCAINE 40 MG/G
CREAM TOPICAL
Status: CANCELLED | OUTPATIENT
Start: 2020-08-07

## 2020-08-10 DIAGNOSIS — Z11.59 ENCOUNTER FOR SCREENING FOR OTHER VIRAL DISEASES: ICD-10-CM

## 2020-08-10 PROCEDURE — U0003 INFECTIOUS AGENT DETECTION BY NUCLEIC ACID (DNA OR RNA); SEVERE ACUTE RESPIRATORY SYNDROME CORONAVIRUS 2 (SARS-COV-2) (CORONAVIRUS DISEASE [COVID-19]), AMPLIFIED PROBE TECHNIQUE, MAKING USE OF HIGH THROUGHPUT TECHNOLOGIES AS DESCRIBED BY CMS-2020-01-R: HCPCS | Performed by: SURGERY

## 2020-08-11 LAB
SARS-COV-2 RNA SPEC QL NAA+PROBE: ABNORMAL
SPECIMEN SOURCE: ABNORMAL

## 2020-08-12 ENCOUNTER — E-VISIT (OUTPATIENT)
Dept: FAMILY MEDICINE | Facility: CLINIC | Age: 50
End: 2020-08-12
Payer: COMMERCIAL

## 2020-08-12 ENCOUNTER — TELEPHONE (OUTPATIENT)
Dept: EMERGENCY MEDICINE | Facility: CLINIC | Age: 50
End: 2020-08-12

## 2020-08-12 DIAGNOSIS — U07.1 2019 NOVEL CORONAVIRUS DISEASE (COVID-19): Primary | ICD-10-CM

## 2020-08-12 PROCEDURE — 99207 ZZC NON-BILLABLE SERV PER CHARTING: CPT | Performed by: PHYSICIAN ASSISTANT

## 2020-08-12 NOTE — TELEPHONE ENCOUNTER
"Coronavirus (COVID-19) Notification    Caller Name (Patient, parent, daughter/son, grandparent, etc)  Patient     Reason for call  Notify of Positive Coronavirus (COVID-19) lab results, assess symptoms,  review [facility] recommendations    Lab Result    Lab test:  2019-nCoV rRt-PCR or SARS-CoV-2 PCR    Oropharyngeal AND/OR nasopharyngeal swabs is POSITIVE for 2019-nCoV RNA/SARS-COV-2 PCR (COVID-19 virus)    RN Recommendations/Instructions per [facility] Coronavirus COVID-19 recommendations    Brief introduction script  Introduce self then review script:  \"I am calling on behalf of [employer].  We were notified that your Coronavirus test (COVID-19) for was POSITIVE for the virus.  I have some information to relay to you but first I wanted to mention that the [place] Dept of Health will be contacting you shortly [it's possible MD already called Patient] to talk to you more about how you are feeling and other people you have had contact with who might now also have the virus.  Also, [facility] is Partnering with the [facility] for Covid-19 research, you may be contacted directly by research staff.\"    Assessment (Inquire about Patient's current symptoms)   Assessment   Current Symptoms at time of phone call: (if no symptoms, document No symptoms]  sore throat, headache   Symptoms onset (if applicable)  8/10/2020     If at time of call, Patients symptoms hare worsened, the Patient should contact 911 or have someone drive them to Emergency Dept promptly:      If Patient calling 911, inform 911 personal that you have tested positive for the Coronavirus (COVID-19).  Place mask on and await 911 to arrive.    If Emergency Dept, If possible, please have another adult drive you to the Emergency Dept but you need to wear mask when in contact with other people.      Review information with Patient    Your result was positive. This means you have COVID-19 (coronavirus).  We have sent you a letter " that reviews the information that I'll be reviewing with you now.    How can I protect others?    If you have symptoms: stay home and away from others (self-isolate) until:    You've had no fever--and no medicine that reduces fever--for 3 full days (72 hours). And      Your other symptoms have gotten better. For example, your cough or breathing has improved. And     At least 10 days have passed since your symptoms started.    If you don't have symptoms: Stay home and away from others (self-isolate) until at least 10 days have passed since your first positive COVID-19 test. (Date test collected)    During this time:    Stay in your own room, including for meals. Use your own bathroom if you can.    Stay away from others in your home. No hugging, kissing or shaking hands. No visitors.     Don't go to work, school or anywhere else.     Clean  high touch  surfaces often (doorknobs, counters, handles, etc.). Use a household cleaning spray or wipes. You'll find a full list on the EPA website at www.epa.gov/pesticide-registration/list-n-disinfectants-use-against-sars-cov-2.     Cover your mouth and nose with a mask, tissue or washcloth to avoid spreading germs.    Wash your hands and face often with soap and water.    Caregivers in these groups are at risk for severe illness due to COVID-19:  o People 65 years and older  o People who live in a nursing home or long-term care facility  o People with chronic disease (lung, heart, cancer, diabetes, kidney, liver, immunologic)  o People who have a weakened immune system, including those who:  - Are in cancer treatment  - Take medicine that weakens the immune system, such as corticosteroids  - Had a bone marrow or organ transplant  - Have an immune deficiency  - Have poorly controlled HIV or AIDS  - Are obese (body mass index of 40 or higher)  - Smoke regularly    Caregivers should wear gloves while washing dishes, handling laundry and cleaning bedrooms and bathrooms.    Wash  and dry laundry with special caution. Don't shake dirty laundry, and use the warmest water setting you can.    If you have a weakened immune system, ask your doctor about other actions you should take.    For more tips, go to www.cdc.gov/coronavirus/2019-ncov/downloads/10Things.pdf.    You should not go back to work until you meet the guidelines above for ending your home isolation. You should meet these along with any other guidelines that your employer has.    Employers: This document serves as formal notice of your employee's medical guidelines for going back to work. They must meet the above guidelines before going back to work in person.    How can I take care of myself?    1. Get lots of rest. Drink extra fluids (unless a doctor has told you not to).    2. Take Tylenol (acetaminophen) for fever or pain. If you have liver or kidney problems, ask your family doctor if it's okay to take Tylenol.     Take either:     650 mg (two 325 mg pills) every 4 to 6 hours, or     1,000 mg (two 500 mg pills) every 8 hours as needed.     Note: Don't take more than 3,000 mg in one day. Acetaminophen is found in many medicines (both prescribed and over-the-counter medicines). Read all labels to be sure you don't take too much.    For children, check the Tylenol bottle for the right dose (based on their age or weight).    3. If you have other health problems (like cancer, heart failure, an organ transplant or severe kidney disease): Call your specialty clinic if you don't feel better in the next 2 days.    4. Know when to call 911: Emergency warning signs include:    Trouble breathing or shortness of breath    Pain or pressure in the chest that doesn't go away    Feeling confused like you haven't felt before, or not being able to wake up    Bluish-colored lips or face    5. Sign up for [employer]. We know it's scary to hear that you have COVID-19. We want to track your symptoms to make sure you're okay over the next 2 weeks.  Please look for an email from "[employer]--this is a free, online program that we'll use to keep in touch. To sign up, follow the link in the email. Learn more at [email or web address].    Where can I get more information?    [facility]: [email or web address]    Coronavirus Basics: www.health.[facility]./diseases/coronavirus/basics.html    What to Do If You're Sick: www.cdc.gov/coronavirus/2019-ncov/about/steps-when-sick.html    Ending Home Isolation: www.cdc.gov/coronavirus/2019-ncov/hcp/disposition-in-home-patients.html     Caring for Someone with COVID-19: www.cdc.gov/coronavirus/2019-ncov/if-you-are-sick/care-for-someone.html     [facility] clinical trials (COVID-19 research studies): clinicalaffairs.[facility]-clinical-trials     A Positive COVID-19 letter will be sent via [employer] or the mail.    [Name]  Luis Hatfield RN  [facility]  COVID19 Results Team RN  Ph# [phone]

## 2020-08-13 PROBLEM — U07.1 2019 NOVEL CORONAVIRUS DISEASE (COVID-19): Status: ACTIVE | Noted: 2020-08-13

## 2020-08-13 NOTE — PATIENT INSTRUCTIONS
Thank you for limiting contact with others, wearing a simple mask to cover your cough, practice good hand hygiene habits and accessing our virtual services where possible to limit the spread of this virus.    For more information about COVID19 and options for caring for yourself at home, please visit the CDC website at https://www.cdc.gov/coronavirus/2019-ncov/about/steps-when-sick.html  For more options for care at [facility], please visit our website at [email or web address]

## 2020-08-28 ENCOUNTER — TELEPHONE (OUTPATIENT)
Dept: FAMILY MEDICINE | Facility: CLINIC | Age: 50
End: 2020-08-28

## 2020-08-28 DIAGNOSIS — Z11.59 ENCOUNTER FOR SCREENING FOR OTHER VIRAL DISEASES: Primary | ICD-10-CM

## 2020-08-28 NOTE — TELEPHONE ENCOUNTER
Reason for call:  Other   Patient called regarding (reason for call): call back  Additional comments: [facility] [place] is calling to get updated order for a patient. He needs an endoscopy orders placed. Please add.    Phone number to reach patient:  Other phone number:    Best Time:  NO Number    Can we leave a detailed message on this number?  NO    Travel screening: Not Applicable

## 2020-09-18 RX ORDER — SODIUM, POTASSIUM,MAG SULFATES 17.5-3.13G
1 SOLUTION, RECONSTITUTED, ORAL ORAL SEE ADMIN INSTRUCTIONS
Qty: 2 BOTTLE | Refills: 0 | Status: SHIPPED | OUTPATIENT
Start: 2020-09-18 | End: 2022-10-27

## 2020-09-18 RX ORDER — BISACODYL 5 MG/1
5 TABLET, DELAYED RELEASE ORAL SEE ADMIN INSTRUCTIONS
Qty: 1 TABLET | Refills: 0 | Status: SHIPPED | OUTPATIENT
Start: 2020-09-18 | End: 2022-10-27

## 2020-09-22 DIAGNOSIS — Z11.59 ENCOUNTER FOR SCREENING FOR OTHER VIRAL DISEASES: ICD-10-CM

## 2020-09-22 LAB
SARS-COV-2 RNA SPEC QL NAA+PROBE: NOT DETECTED
SPECIMEN SOURCE: NORMAL

## 2020-09-22 PROCEDURE — U0003 INFECTIOUS AGENT DETECTION BY NUCLEIC ACID (DNA OR RNA); SEVERE ACUTE RESPIRATORY SYNDROME CORONAVIRUS 2 (SARS-COV-2) (CORONAVIRUS DISEASE [COVID-19]), AMPLIFIED PROBE TECHNIQUE, MAKING USE OF HIGH THROUGHPUT TECHNOLOGIES AS DESCRIBED BY CMS-2020-01-R: HCPCS | Performed by: SURGERY

## 2020-09-25 ENCOUNTER — HOSPITAL ENCOUNTER (OUTPATIENT)
Facility: AMBULATORY SURGERY CENTER | Age: 50
Discharge: HOME | End: 2020-09-25
Attending: SURGERY | Admitting: SURGERY
Payer: COMMERCIAL

## 2020-09-25 VITALS
SYSTOLIC BLOOD PRESSURE: 112 MMHG | RESPIRATION RATE: 16 BRPM | HEART RATE: 78 BPM | OXYGEN SATURATION: 95 % | TEMPERATURE: 97.3 F | DIASTOLIC BLOOD PRESSURE: 70 MMHG

## 2020-09-25 DIAGNOSIS — Z12.11 SPECIAL SCREENING FOR MALIGNANT NEOPLASMS, COLON: Primary | ICD-10-CM

## 2020-09-25 LAB
COLONOSCOPY: NORMAL
UPPER GI ENDOSCOPY: NORMAL

## 2020-09-25 PROCEDURE — 99153 MOD SED SAME PHYS/QHP EA: CPT | Mod: 59 | Performed by: SURGERY

## 2020-09-25 PROCEDURE — G8918 PT W/O PREOP ORDER IV AB PRO: HCPCS

## 2020-09-25 PROCEDURE — G8907 PT DOC NO EVENTS ON DISCHARG: HCPCS

## 2020-09-25 PROCEDURE — 45385 COLONOSCOPY W/LESION REMOVAL: CPT

## 2020-09-25 PROCEDURE — 99152 MOD SED SAME PHYS/QHP 5/>YRS: CPT | Mod: 59 | Performed by: SURGERY

## 2020-09-25 PROCEDURE — 43239 EGD BIOPSY SINGLE/MULTIPLE: CPT | Mod: 51 | Performed by: SURGERY

## 2020-09-25 PROCEDURE — 88305 TISSUE EXAM BY PATHOLOGIST: CPT | Performed by: PATHOLOGY

## 2020-09-25 PROCEDURE — 45385 COLONOSCOPY W/LESION REMOVAL: CPT | Mod: PT | Performed by: SURGERY

## 2020-09-25 PROCEDURE — 43239 EGD BIOPSY SINGLE/MULTIPLE: CPT

## 2020-09-25 RX ORDER — ONDANSETRON 2 MG/ML
4 INJECTION INTRAMUSCULAR; INTRAVENOUS
Status: DISCONTINUED | OUTPATIENT
Start: 2020-09-25 | End: 2020-09-26 | Stop reason: HOSPADM

## 2020-09-25 RX ORDER — FLUMAZENIL 0.1 MG/ML
0.2 INJECTION, SOLUTION INTRAVENOUS
Status: CANCELLED | OUTPATIENT
Start: 2020-09-25 | End: 2020-09-25

## 2020-09-25 RX ORDER — NALOXONE HYDROCHLORIDE 0.4 MG/ML
.1-.4 INJECTION, SOLUTION INTRAMUSCULAR; INTRAVENOUS; SUBCUTANEOUS
Status: CANCELLED | OUTPATIENT
Start: 2020-09-25 | End: 2020-09-26

## 2020-09-25 RX ORDER — FENTANYL CITRATE 50 UG/ML
INJECTION, SOLUTION INTRAMUSCULAR; INTRAVENOUS PRN
Status: DISCONTINUED | OUTPATIENT
Start: 2020-09-25 | End: 2020-09-25 | Stop reason: HOSPADM

## 2020-09-25 RX ORDER — ONDANSETRON 4 MG/1
4 TABLET, ORALLY DISINTEGRATING ORAL EVERY 6 HOURS PRN
Status: CANCELLED | OUTPATIENT
Start: 2020-09-25

## 2020-09-25 RX ORDER — LIDOCAINE HYDROCHLORIDE 20 MG/ML
SOLUTION OROPHARYNGEAL PRN
Status: DISCONTINUED | OUTPATIENT
Start: 2020-09-25 | End: 2020-09-25 | Stop reason: HOSPADM

## 2020-09-25 RX ORDER — LIDOCAINE 40 MG/G
CREAM TOPICAL
Status: DISCONTINUED | OUTPATIENT
Start: 2020-09-25 | End: 2020-09-26 | Stop reason: HOSPADM

## 2020-09-25 RX ORDER — ONDANSETRON 2 MG/ML
4 INJECTION INTRAMUSCULAR; INTRAVENOUS EVERY 6 HOURS PRN
Status: CANCELLED | OUTPATIENT
Start: 2020-09-25

## 2020-09-30 LAB — COPATH REPORT: NORMAL

## 2020-10-05 DIAGNOSIS — K21.00 GASTROESOPHAGEAL REFLUX DISEASE WITH ESOPHAGITIS WITHOUT HEMORRHAGE: Primary | ICD-10-CM

## 2020-10-05 RX ORDER — PANTOPRAZOLE SODIUM 40 MG/1
40 TABLET, DELAYED RELEASE ORAL DAILY
Qty: 30 TABLET | Refills: 3 | Status: SHIPPED | OUTPATIENT
Start: 2020-10-05 | End: 2022-10-27

## 2020-11-20 ENCOUNTER — VIRTUAL VISIT (OUTPATIENT)
Dept: GASTROENTEROLOGY | Facility: CLINIC | Age: 50
End: 2020-11-20
Payer: COMMERCIAL

## 2020-11-20 DIAGNOSIS — D36.9 TUBULAR ADENOMA: ICD-10-CM

## 2020-11-20 DIAGNOSIS — K21.00 GASTROESOPHAGEAL REFLUX DISEASE WITH ESOPHAGITIS WITHOUT HEMORRHAGE: ICD-10-CM

## 2020-11-20 DIAGNOSIS — K64.9 HEMORRHOIDS, UNSPECIFIED HEMORRHOID TYPE: ICD-10-CM

## 2020-11-20 DIAGNOSIS — K44.9 HIATAL HERNIA: ICD-10-CM

## 2020-11-20 DIAGNOSIS — K29.70 GASTRITIS WITHOUT BLEEDING, UNSPECIFIED CHRONICITY, UNSPECIFIED GASTRITIS TYPE: Primary | ICD-10-CM

## 2020-11-20 PROCEDURE — 99204 OFFICE O/P NEW MOD 45 MIN: CPT | Mod: 95 | Performed by: INTERNAL MEDICINE

## 2020-11-20 NOTE — PROGRESS NOTES
"Grabiel Cotto is a 50 year old male who is being evaluated via a billable video visit.      The patient has been notified of following:     \"This video visit will be conducted via a call between you and your physician/provider. We have found that certain health care needs can be provided without the need for an in-person physical exam.  This service lets us provide the care you need with a video conversation.  If a prescription is necessary we can send it directly to your pharmacy.  If lab work is needed we can place an order for that and you can then stop by our lab to have the test done at a later time.    Video visits are billed at different rates depending on your insurance coverage.  Please reach out to your insurance provider with any questions.    If during the course of the call the physician/provider feels a video visit is not appropriate, you will not be charged for this service.\"    Patient has given verbal consent for Video visit? Yes  How would you like to obtain your AVS? MyChart  If you are dropped from the video visit, the video invite should be resent to: Text to cell phone: [phone]  Will anyone else be joining your video visit? No      Mae Hancock LPN            "

## 2020-11-20 NOTE — PROGRESS NOTES
HPI:    Grabiel  presents today for a video visit to discuss reflux.  Has occasional heartburn - mainly after eating spicy foods like hot sauce or salsa. No dysphagia.  No nausea or vomiting.  No issues with abdominal pain.  No weight loss.  Recently had EGD and colonoscopy done for rectal bleeding and suspected GERD (although patient unclear about this).  EGD demonstrated gastritis and duodenitis as well as a small hiatal hernia.  Biopsies of the esophagus consistent with reflux although appeared normal endoscopically. C-scope with a 10mm tubular adenoma and hemorrhoids.  Patient reports his rectal bleeding has been better lately.  Patient was recently started on protonix - hasn't noticed any difference.    Smokes 1 cigar a day.  Drinks 1 beer a day.  Drinks a lot of diet coke.            Past Medical History:   Diagnosis Date     High cholesterol      NO ACTIVE PROBLEMS        Past Surgical History:   Procedure Laterality Date     COLONOSCOPY  2020    1 polyp removed     COLONOSCOPY WITH CO2 INSUFFLATION N/A 2020    Procedure: COLONOSCOPY, WITH CO2 INSUFFLATION;  Surgeon: Kiel Velasco DO;  Location: [facility] OR     COMBINED ESOPHAGOSCOPY, GASTROSCOPY, DUODENOSCOPY (EGD) WITH CO2 INSUFFLATION N/A 2020    Procedure: ESOPHAGOGASTRODUODENOSCOPY, WITH CO2 INSUFFLATION;  Surgeon: Kiel Velasco DO;  Location: [facility] OR     ESOPHAGOSCOPY, GASTROSCOPY, DUODENOSCOPY (EGD), COMBINED N/A 2020    Procedure: Esophagogastroduodenoscopy, With Biopsy;  Surgeon: Kiel Velasco DO;  Location: [facility] OR     SHOULDER SURGERY      R shoulder surgery       Family History   Problem Relation Age of Onset     Cancer Father      Dementia Maternal Grandfather      Cancer Maternal Grandfather      Cancer Paternal Grandfather        Social History     Tobacco Use     Smoking status: Light Tobacco Smoker     Types: Cigars     Last attempt to quit: 10/23/2015     Years since quittin.0     Smokeless tobacco: Never  Used   Substance Use Topics     Alcohol use: Yes     Comment: weekends 3-4 a day     Smokes 1 cigar a day  Drinks 1 beer a day     O:    Gen: no acute distress  HEENT: NCAT  Neck: normal ROM  Resp: nonlabored breathing  Neuro: no gross deficits  Psych: appropriate mood and affect    Assessment and Plan:    # concern for reflux, gastritis, duodenitis - relatively asymptomatic from this - think it would be reasonable to monitor off medications - patient would like to finish course of protonix and then stop it.  Discussed lifestyle changes with patient like avoiding alcohol and tobacco, no carbonated beverages and weight loss.  Can use OTC antacids PRN and H2 blockers for symptom control but if requiring these frequently, would recommend daily PPI at lowest possible dose to control symptoms.    # tubular adenoma - repeat c-scope 3 years    # hematochezia - improved - likely related to hemorrhoids    RTC PRN    Renee Pina DO     Video-Visit Details     Type of service:  Video Visit     Video Start Time: 9:35 AM  Video End Time (time video stopped): 9:53 AM    Originating Location (pt. Location): home     Distant Location (provider location):  [facility]      Mode of Communication:  Video Conference via [employer]

## 2020-11-20 NOTE — PATIENT INSTRUCTIONS
Finish your protonix and then try stopping it - you can use things like pepcid (famotidine) or antacids as needed for occasional reflux - if you need to use these more than a few times a week, you may just want to resume daily protonix.     Try cutting back on carbonated beverages, tobacco and alcohol to improve your heartburn.  Avoid foods that trigger your symptoms as well.  Weight loss is also helpful at controlling reflux.   Patient Education     Tips to Control Acid Reflux    To control acid reflux, you ll need to make some basic diet and lifestyle changes. The simple steps outlined below may be all you ll need to ease discomfort.   Watch what you eat    Don't have fatty foods or spicy foods.    Eat fewer acidic foods, such as citrus and tomato-based foods. These can increase symptoms.    Limit drinking alcohol, caffeine, and fizzy beverages. All increase acid reflux.    Try limiting chocolate, peppermint, and spearmint. These can make acid reflux worse in some people.    Watch when you eat    Don't lie down for 3 hours after eating.    Don't snack before going to bed.    Raise your head  Raising your head and upper body by 4 to 6 inches helps limit reflux when you re lying down. Put blocks under the head of your bed frame or a wedge under your mattress to raise it.   Other changes    Lose weight, if you need to    Don t exercise near bedtime    Don't wear tight-fitting clothes    Limit aspirin and ibuprofen    Stop smoking    [employer]Well last reviewed this educational content on 6/1/2019 2000-2020 [employer]. [address] [place] [address], [place] [address]. All rights reserved. This information is not intended as a substitute for professional medical care. Always follow your healthcare professional's instructions.

## 2020-11-22 ENCOUNTER — HEALTH MAINTENANCE LETTER (OUTPATIENT)
Age: 50
End: 2020-11-22

## 2021-03-30 ENCOUNTER — IMMUNIZATION (OUTPATIENT)
Dept: NURSING | Facility: CLINIC | Age: 51
End: 2021-03-30
Payer: COMMERCIAL

## 2021-03-30 PROCEDURE — 91300 PR COVID VAC PFIZER DIL RECON 30 MCG/0.3 ML IM: CPT

## 2021-03-30 PROCEDURE — 0001A PR COVID VAC PFIZER DIL RECON 30 MCG/0.3 ML IM: CPT

## 2021-04-20 ENCOUNTER — IMMUNIZATION (OUTPATIENT)
Dept: NURSING | Facility: CLINIC | Age: 51
End: 2021-04-20
Attending: FAMILY MEDICINE
Payer: COMMERCIAL

## 2021-04-20 PROCEDURE — 91300 PR COVID VAC PFIZER DIL RECON 30 MCG/0.3 ML IM: CPT

## 2021-04-20 PROCEDURE — 0002A PR COVID VAC PFIZER DIL RECON 30 MCG/0.3 ML IM: CPT

## 2021-05-26 ENCOUNTER — RECORDS - HEALTHEAST (OUTPATIENT)
Dept: ADMINISTRATIVE | Facility: CLINIC | Age: 51
End: 2021-05-26

## 2021-05-27 ENCOUNTER — RECORDS - HEALTHEAST (OUTPATIENT)
Dept: ADMINISTRATIVE | Facility: CLINIC | Age: 51
End: 2021-05-27

## 2021-05-28 ENCOUNTER — RECORDS - HEALTHEAST (OUTPATIENT)
Dept: ADMINISTRATIVE | Facility: CLINIC | Age: 51
End: 2021-05-28

## 2021-05-31 ENCOUNTER — RECORDS - HEALTHEAST (OUTPATIENT)
Dept: ADMINISTRATIVE | Facility: CLINIC | Age: 51
End: 2021-05-31

## 2021-09-19 ENCOUNTER — HEALTH MAINTENANCE LETTER (OUTPATIENT)
Age: 51
End: 2021-09-19

## 2022-01-09 ENCOUNTER — HEALTH MAINTENANCE LETTER (OUTPATIENT)
Age: 52
End: 2022-01-09

## 2022-08-26 ENCOUNTER — TELEPHONE (OUTPATIENT)
Dept: FAMILY MEDICINE | Facility: CLINIC | Age: 52
End: 2022-08-26

## 2022-08-26 NOTE — TELEPHONE ENCOUNTER
Patient has severe ringing in the ears, onset the last couple of weeks and has worsened  No dizziness, headaches, or blurred vision  Appointment Monday 8/29 with NORY JorgeN, RN

## 2022-08-26 NOTE — TELEPHONE ENCOUNTER
Reason for Call:  Same Day Appointment, Requested Provider:  Anyone    PCP: Cheryle Finch    Reason for visit: ringing in his ears-patient woul dlike to be seen sooner then the first available     Duration of symptoms: over one week    Have you been treated for this in the past? No    Additional comments:     Can we leave a detailed message on this number? NO    Phone number patient can be reached at: Home number on file [phone] (home)    Best Time:     Call taken on 8/26/2022 at 11:49 AM by Sandra Lincoln

## 2022-08-29 ENCOUNTER — OFFICE VISIT (OUTPATIENT)
Dept: FAMILY MEDICINE | Facility: CLINIC | Age: 52
End: 2022-08-29
Payer: COMMERCIAL

## 2022-08-29 VITALS
SYSTOLIC BLOOD PRESSURE: 147 MMHG | OXYGEN SATURATION: 97 % | TEMPERATURE: 98.4 F | HEIGHT: 74 IN | HEART RATE: 80 BPM | BODY MASS INDEX: 33.37 KG/M2 | WEIGHT: 260 LBS | DIASTOLIC BLOOD PRESSURE: 80 MMHG

## 2022-08-29 DIAGNOSIS — H93.13 TINNITUS OF BOTH EARS: ICD-10-CM

## 2022-08-29 DIAGNOSIS — H61.91 SKIN LESION OF RIGHT EAR: Primary | ICD-10-CM

## 2022-08-29 PROCEDURE — 99213 OFFICE O/P EST LOW 20 MIN: CPT | Performed by: NURSE PRACTITIONER

## 2022-08-29 ASSESSMENT — PAIN SCALES - GENERAL: PAINLEVEL: NO PAIN (0)

## 2022-08-29 NOTE — PROGRESS NOTES
"  Assessment & Plan     Skin lesion of right ear  Referral to derm.   - Adult Dermatology Referral; Future    Tinnitus of both ears  Referral to audiology.    - Adult Audiology  Referral; Future     Nicotine/Tobacco Cessation:  He reports that he has been smoking cigars. He has never used smokeless tobacco.  Nicotine/Tobacco Cessation Plan:   Not discussed today      BMI:   Estimated body mass index is 33.38 kg/m  as calculated from the following:    Height as of this encounter: 1.88 m (6' 2\").    Weight as of this encounter: 117.9 kg (260 lb).   Weight management plan: not discussed today      No follow-ups on file.    Denice Gannon, [facility]   Grabiel is a 52 year old, presenting for the following health issues:  Ear Problem (Ringing in ears)      HPI       Ringing in both ears for the last 20 years.  Has not been evaluated before.    Getting worse.  Keeps him from sleeping.  No ear pain or dizziness.      Spot on his left ear- getting bigger.         Review of Systems   Constitutional, HEENT, cardiovascular, pulmonary, gi and gu systems are negative, except as otherwise noted.      Objective    BP (!) 147/80   Pulse 80   Temp 98.4  F (36.9  C)   Ht 1.88 m (6' 2\")   Wt 117.9 kg (260 lb)   SpO2 97%   BMI 33.38 kg/m    Body mass index is 33.38 kg/m .  Physical Exam   GENERAL: healthy, alert and no distress  EYES: Eyes grossly normal to inspection, PERRL and conjunctivae and sclerae normal  RESP: lungs clear to auscultation - no rales, rhonchi or wheezes  MS: no gross musculoskeletal defects noted, no edema  SKIN: left ear-  brown lesion at top of helix, inconsistent pigmentation, approximately 1.5 cm x .5 cm   PSYCH: mentation appears normal, affect normal/bright                .  ..  " Problem: Mobility Impaired (Adult and Pediatric)  Goal: *Acute Goals and Plan of Care (Insert Text)  Description  FUNCTIONAL STATUS PRIOR TO ADMISSION: Patient required minimal assistance for basic and instrumental ADLs. HOME SUPPORT PRIOR TO ADMISSION: The patient lived with family who assisted patient with ADLs and mobility as needed. Drew Lazaro Physical Therapy Goals  Initiated 10/30/2019  1. Patient will move from supine to sit and sit to supine , scoot up and down and roll side to side in bed with independence within 7 day(s). 2.  Patient will transfer from bed to chair and chair to bed with supervision/set-up using the least restrictive device within 7 day(s). 3.  Patient will perform sit to stand with supervision/set-up within 7 day(s). 4.  Patient will ambulate with supervision/set-up for 350 feet with the least restrictive device within 7 day(s). 5.  Patient will ascend/descend 5 stairs with 1 handrail(s) with minimal assistance/contact guard assist within 7 day(s). Note:   PHYSICAL THERAPY TREATMENT  Patient: Lenard Zazueta  (78 y.o. male)  Date: 10/31/2019  Diagnosis: A-fib [facility]) [I48.91]        Precautions: Fall, Bed Alarm, (confusion)  Chart, physical therapy assessment, plan of care and goals were reviewed. ASSESSMENT  Patient continues with skilled PT services and is progressing towards goals, pt tolerated tx well, good progress, no LOB or SOB, does fair with bed mob, good motivation, did well with toilet transfers and ther-ex, vc's for safety and proper RW use. Current Level of Function Impacting Discharge (mobility/balance): min assist x1         PLAN :  Patient continues to benefit from skilled intervention to address the above impairments. Continue treatment per established plan of care. to address goals.     Recommendation for discharge: (in order for the patient to meet his/her long term goals)  Physical therapy at least 2 days/week in the home AND ensure assist and/or supervision for safety with mobility and ADL's     This discharge recommendation:  Has not yet been discussed the attending provider and/or case management    IF patient discharges home will need the following DME: patient owns DME required for discharge     OBJECTIVE DATA SUMMARY:     Critical Behavior:  Neurologic State: Alert  Orientation Level: Oriented to person, Disoriented to place  Cognition: Decreased attention/concentration  Safety/Judgement: Awareness of environment, Good awareness of safety precautions, Fall prevention(weakness and decreased balance)    Functional Mobility Training:  Bed Mobility:  Rolling: Minimum assistance;Assist x1  Supine to Sit: Minimum assistance;Assist x1  Scooting: Minimum assistance;Assist x1  Level of Assistance: Minimum assistance  Interventions: Tactile cues; Verbal cues    Transfers:  Sit to Stand: Contact guard assistance  Stand to Sit: Contact guard assistance  Bed to Chair: Contact guard assistance  Interventions: Tactile cues; Verbal cues  Level of Assistance: Contact guard assistance    Balance:  Sitting: Intact; Without support  Standing: Intact; With support  Standing - Static: Good;Constant support  Standing - Dynamic : Good;Constant support    Ambulation/Gait Training:  Distance (ft): 80 Feet (ft)  Assistive Device: Walker, rolling;Gait belt  Ambulation - Level of Assistance: Contact guard assistance;Assist x1  Gait Abnormalities: Decreased step clearance  Right Side Weight Bearing: Full  Left Side Weight Bearing: Full  Base of Support: Widened  Speed/Maureen: Pace decreased (<100 feet/min)  Step Length: Left shortened;Right shortened    Therapeutic Exercises:   sitting  EXERCISE   Sets   Reps   Active Active Assist   Passive   Comments   Ankle pumps 1 10 ? ? ? bilat   Heel raises 1 10 ? ? ? \"   Toe tap 1 10 ? ? ? \"   Knee ext 1 10 ? ? ? \"   Hip flex 1 10 ? ? ? \"     Pain Ratin/10    Activity Tolerance: Fair    After treatment patient left in no apparent distress: Sitting in chair, Call bell within reach and Bed / chair alarm activated    COMMUNICATION/COLLABORATION:   The patients plan of care was discussed with: Registered Nurse    Kellie Pena PTA   Time Calculation: 30 mins

## 2022-08-29 NOTE — PATIENT INSTRUCTIONS
Referral to audiology for ringing- tinnitus evaluation.     Referral to dermatology for spot on your ear.

## 2022-09-01 ENCOUNTER — OFFICE VISIT (OUTPATIENT)
Dept: DERMATOLOGY | Facility: CLINIC | Age: 52
End: 2022-09-01
Attending: NURSE PRACTITIONER
Payer: COMMERCIAL

## 2022-09-01 DIAGNOSIS — D48.5 NEOPLASM OF UNCERTAIN BEHAVIOR OF SKIN: ICD-10-CM

## 2022-09-01 PROCEDURE — 11102 TANGNTL BX SKIN SINGLE LES: CPT | Performed by: PHYSICIAN ASSISTANT

## 2022-09-01 PROCEDURE — 88342 IMHCHEM/IMCYTCHM 1ST ANTB: CPT | Performed by: PATHOLOGY

## 2022-09-01 PROCEDURE — 99202 OFFICE O/P NEW SF 15 MIN: CPT | Mod: 25 | Performed by: PHYSICIAN ASSISTANT

## 2022-09-01 PROCEDURE — 88305 TISSUE EXAM BY PATHOLOGIST: CPT | Performed by: PATHOLOGY

## 2022-09-01 ASSESSMENT — PAIN SCALES - GENERAL: PAINLEVEL: NO PAIN (0)

## 2022-09-01 NOTE — NURSING NOTE
Chief Complaint   Patient presents with     Derm Problem     Spot on right ear, that has changed in size        There were no vitals filed for this visit.  Wt Readings from Last 1 Encounters:   08/29/22 117.9 kg (260 lb)       Kinsey Vazquez LPN .................9/1/2022

## 2022-09-01 NOTE — LETTER
9/1/2022         RE: Grabiel Cotto  [address] [address] [place] [id number]        Dear Colleague,    Thank you for referring your patient, Grabiel Cotto, to the [facility]. Please see a copy of my visit note below.    Grabiel Cotto is an extremely pleasant 52 year old year old male patient here today for spot on right ear. Present for years. He notes growing slowly in size. He denies any pain or bleeding.  Patient has no other skin complaints today.  Remainder of the HPI, Meds, PMH, Allergies, FH, and SH was reviewed in chart.    Pertinent Hx:   No personal history of skin cancer   Past Medical History:   Diagnosis Date     High cholesterol      NO ACTIVE PROBLEMS        Past Surgical History:   Procedure Laterality Date     COLONOSCOPY  09/25/2020    1 polyp removed     COLONOSCOPY WITH CO2 INSUFFLATION N/A 9/25/2020    Procedure: COLONOSCOPY, WITH CO2 INSUFFLATION;  Surgeon: Kiel Velasco DO;  Location: [facility]     COMBINED ESOPHAGOSCOPY, GASTROSCOPY, DUODENOSCOPY (EGD) WITH CO2 INSUFFLATION N/A 9/25/2020    Procedure: ESOPHAGOGASTRODUODENOSCOPY, WITH CO2 INSUFFLATION;  Surgeon: Kiel Velasco DO;  Location: [facility]     ESOPHAGOSCOPY, GASTROSCOPY, DUODENOSCOPY (EGD), COMBINED N/A 9/25/2020    Procedure: Esophagogastroduodenoscopy, With Biopsy;  Surgeon: Kiel Velasco DO;  Location: [facility]     SHOULDER SURGERY      R shoulder surgery        Family History   Problem Relation Age of Onset     Cancer Father      Dementia Maternal Grandfather      Cancer Maternal Grandfather      Cancer Paternal Grandfather        Social History     Socioeconomic History     Marital status:      Spouse name: Not on file     Number of children: Not on file     Years of education: Not on file     Highest education level: Not on file   Occupational History     Not on file   Tobacco Use     Smoking status: Former Smoker     Types: Cigars     Quit date: 10/23/2015     Years since quitting:  6.8     Smokeless tobacco: Current User     Types: Snuff   Substance and Sexual Activity     Alcohol use: Yes     Comment: weekends 3-4 a day     Drug use: No     Sexual activity: Yes     Partners: Female   Other Topics Concern     Parent/sibling w/ CABG, MI or angioplasty before 65F 55M? Not Asked   Social History Narrative     Not on file     Social Determinants of Health     Financial Resource Strain: Not on file   Food Insecurity: Not on file   Transportation Needs: Not on file   Physical Activity: Not on file   Stress: Not on file   Social Connections: Not on file   Intimate Partner Violence: Not on file   Housing Stability: Not on file       Outpatient Encounter Medications as of 9/1/2022   Medication Sig Dispense Refill     bisacodyl (DULCOLAX) 5 MG EC tablet Take 1 tablet (5 mg) by mouth See Admin Instructions -Day prior to procedure take 1 tablet bisacodyl at 12:00. (Patient not taking: No sig reported) 1 tablet 0     Na Sulfate-K Sulfate-Mg Sulf (SUPREP BOWEL PREP KIT) solution Take 177 mLs (1 Bottle) by mouth See Admin Instructions -Evening before procedure complete steps 1 through 4 (see package) using one 6 ounce bottle before bed.  Morning of procedure, repeat steps 1 through 4 using the other 6 ounce bottle. (Patient not taking: No sig reported) 2 Bottle 0     Omega-3 Fatty Acids (FISH OIL BURP-LESS) 1000 MG CAPS Take 1,000 mg by mouth daily (Patient not taking: No sig reported)       pantoprazole (PROTONIX) 40 MG EC tablet Take 1 tablet (40 mg) by mouth daily (Patient not taking: No sig reported) 30 tablet 3     polyethylene glycol (GOLYTELY) 236 g suspension Take 4,000 mLs (4 L) by mouth See Admin Instructions -Mix GoLytely as directed on container.  At 6:00 PM, drink an 8 Oz glass of solution and continue drinking 8 Oz glass every 15 minutes until bottle is empty. (Patient not taking: No sig reported) 4 L 0     Simethicone 125 MG TABS Take 125 mg by mouth See Admin Instructions (Patient not taking:  No sig reported) 1 tablet 0     Simethicone 125 MG TABS Take 125 mg by mouth See Admin Instructions -Take tablet after finishing second half of Suprep with half a glass of water. (Patient not taking: No sig reported) 1 tablet 0     UNABLE TO FIND MEDICATION NAME: CBD tablet 30 mg- 1 tablet daily (Patient not taking: No sig reported)       No facility-administered encounter medications on file as of 9/1/2022.             O:   NAD, WDWN, Alert & Oriented, Mood & Affect wnl, Vitals stable   Here today alone   There were no vitals taken for this visit.   General appearance normal   Vitals stable   Alert, oriented and in no acute distress     1.5 x 1.1 brown thin plaque with variation in brown on right superior helix       Eyes: Conjunctivae/lids:Normal     ENT: Lips normal    MSK:Normal    Cardiovascular: peripheral edema none    Pulm: Breathing Normal    Neuro/Psych: Orientation:Alert and Orientedx3 ; Mood/Affect:normal     A/P:  1. R/O seborrheic keratosis vs melanoma on right superior helix  TANGENTIAL BIOPSY SENT OUT:  After consent, anesthesia with LEC and prep, tangential excision performed and specimen sent out for permanent section histology.  No complications and routine wound care. Patient told to call our office in 1-2 weeks for result.        Return pending biopsy results.       Again, thank you for allowing me to participate in the care of your patient.        Sincerely,        Renee Martinez PA-C

## 2022-09-01 NOTE — PATIENT INSTRUCTIONS
Wound Care Instructions     FOR SUPERFICIAL WOUNDS     [facility] [phone]  [facility] [phone]    Right Ear               AFTER 24 HOURS YOU SHOULD REMOVE THE BANDAGE AND BEGIN DAILY DRESSING CHANGES AS FOLLOWS:     1) Remove Dressing.     2) Clean and dry the area with tap water using a Q-tip or sterile gauze pad.     3) Apply Vaseline, Aquaphor, Polysporin ointment or Bacitracin ointment over entire wound.  Do NOT use Neosporin ointment.     4) Cover the wound with a band-aid, or a sterile non-stick gauze pad and micropore paper tape      REPEAT THESE INSTRUCTIONS AT LEAST ONCE A DAY UNTIL THE WOUND HAS COMPLETELY HEALED.    It is an old wives tale that a wound heals better when it is exposed to air and allowed to dry out. The wound will heal faster with a better cosmetic result if it is kept moist with ointment and covered with a bandage.    **Do not let the wound dry out.**      Supplies Needed:      *Cotton tipped applicators (Q-tips)    *Polysporin Ointment or Bacitracin Ointment (NOT NEOSPORIN)    *Band-aids or non-stick gauze pads and micropore paper tape.      PATIENT INFORMATION:    During the healing process you will notice a number of changes. All wounds develop a small halo of redness surrounding the wound.  This means healing is occurring. Severe itching with extensive redness usually indicates sensitivity to the ointment or bandage tape used to dress the wound.  You should call our office if this develops.      Swelling  and/or discoloration around your surgical site is common, particularly when performed around the eye.    All wounds normally drain.  The larger the wound the more drainage there will be.  After 7-10 days, you will notice the wound beginning to shrink and new skin will begin to grow.  The wound is healed when you can see skin has formed over the entire area.  A healed wound has a healthy, shiny look to the surface and is red to dark pink in  color to normalize.  Wounds may take approximately 4-6 weeks to heal.  Larger wounds may take 6-8 weeks.  After the wound is healed you may discontinue dressing changes.    You may experience a sensation of tightness as your wound heals. This is normal and will gradually subside.    Your healed wound may be sensitive to temperature changes. This sensitivity improves with time, but if you re having a lot of discomfort, try to avoid temperature extremes.    Patients frequently experience itching after their wound appears to have healed because of the continue healing under the skin.  Plain Vaseline will help relieve the itching.    POSSIBLE COMPLICATIONS    BLEEDING:    Leave the bandage in place.  Use tightly rolled up gauze or a cloth to apply direct pressure over the bandage for 30  minutes.  Reapply pressure for an additional 30 minutes if necessary  Use additional gauze and tape to maintain pressure once the bleeding has stopped.

## 2022-09-06 NOTE — PROGRESS NOTES
Grabiel Cotto is an extremely pleasant 52 year old year old male patient here today for spot on right ear. Present for years. He notes growing slowly in size. He denies any pain or bleeding.  Patient has no other skin complaints today.  Remainder of the HPI, Meds, PMH, Allergies, FH, and SH was reviewed in chart.    Pertinent Hx:   No personal history of skin cancer   Past Medical History:   Diagnosis Date     High cholesterol      NO ACTIVE PROBLEMS        Past Surgical History:   Procedure Laterality Date     COLONOSCOPY  2020    1 polyp removed     COLONOSCOPY WITH CO2 INSUFFLATION N/A 2020    Procedure: COLONOSCOPY, WITH CO2 INSUFFLATION;  Surgeon: Kiel Velasco DO;  Location: [facility]     COMBINED ESOPHAGOSCOPY, GASTROSCOPY, DUODENOSCOPY (EGD) WITH CO2 INSUFFLATION N/A 2020    Procedure: ESOPHAGOGASTRODUODENOSCOPY, WITH CO2 INSUFFLATION;  Surgeon: Kiel Velasco DO;  Location: [facility]     ESOPHAGOSCOPY, GASTROSCOPY, DUODENOSCOPY (EGD), COMBINED N/A 2020    Procedure: Esophagogastroduodenoscopy, With Biopsy;  Surgeon: Kiel Velasco DO;  Location: [facility]     SHOULDER SURGERY      R shoulder surgery        Family History   Problem Relation Age of Onset     Cancer Father      Dementia Maternal Grandfather      Cancer Maternal Grandfather      Cancer Paternal Grandfather        Social History     Socioeconomic History     Marital status:      Spouse name: Not on file     Number of children: Not on file     Years of education: Not on file     Highest education level: Not on file   Occupational History     Not on file   Tobacco Use     Smoking status: Former Smoker     Types: Cigars     Quit date: 10/23/2015     Years since quittin.8     Smokeless tobacco: Current User     Types: Snuff   Substance and Sexual Activity     Alcohol use: Yes     Comment: weekends 3-4 a day     Drug use: No     Sexual activity: Yes     Partners: Female   Other Topics Concern     Parent/sibling w/  CABG, MI or angioplasty before 65F 55M? Not Asked   Social History Narrative     Not on file     Social Determinants of Health     Financial Resource Strain: Not on file   Food Insecurity: Not on file   Transportation Needs: Not on file   Physical Activity: Not on file   Stress: Not on file   Social Connections: Not on file   Intimate Partner Violence: Not on file   Housing Stability: Not on file       Outpatient Encounter Medications as of 9/1/2022   Medication Sig Dispense Refill     bisacodyl (DULCOLAX) 5 MG EC tablet Take 1 tablet (5 mg) by mouth See Admin Instructions -Day prior to procedure take 1 tablet bisacodyl at 12:00. (Patient not taking: No sig reported) 1 tablet 0     Na Sulfate-K Sulfate-Mg Sulf (SUPREP BOWEL PREP KIT) solution Take 177 mLs (1 Bottle) by mouth See Admin Instructions -Evening before procedure complete steps 1 through 4 (see package) using one 6 ounce bottle before bed.  Morning of procedure, repeat steps 1 through 4 using the other 6 ounce bottle. (Patient not taking: No sig reported) 2 Bottle 0     Omega-3 Fatty Acids (FISH OIL BURP-LESS) 1000 MG CAPS Take 1,000 mg by mouth daily (Patient not taking: No sig reported)       pantoprazole (PROTONIX) 40 MG EC tablet Take 1 tablet (40 mg) by mouth daily (Patient not taking: No sig reported) 30 tablet 3     polyethylene glycol (GOLYTELY) 236 g suspension Take 4,000 mLs (4 L) by mouth See Admin Instructions -Mix GoLytely as directed on container.  At 6:00 PM, drink an 8 Oz glass of solution and continue drinking 8 Oz glass every 15 minutes until bottle is empty. (Patient not taking: No sig reported) 4 L 0     Simethicone 125 MG TABS Take 125 mg by mouth See Admin Instructions (Patient not taking: No sig reported) 1 tablet 0     Simethicone 125 MG TABS Take 125 mg by mouth See Admin Instructions -Take tablet after finishing second half of Suprep with half a glass of water. (Patient not taking: No sig reported) 1 tablet 0     UNABLE TO FIND  MEDICATION NAME: CBD tablet 30 mg- 1 tablet daily (Patient not taking: No sig reported)       No facility-administered encounter medications on file as of 9/1/2022.             O:   NAD, WDWN, Alert & Oriented, Mood & Affect wnl, Vitals stable   Here today alone   There were no vitals taken for this visit.   General appearance normal   Vitals stable   Alert, oriented and in no acute distress     1.5 x 1.1 brown thin plaque with variation in brown on right superior helix       Eyes: Conjunctivae/lids:Normal     ENT: Lips normal    MSK:Normal    Cardiovascular: peripheral edema none    Pulm: Breathing Normal    Neuro/Psych: Orientation:Alert and Orientedx3 ; Mood/Affect:normal     A/P:  1. R/O seborrheic keratosis vs melanoma on right superior helix  TANGENTIAL BIOPSY SENT OUT:  After consent, anesthesia with LEC and prep, tangential excision performed and specimen sent out for permanent section histology.  No complications and routine wound care. Patient told to call our office in 1-2 weeks for result.        Return pending biopsy results.

## 2022-09-07 LAB
PATH REPORT.COMMENTS IMP SPEC: NORMAL
PATH REPORT.COMMENTS IMP SPEC: NORMAL
PATH REPORT.FINAL DX SPEC: NORMAL
PATH REPORT.GROSS SPEC: NORMAL
PATH REPORT.MICROSCOPIC SPEC OTHER STN: NORMAL
PATH REPORT.RELEVANT HX SPEC: NORMAL

## 2022-10-23 NOTE — PROGRESS NOTES
I am seeing this patient in consultation for tinnitus of both ears at the request of the provider Denice Gannon.    Chief Complaint - Tinnitus    History of Present Illness - Grabiel Cotto is a 52 year old male who presents to me today with ringing in the ears.  It has been present and noticeable for approximately 2 years. It waxes and wanes. Both ears. He denies hearing loss. There is no history of chronic ear disease or ear surgery. With regards to recreational, , and work-related noise exposure some as a teenager. No family history of hearing loss at a young age. No regular use of aspirin or NSAIDS or any medication. I personally reviewed the relevant clinical notes in Epic including the primary care providers note.     Tests personally reviewed today for this visit:   1.) audiogram today - downsloping right sensorineural hearing loss, boarderline left sensorineural hearing loss.   2.) tympanogram today - type A    Past Medical History -   Patient Active Problem List   Diagnosis     Right shoulder pain     Supraspinatus tendon tear, right, initial encounter     Impingement syndrome of right shoulder     AC (acromioclavicular) joint arthritis     Complete tear of right rotator cuff     S/P right rotator cuff repair     Dark stools     2019 novel coronavirus disease (COVID-19)       Current Medications -   Current Outpatient Medications:      bisacodyl (DULCOLAX) 5 MG EC tablet, Take 1 tablet (5 mg) by mouth See Admin Instructions -Day prior to procedure take 1 tablet bisacodyl at 12:00. (Patient not taking: No sig reported), Disp: 1 tablet, Rfl: 0     Na Sulfate-K Sulfate-Mg Sulf (SUPREP BOWEL PREP KIT) solution, Take 177 mLs (1 Bottle) by mouth See Admin Instructions -Evening before procedure complete steps 1 through 4 (see package) using one 6 ounce bottle before bed.  Morning of procedure, repeat steps 1 through 4 using the other 6 ounce bottle. (Patient not taking: No sig reported), Disp: 2  Bottle, Rfl: 0     Omega-3 Fatty Acids (FISH OIL BURP-LESS) 1000 MG CAPS, Take 1,000 mg by mouth daily (Patient not taking: No sig reported), Disp: , Rfl:      pantoprazole (PROTONIX) 40 MG EC tablet, Take 1 tablet (40 mg) by mouth daily (Patient not taking: No sig reported), Disp: 30 tablet, Rfl: 3     polyethylene glycol (GOLYTELY) 236 g suspension, Take 4,000 mLs (4 L) by mouth See Admin Instructions -Mix GoLytely as directed on container.  At 6:00 PM, drink an 8 Oz glass of solution and continue drinking 8 Oz glass every 15 minutes until bottle is empty. (Patient not taking: No sig reported), Disp: 4 L, Rfl: 0     Simethicone 125 MG TABS, Take 125 mg by mouth See Admin Instructions (Patient not taking: No sig reported), Disp: 1 tablet, Rfl: 0     Simethicone 125 MG TABS, Take 125 mg by mouth See Admin Instructions -Take tablet after finishing second half of Suprep with half a glass of water. (Patient not taking: No sig reported), Disp: 1 tablet, Rfl: 0     UNABLE TO FIND, MEDICATION NAME: CBD tablet 30 mg- 1 tablet daily (Patient not taking: No sig reported), Disp: , Rfl:     Allergies - No Known Allergies    Social History -   Social History     Socioeconomic History     Marital status:    Tobacco Use     Smoking status: Former     Types: Cigars     Quit date: 10/23/2015     Years since quittin.0     Smokeless tobacco: Current     Types: Snuff   Substance and Sexual Activity     Alcohol use: Yes     Comment: weekends 3-4 a day     Drug use: No     Sexual activity: Yes     Partners: Female       Family History -   Family History   Problem Relation Age of Onset     Cancer Father      Dementia Maternal Grandfather      Cancer Maternal Grandfather      Cancer Paternal Grandfather        Review of Systems - As per HPI and PMHx, otherwise 7 system review of the head and neck is negative.    Physical Exam  Pulse 85   SpO2 98%    General - The patient is in no distress. Alert and oriented to person and  place, answers questions and cooperates with examination appropriately.   Neurologic - CN II-XII are grossly intact. No focal neurologic deficits.   Voice and Breathing - The patient was breathing comfortably without the use of accessory muscles. There was no wheezing, stridor, or stertor.  The patients voice was clear and strong.  Ears - clean canals, mild wax left. I cleaned this. The tympanic membranes are normal in appearance, bony landmarks are intact.  No retraction, perforation, or masses. No fluid or purulence was seen in the external canal or the middle ear. No evidence of infection of the middle ear or external canal, cerumen was normal in appearance.  Eyes - Extraocular movements intact.  Sclera were not icteric or injected, conjunctiva were pink and moist.  Neck - Soft, non-tender. Palpation of the occipital, submental, submandibular, internal jugular chain, and supraclavicular nodes did not demonstrate any abnormal lymph nodes or masses. No parotid masses. Palpation of the thyroid was soft and smooth, with no nodules or goiter appreciated.  The trachea was mobile and midline.      Assessment and Plan - Grabiel Cotto is a 52 year old male who presents to me today with subjective tinnitus, likely due to hearing loss. He has some asymmetric sensorineural hearing loss with the right side worse. We discussed MRI brain to r/o retrocochlear pathology versus serial audiograms. He will return in 6-12 months for repeat audiogram.     We spent the remainder of today's visit on education. Discussed were steps that can be taken to mask the noise, such as a low volume de-tuned radio, a fan in the background, and/or hearing aids.  Correlation with stress, anxiety, and depression were also discussed.       Sergio Carr MD  Otolaryngology  [facility]

## 2022-10-27 ENCOUNTER — OFFICE VISIT (OUTPATIENT)
Dept: OTOLARYNGOLOGY | Facility: CLINIC | Age: 52
End: 2022-10-27
Attending: NURSE PRACTITIONER
Payer: COMMERCIAL

## 2022-10-27 ENCOUNTER — OFFICE VISIT (OUTPATIENT)
Dept: AUDIOLOGY | Facility: CLINIC | Age: 52
End: 2022-10-27
Attending: NURSE PRACTITIONER
Payer: COMMERCIAL

## 2022-10-27 VITALS — OXYGEN SATURATION: 98 % | HEART RATE: 85 BPM

## 2022-10-27 DIAGNOSIS — H90.3 SNHL (SENSORY-NEURAL HEARING LOSS), ASYMMETRICAL: ICD-10-CM

## 2022-10-27 DIAGNOSIS — H93.13 TINNITUS, BILATERAL: ICD-10-CM

## 2022-10-27 DIAGNOSIS — H90.41 SENSORINEURAL HEARING LOSS (SNHL) OF RIGHT EAR WITH UNRESTRICTED HEARING OF LEFT EAR: Primary | ICD-10-CM

## 2022-10-27 DIAGNOSIS — H93.13 TINNITUS OF BOTH EARS: ICD-10-CM

## 2022-10-27 DIAGNOSIS — H93.13 TINNITUS, BILATERAL: Primary | ICD-10-CM

## 2022-10-27 PROCEDURE — 92550 TYMPANOMETRY & REFLEX THRESH: CPT | Performed by: AUDIOLOGIST

## 2022-10-27 PROCEDURE — 92557 COMPREHENSIVE HEARING TEST: CPT | Performed by: AUDIOLOGIST

## 2022-10-27 PROCEDURE — 99207 PR NO CHARGE LOS: CPT | Performed by: AUDIOLOGIST

## 2022-10-27 PROCEDURE — 99243 OFF/OP CNSLTJ NEW/EST LOW 30: CPT | Performed by: OTOLARYNGOLOGY

## 2022-10-27 RX ORDER — IBUPROFEN 600 MG/1
600 TABLET, FILM COATED ORAL EVERY 6 HOURS PRN
COMMUNITY
End: 2024-05-03

## 2022-10-27 ASSESSMENT — PAIN SCALES - GENERAL: PAINLEVEL: MODERATE PAIN (4)

## 2022-10-27 NOTE — PROGRESS NOTES
AUDIOLOGY REPORT:    Patient was referred from ENT by Dr. Carr for audiology evaluation. The patient reports bilateral tinnitus that is getting more bothersome. He notes that it is fairly constant but varies in intensity. The patient reports that he had Covid two years ago and his tinnitus has been getting worse since then. He has some hearing concerns. The patient reports a history of noise exposure from occupational noise and concerts. He reports a family history of hearing loss attributed to age and noise exposure. The patient reports that he has headaches, but does not have ear pain or ear pressure. He does not have a history of ear problems or ear surgery.    Testing:    Otoscopy:   Otoscopic exam indicates ears are clear of cerumen bilaterally     Tympanograms:    RIGHT: normal eardrum mobility     LEFT:   normal eardrum mobility    Reflexes (reported by stimulus ear):  RIGHT: Ipsilateral is present at normal levels  RIGHT: Contralateral is present at normal levels  LEFT:   Ipsilateral is present at normal levels  LEFT:   Contralateral is present at normal levels    Thresholds:   Pure Tone Thresholds assessed using conventional audiometry with good reliability from 250-8000 Hz bilaterally using insert earphones and circumaural headphones     RIGHT:  normal hearing sensitivity through 4000 Hz sloping to mild to moderate likely sensorineural hearing loss    LEFT:    normal hearing sensitivity at all tested frequencies     Speech Reception Threshold:    RIGHT: 10 dB HL    LEFT:   5 dB HL  Results are in agreement with pure tone average.     Word Recognition Score:     RIGHT: 100% at 50 dB HL using NU-6 recorded word list.    LEFT:   100% at 50 dB HL using NU-6 recorded word list.    Discussed results with the patient.     Patient was returned to ENT for follow up.     Reece Chun, CCC-A  Licensed Audiologist #[id number]  10/27/2022

## 2022-10-27 NOTE — LETTER
10/27/2022         RE: Grabiel Cotto  [address] [address] [place] [id number]        Dear Colleague,    Thank you for referring your patient, Grabiel Cotto, to the [facility]. Please see a copy of my visit note below.    I am seeing this patient in consultation for tinnitus of both ears at the request of the provider Denice Gannon.    Chief Complaint - Tinnitus    History of Present Illness - Grabiel Cotto is a 52 year old male who presents to me today with ringing in the ears.  It has been present and noticeable for approximately 2 years. It waxes and wanes. Both ears. He denies hearing loss. There is no history of chronic ear disease or ear surgery. With regards to recreational, , and work-related noise exposure some as a teenager. No family history of hearing loss at a young age. No regular use of aspirin or NSAIDS or any medication. I personally reviewed the relevant clinical notes in Epic including the primary care providers note.     Tests personally reviewed today for this visit:   1.) audiogram today - downsloping right sensorineural hearing loss, boarderline left sensorineural hearing loss.   2.) tympanogram today - type A    Past Medical History -   Patient Active Problem List   Diagnosis     Right shoulder pain     Supraspinatus tendon tear, right, initial encounter     Impingement syndrome of right shoulder     AC (acromioclavicular) joint arthritis     Complete tear of right rotator cuff     S/P right rotator cuff repair     Dark stools     2019 novel coronavirus disease (COVID-19)       Current Medications -   Current Outpatient Medications:      bisacodyl (DULCOLAX) 5 MG EC tablet, Take 1 tablet (5 mg) by mouth See Admin Instructions -Day prior to procedure take 1 tablet bisacodyl at 12:00. (Patient not taking: No sig reported), Disp: 1 tablet, Rfl: 0     Na Sulfate-K Sulfate-Mg Sulf (SUPREP BOWEL PREP KIT) solution, Take 177 mLs (1 Bottle) by mouth See  Admin Instructions -Evening before procedure complete steps 1 through 4 (see package) using one 6 ounce bottle before bed.  Morning of procedure, repeat steps 1 through 4 using the other 6 ounce bottle. (Patient not taking: No sig reported), Disp: 2 Bottle, Rfl: 0     Omega-3 Fatty Acids (FISH OIL BURP-LESS) 1000 MG CAPS, Take 1,000 mg by mouth daily (Patient not taking: No sig reported), Disp: , Rfl:      pantoprazole (PROTONIX) 40 MG EC tablet, Take 1 tablet (40 mg) by mouth daily (Patient not taking: No sig reported), Disp: 30 tablet, Rfl: 3     polyethylene glycol (GOLYTELY) 236 g suspension, Take 4,000 mLs (4 L) by mouth See Admin Instructions -Mix GoLytely as directed on container.  At 6:00 PM, drink an 8 Oz glass of solution and continue drinking 8 Oz glass every 15 minutes until bottle is empty. (Patient not taking: No sig reported), Disp: 4 L, Rfl: 0     Simethicone 125 MG TABS, Take 125 mg by mouth See Admin Instructions (Patient not taking: No sig reported), Disp: 1 tablet, Rfl: 0     Simethicone 125 MG TABS, Take 125 mg by mouth See Admin Instructions -Take tablet after finishing second half of Suprep with half a glass of water. (Patient not taking: No sig reported), Disp: 1 tablet, Rfl: 0     UNABLE TO FIND, MEDICATION NAME: CBD tablet 30 mg- 1 tablet daily (Patient not taking: No sig reported), Disp: , Rfl:     Allergies - No Known Allergies    Social History -   Social History     Socioeconomic History     Marital status:    Tobacco Use     Smoking status: Former     Types: Cigars     Quit date: 10/23/2015     Years since quittin.0     Smokeless tobacco: Current     Types: Snuff   Substance and Sexual Activity     Alcohol use: Yes     Comment: weekends 3-4 a day     Drug use: No     Sexual activity: Yes     Partners: Female       Family History -   Family History   Problem Relation Age of Onset     Cancer Father      Dementia Maternal Grandfather      Cancer Maternal Grandfather      Cancer  Paternal Grandfather        Review of Systems - As per HPI and PMHx, otherwise 7 system review of the head and neck is negative.    Physical Exam  Pulse 85   SpO2 98%    General - The patient is in no distress. Alert and oriented to person and place, answers questions and cooperates with examination appropriately.   Neurologic - CN II-XII are grossly intact. No focal neurologic deficits.   Voice and Breathing - The patient was breathing comfortably without the use of accessory muscles. There was no wheezing, stridor, or stertor.  The patients voice was clear and strong.  Ears - clean canals, mild wax left. I cleaned this. The tympanic membranes are normal in appearance, bony landmarks are intact.  No retraction, perforation, or masses. No fluid or purulence was seen in the external canal or the middle ear. No evidence of infection of the middle ear or external canal, cerumen was normal in appearance.  Eyes - Extraocular movements intact.  Sclera were not icteric or injected, conjunctiva were pink and moist.  Neck - Soft, non-tender. Palpation of the occipital, submental, submandibular, internal jugular chain, and supraclavicular nodes did not demonstrate any abnormal lymph nodes or masses. No parotid masses. Palpation of the thyroid was soft and smooth, with no nodules or goiter appreciated.  The trachea was mobile and midline.      Assessment and Plan - Grabiel Cotto is a 52 year old male who presents to me today with subjective tinnitus, likely due to hearing loss. He has some asymmetric sensorineural hearing loss with the right side worse. We discussed MRI brain to r/o retrocochlear pathology versus serial audiograms. He will return in 6-12 months for repeat audiogram.     We spent the remainder of today's visit on education. Discussed were steps that can be taken to mask the noise, such as a low volume de-tuned radio, a fan in the background, and/or hearing aids.  Correlation with stress, anxiety, and  depression were also discussed.       Sergio Carr MD  Otolaryngology  [facility]            Again, thank you for allowing me to participate in the care of your patient.        Sincerely,        Sergio Carr MD

## 2022-11-11 ENCOUNTER — OFFICE VISIT (OUTPATIENT)
Dept: FAMILY MEDICINE | Facility: CLINIC | Age: 52
End: 2022-11-11
Payer: COMMERCIAL

## 2022-11-11 VITALS
HEART RATE: 84 BPM | BODY MASS INDEX: 32.73 KG/M2 | DIASTOLIC BLOOD PRESSURE: 80 MMHG | SYSTOLIC BLOOD PRESSURE: 124 MMHG | HEIGHT: 74 IN | OXYGEN SATURATION: 95 % | TEMPERATURE: 96.9 F | WEIGHT: 255 LBS

## 2022-11-11 DIAGNOSIS — E78.5 HYPERLIPIDEMIA LDL GOAL <130: ICD-10-CM

## 2022-11-11 DIAGNOSIS — I10 ESSENTIAL HYPERTENSION: Primary | ICD-10-CM

## 2022-11-11 LAB
ALBUMIN SERPL-MCNC: 4.2 G/DL (ref 3.4–5)
ALP SERPL-CCNC: 60 U/L (ref 40–150)
ALT SERPL W P-5'-P-CCNC: 27 U/L (ref 0–70)
ANION GAP SERPL CALCULATED.3IONS-SCNC: 5 MMOL/L (ref 3–14)
AST SERPL W P-5'-P-CCNC: 13 U/L (ref 0–45)
BASOPHILS # BLD AUTO: 0.1 10E3/UL (ref 0–0.2)
BASOPHILS NFR BLD AUTO: 1 %
BILIRUB SERPL-MCNC: 0.5 MG/DL (ref 0.2–1.3)
BUN SERPL-MCNC: 15 MG/DL (ref 7–30)
CALCIUM SERPL-MCNC: 9.1 MG/DL (ref 8.5–10.1)
CHLORIDE BLD-SCNC: 108 MMOL/L (ref 94–109)
CHOLEST SERPL-MCNC: 266 MG/DL
CO2 SERPL-SCNC: 26 MMOL/L (ref 20–32)
CREAT SERPL-MCNC: 1.04 MG/DL (ref 0.66–1.25)
EOSINOPHIL # BLD AUTO: 0.2 10E3/UL (ref 0–0.7)
EOSINOPHIL NFR BLD AUTO: 3 %
ERYTHROCYTE [DISTWIDTH] IN BLOOD BY AUTOMATED COUNT: 12.9 % (ref 10–15)
FASTING STATUS PATIENT QL REPORTED: YES
GFR SERPL CREATININE-BSD FRML MDRD: 85 ML/MIN/1.73M2
GLUCOSE BLD-MCNC: 97 MG/DL (ref 70–99)
HCT VFR BLD AUTO: 45.8 % (ref 40–53)
HDLC SERPL-MCNC: 42 MG/DL
HGB BLD-MCNC: 16 G/DL (ref 13.3–17.7)
LDLC SERPL CALC-MCNC: 173 MG/DL
LYMPHOCYTES # BLD AUTO: 2.9 10E3/UL (ref 0.8–5.3)
LYMPHOCYTES NFR BLD AUTO: 35 %
MCH RBC QN AUTO: 30.7 PG (ref 26.5–33)
MCHC RBC AUTO-ENTMCNC: 34.9 G/DL (ref 31.5–36.5)
MCV RBC AUTO: 88 FL (ref 78–100)
MONOCYTES # BLD AUTO: 0.9 10E3/UL (ref 0–1.3)
MONOCYTES NFR BLD AUTO: 11 %
NEUTROPHILS # BLD AUTO: 4.2 10E3/UL (ref 1.6–8.3)
NEUTROPHILS NFR BLD AUTO: 50 %
NONHDLC SERPL-MCNC: 224 MG/DL
PLATELET # BLD AUTO: 286 10E3/UL (ref 150–450)
POTASSIUM BLD-SCNC: 4.3 MMOL/L (ref 3.4–5.3)
PROT SERPL-MCNC: 7.5 G/DL (ref 6.8–8.8)
RBC # BLD AUTO: 5.22 10E6/UL (ref 4.4–5.9)
SODIUM SERPL-SCNC: 139 MMOL/L (ref 133–144)
TRIGL SERPL-MCNC: 256 MG/DL
WBC # BLD AUTO: 8.3 10E3/UL (ref 4–11)

## 2022-11-11 PROCEDURE — 80061 LIPID PANEL: CPT | Performed by: PHYSICIAN ASSISTANT

## 2022-11-11 PROCEDURE — 99214 OFFICE O/P EST MOD 30 MIN: CPT | Performed by: PHYSICIAN ASSISTANT

## 2022-11-11 PROCEDURE — 36415 COLL VENOUS BLD VENIPUNCTURE: CPT | Performed by: PHYSICIAN ASSISTANT

## 2022-11-11 PROCEDURE — 85025 COMPLETE CBC W/AUTO DIFF WBC: CPT | Performed by: PHYSICIAN ASSISTANT

## 2022-11-11 PROCEDURE — 80053 COMPREHEN METABOLIC PANEL: CPT | Performed by: PHYSICIAN ASSISTANT

## 2022-11-11 RX ORDER — ATORVASTATIN CALCIUM 20 MG/1
20 TABLET, FILM COATED ORAL DAILY
Qty: 90 TABLET | Refills: 1 | Status: SHIPPED | OUTPATIENT
Start: 2022-11-11 | End: 2023-05-05

## 2022-11-11 RX ORDER — LOSARTAN POTASSIUM 100 MG/1
100 TABLET ORAL DAILY
Qty: 90 TABLET | Refills: 1 | Status: SHIPPED | OUTPATIENT
Start: 2022-11-11 | End: 2023-05-05

## 2022-11-11 ASSESSMENT — PAIN SCALES - GENERAL: PAINLEVEL: NO PAIN (0)

## 2022-11-11 NOTE — PROGRESS NOTES
Assessment & Plan     Essential hypertension  Worsening  - losartan (COZAAR) 100 MG tablet; Take 1 tablet (100 mg) by mouth daily  - Lipid panel reflex to direct LDL Fasting  - Comprehensive metabolic panel (BMP + Alb, Alk Phos, ALT, AST, Total. Bili, TP)  - CBC with platelets and differential    ---To cut losartan in half for first week and up to 100 mg thereafter for blood pressure  ---Continue checking pressure with machine at home  ---We can order echo if pressures continue to differ from left to right arm      HTN:  Checks his pressure regularly and found that it is on average 160/85. Was high on appt today, but came down. Had machine certified for accuracy. With these findings it is best to add on medication to treat condition.  Is anxious about the high numbers and cholesterol and wonders if readings that differ from arm to arm make a difference. Does not have symptoms of numbness, tingling, pain in either arm. No chest pain or shortness of breath         Return in about 1 month (around 12/11/2022), or if symptoms worsen or fail to improve.    ERIK FENG PA-C  [facility]   Grabiel is a 52 year old, presenting for the following health issues:  Hypertension        History of Present Illness       Reason for visit:  Blood pressure  Symptom onset:  More than a month  Symptoms include:  Restless  Symptom intensity:  Mild  Symptom progression:  Staying the same  Had these symptoms before:  Yes  Has tried/received treatment for these symptoms:  No  What makes it worse:  Stress  What makes it better:  Relaxing    He eats 2-3 servings of fruits and vegetables daily.He consumes 2 sweetened beverage(s) daily.He exercises with enough effort to increase his heart rate 60 or more minutes per day.  He exercises with enough effort to increase his heart rate 6 days per week.   He is taking medications regularly.         Review of Systems   Constitutional, HEENT, cardiovascular,  "pulmonary, gi and gu systems are negative, except as otherwise noted.      Objective    /80   Pulse 84   Temp 96.9  F (36.1  C) (Tympanic)   Ht 1.88 m (6' 2\")   Wt 115.7 kg (255 lb)   SpO2 95%   BMI 32.74 kg/m    Body mass index is 32.74 kg/m .  Physical Exam   GENERAL: healthy, alert and no distress  HENT: ear canals and TM's normal, nose and mouth without ulcers or lesions  NECK: no adenopathy, no asymmetry, masses, or scars and thyroid normal to palpation  RESP: lungs clear to auscultation - no rales, rhonchi or wheezes  CV: regular rate and rhythm, normal S1 S2, no S3 or S4, no murmur, click or rub, no peripheral edema and peripheral pulses strong  ABDOMEN: soft, nontender, no hepatosplenomegaly, no masses and bowel sounds normal  MS: no gross musculoskeletal defects noted, no edema                    "

## 2023-02-22 ENCOUNTER — OFFICE VISIT (OUTPATIENT)
Dept: FAMILY MEDICINE | Facility: CLINIC | Age: 53
End: 2023-02-22
Payer: COMMERCIAL

## 2023-02-22 VITALS
TEMPERATURE: 97.6 F | BODY MASS INDEX: 33.75 KG/M2 | OXYGEN SATURATION: 96 % | HEIGHT: 74 IN | RESPIRATION RATE: 18 BRPM | WEIGHT: 263 LBS | HEART RATE: 80 BPM | SYSTOLIC BLOOD PRESSURE: 126 MMHG | DIASTOLIC BLOOD PRESSURE: 76 MMHG

## 2023-02-22 DIAGNOSIS — R07.9 CHEST PAIN, UNSPECIFIED TYPE: ICD-10-CM

## 2023-02-22 DIAGNOSIS — E78.5 HYPERLIPIDEMIA LDL GOAL <130: ICD-10-CM

## 2023-02-22 DIAGNOSIS — I10 ESSENTIAL HYPERTENSION: Primary | ICD-10-CM

## 2023-02-22 PROCEDURE — 99213 OFFICE O/P EST LOW 20 MIN: CPT | Performed by: PHYSICIAN ASSISTANT

## 2023-02-22 ASSESSMENT — PAIN SCALES - GENERAL: PAINLEVEL: NO PAIN (0)

## 2023-02-22 NOTE — PROGRESS NOTES
"  Assessment & Plan     Essential hypertension  Improving  Blood pressure cuff examined in office and was 25-30 points higher than manual readings. Left arm higher than right arm by 15-20 points in office and out of office.   Will check ECHO and CT calcium score to rule out heart defects.    Hyperlipidemia LDL goal <130  Ongoing  Continue medication as directed       - Echocardiogram Limited; Future  - CT Coronary Calcium Scan; Future         BMI:   Estimated body mass index is 33.77 kg/m  as calculated from the following:    Height as of this encounter: 1.88 m (6' 2\").    Weight as of this encounter: 119.3 kg (263 lb).           Return for 3 months follow up.    ERIK FENG PA-C  [employer] SITA Spain is a 52 year old, presenting for the following health issues:  Hypertension        History of Present Illness       Hyperlipidemia:  He presents for follow up of hyperlipidemia.  He is taking medication to lower cholesterol. He is not having myalgia or other side effects to statin medications.    Hypertension: He presents for follow up of hypertension.  He does check blood pressure  regularly outside of the clinic. Outside blood pressures have been over 140/90. He does not follow a low salt diet.     He eats 2-3 servings of fruits and vegetables daily.He consumes 0 sweetened beverage(s) daily.He exercises with enough effort to increase his heart rate 20 to 29 minutes per day.  He exercises with enough effort to increase his heart rate 5 days per week.   He is taking medications regularly.             Review of Systems   Constitutional, HEENT, cardiovascular, pulmonary, gi and gu systems are negative, except as otherwise noted.      Objective    /76 (BP Location: Right arm)   Pulse 80   Temp 97.6  F (36.4  C) (Tympanic)   Resp 18   Ht 1.88 m (6' 2\")   Wt 119.3 kg (263 lb)   SpO2 96%   BMI 33.77 kg/m    Body mass index is 33.77 kg/m .       Physical Exam   GENERAL: " healthy, alert and no distress  EYES: Eyes grossly normal to inspection, PERRL and conjunctivae and sclerae normal  NECK: no adenopathy, no asymmetry, masses, or scars and thyroid normal to palpation  RESP: lungs clear to auscultation - no rales, rhonchi or wheezes  CV: regular rate and rhythm, normal S1 S2, no S3 or S4, no murmur, click or rub, no peripheral edema and peripheral pulses strong

## 2023-03-02 ENCOUNTER — ANCILLARY PROCEDURE (OUTPATIENT)
Dept: CT IMAGING | Facility: CLINIC | Age: 53
End: 2023-03-02
Attending: PHYSICIAN ASSISTANT

## 2023-03-02 ENCOUNTER — ANCILLARY PROCEDURE (OUTPATIENT)
Dept: CARDIOLOGY | Facility: CLINIC | Age: 53
End: 2023-03-02
Attending: PHYSICIAN ASSISTANT
Payer: COMMERCIAL

## 2023-03-02 DIAGNOSIS — R07.9 CHEST PAIN, UNSPECIFIED TYPE: ICD-10-CM

## 2023-03-02 LAB — LVEF ECHO: NORMAL

## 2023-03-02 PROCEDURE — 75571 CT HRT W/O DYE W/CA TEST: CPT | Mod: GC | Performed by: INTERNAL MEDICINE

## 2023-03-02 PROCEDURE — 93306 TTE W/DOPPLER COMPLETE: CPT | Performed by: INTERNAL MEDICINE

## 2023-04-16 ENCOUNTER — HEALTH MAINTENANCE LETTER (OUTPATIENT)
Age: 53
End: 2023-04-16

## 2023-05-05 ENCOUNTER — TELEPHONE (OUTPATIENT)
Dept: FAMILY MEDICINE | Facility: CLINIC | Age: 53
End: 2023-05-05
Payer: COMMERCIAL

## 2023-05-05 DIAGNOSIS — E78.5 HYPERLIPIDEMIA LDL GOAL <130: ICD-10-CM

## 2023-05-05 DIAGNOSIS — I10 ESSENTIAL HYPERTENSION: ICD-10-CM

## 2023-05-05 RX ORDER — ATORVASTATIN CALCIUM 20 MG/1
20 TABLET, FILM COATED ORAL DAILY
Qty: 90 TABLET | Refills: 1 | Status: SHIPPED | OUTPATIENT
Start: 2023-05-05 | End: 2023-11-02

## 2023-05-05 RX ORDER — LOSARTAN POTASSIUM 100 MG/1
100 TABLET ORAL DAILY
Qty: 90 TABLET | Refills: 1 | Status: SHIPPED | OUTPATIENT
Start: 2023-05-05 | End: 2023-11-02

## 2023-05-05 NOTE — TELEPHONE ENCOUNTER
Medication Question or Refill        What medication are you calling about (include dose and sig)?: atorvastatin, chandrikaatan    Preferred Pharmacy:     [id number] - [place], [place] - [address] [address] [address] [facility] [address]. [place]  [address] [address]. [place] [address]  Phone: [phone] Fax: [phone]      Controlled Substance Agreement on file:   CSA -- Patient Level:    CSA: None found at the patient level.       Who prescribed the medication?: PCP    Do you need a refill? Yes    When did you use the medication last? daily    Patient offered an appointment? No    Do you have any questions or concerns?  Yes: going on vacation, prescription will run out while on vacation. Would like by today      Could we send this information to you in [facility] [employer] or would you prefer to receive a phone call?:   Patient would prefer a phone call   Okay to leave a detailed message?: Yes at Home number on file [phone] (home)

## 2023-06-16 ENCOUNTER — TELEPHONE (OUTPATIENT)
Dept: FAMILY MEDICINE | Facility: CLINIC | Age: 53
End: 2023-06-16
Payer: COMMERCIAL

## 2023-06-16 NOTE — TELEPHONE ENCOUNTER
Patient Quality Outreach    Patient is due for the following:   Physical Preventive Adult Physical      Topic Date Due     Zoster (Shingles) Vaccine (1 of 2) Never done       Next Steps:   Schedule a Adult Preventative    Type of outreach:    Sent [employer] message.      Questions for provider review:               Rosy Street MA

## 2023-11-02 DIAGNOSIS — I10 ESSENTIAL HYPERTENSION: ICD-10-CM

## 2023-11-02 DIAGNOSIS — E78.5 HYPERLIPIDEMIA LDL GOAL <130: ICD-10-CM

## 2023-11-02 RX ORDER — LOSARTAN POTASSIUM 100 MG/1
100 TABLET ORAL DAILY
Qty: 30 TABLET | Refills: 0 | Status: SHIPPED | OUTPATIENT
Start: 2023-11-02 | End: 2023-12-09

## 2023-11-02 RX ORDER — ATORVASTATIN CALCIUM 20 MG/1
20 TABLET, FILM COATED ORAL DAILY
Qty: 30 TABLET | Refills: 0 | Status: SHIPPED | OUTPATIENT
Start: 2023-11-02 | End: 2023-12-09

## 2023-12-09 ENCOUNTER — MYC REFILL (OUTPATIENT)
Dept: FAMILY MEDICINE | Facility: CLINIC | Age: 53
End: 2023-12-09
Payer: COMMERCIAL

## 2023-12-09 DIAGNOSIS — I10 ESSENTIAL HYPERTENSION: ICD-10-CM

## 2023-12-09 DIAGNOSIS — E78.5 HYPERLIPIDEMIA LDL GOAL <130: ICD-10-CM

## 2023-12-12 RX ORDER — ATORVASTATIN CALCIUM 20 MG/1
20 TABLET, FILM COATED ORAL DAILY
Qty: 60 TABLET | Refills: 0 | Status: SHIPPED | OUTPATIENT
Start: 2023-12-12 | End: 2024-02-04

## 2023-12-12 RX ORDER — LOSARTAN POTASSIUM 100 MG/1
100 TABLET ORAL DAILY
Qty: 60 TABLET | Refills: 0 | Status: SHIPPED | OUTPATIENT
Start: 2023-12-12 | End: 2024-02-04

## 2024-02-04 ENCOUNTER — MYC REFILL (OUTPATIENT)
Dept: FAMILY MEDICINE | Facility: CLINIC | Age: 54
End: 2024-02-04
Payer: COMMERCIAL

## 2024-02-04 DIAGNOSIS — I10 ESSENTIAL HYPERTENSION: ICD-10-CM

## 2024-02-04 DIAGNOSIS — E78.5 HYPERLIPIDEMIA LDL GOAL <130: ICD-10-CM

## 2024-02-05 RX ORDER — ATORVASTATIN CALCIUM 20 MG/1
20 TABLET, FILM COATED ORAL DAILY
Qty: 60 TABLET | Refills: 0 | Status: SHIPPED | OUTPATIENT
Start: 2024-02-05 | End: 2024-04-08

## 2024-02-05 RX ORDER — LOSARTAN POTASSIUM 100 MG/1
100 TABLET ORAL DAILY
Qty: 60 TABLET | Refills: 0 | Status: SHIPPED | OUTPATIENT
Start: 2024-02-05 | End: 2024-04-08

## 2024-04-08 ENCOUNTER — MYC REFILL (OUTPATIENT)
Dept: FAMILY MEDICINE | Facility: CLINIC | Age: 54
End: 2024-04-08
Payer: COMMERCIAL

## 2024-04-08 DIAGNOSIS — I10 ESSENTIAL HYPERTENSION: ICD-10-CM

## 2024-04-08 DIAGNOSIS — E78.5 HYPERLIPIDEMIA LDL GOAL <130: ICD-10-CM

## 2024-04-09 RX ORDER — ATORVASTATIN CALCIUM 20 MG/1
20 TABLET, FILM COATED ORAL DAILY
Qty: 30 TABLET | Refills: 0 | Status: SHIPPED | OUTPATIENT
Start: 2024-04-09 | End: 2024-05-03

## 2024-04-09 RX ORDER — LOSARTAN POTASSIUM 100 MG/1
100 TABLET ORAL DAILY
Qty: 30 TABLET | Refills: 0 | Status: SHIPPED | OUTPATIENT
Start: 2024-04-09 | End: 2024-05-03

## 2024-04-29 NOTE — PROGRESS NOTES
"  {PROVIDER CHARTING PREFERENCE:046252}    Heike Spain is a 54 year old, presenting for the following health issues:  Hypertension and Lipids          5/3/2024     6:52 AM   Additional Questions   Roomed by Eli Benitez MA   Accompanied by Self     History of Present Illness       Hyperlipidemia:  He presents for follow up of hyperlipidemia.   He is taking medication to lower cholesterol. He is not having myalgia or other side effects to statin medications.    Hypertension: He presents for follow up of hypertension.  He does not check blood pressure  regularly outside of the clinic. Outpatient blood pressures have not been over 140/90. He does not follow a low salt diet.     He eats 2-3 servings of fruits and vegetables daily.He consumes 2 sweetened beverage(s) daily.He exercises with enough effort to increase his heart rate 30 to 60 minutes per day.  He exercises with enough effort to increase his heart rate 5 days per week.   He is taking medications regularly.       Review of Systems  Constitutional, neuro, ENT, endocrine, pulmonary, cardiac, gastrointestinal, genitourinary, musculoskeletal, integument and psychiatric systems are negative, except as otherwise noted.        Objective    /76   Pulse 78   Temp 97.3  F (36.3  C) (Tympanic)   Resp 14   Ht 1.88 m (6' 2\")   Wt 122 kg (269 lb)   SpO2 98%   BMI 34.54 kg/m    Body mass index is 34.54 kg/m .      Physical Exam   {Exam List (Optional):993760}          Signed Electronically by: Jai Cancino PA-C  {Email feedback regarding this note to [email or web address]   :[id number]}    "

## 2024-05-03 ENCOUNTER — OFFICE VISIT (OUTPATIENT)
Dept: FAMILY MEDICINE | Facility: CLINIC | Age: 54
End: 2024-05-03
Payer: COMMERCIAL

## 2024-05-03 VITALS
BODY MASS INDEX: 34.52 KG/M2 | RESPIRATION RATE: 14 BRPM | HEART RATE: 78 BPM | SYSTOLIC BLOOD PRESSURE: 134 MMHG | TEMPERATURE: 97.3 F | OXYGEN SATURATION: 98 % | DIASTOLIC BLOOD PRESSURE: 76 MMHG | HEIGHT: 74 IN | WEIGHT: 269 LBS

## 2024-05-03 DIAGNOSIS — Z00.00 ROUTINE HISTORY AND PHYSICAL EXAMINATION OF ADULT: Primary | ICD-10-CM

## 2024-05-03 DIAGNOSIS — Z12.11 SCREEN FOR COLON CANCER: ICD-10-CM

## 2024-05-03 DIAGNOSIS — I10 ESSENTIAL HYPERTENSION: ICD-10-CM

## 2024-05-03 DIAGNOSIS — E78.3 MIXED HYPERGLYCERIDEMIA: ICD-10-CM

## 2024-05-03 DIAGNOSIS — R63.5 WEIGHT GAIN: ICD-10-CM

## 2024-05-03 PROBLEM — Z71.89 ADVANCED DIRECTIVES, COUNSELING/DISCUSSION: Status: ACTIVE | Noted: 2024-05-03

## 2024-05-03 PROCEDURE — 80061 LIPID PANEL: CPT | Performed by: PHYSICIAN ASSISTANT

## 2024-05-03 PROCEDURE — 80048 BASIC METABOLIC PNL TOTAL CA: CPT | Performed by: PHYSICIAN ASSISTANT

## 2024-05-03 PROCEDURE — 99396 PREV VISIT EST AGE 40-64: CPT | Performed by: PHYSICIAN ASSISTANT

## 2024-05-03 PROCEDURE — 99214 OFFICE O/P EST MOD 30 MIN: CPT | Mod: 25 | Performed by: PHYSICIAN ASSISTANT

## 2024-05-03 PROCEDURE — 36415 COLL VENOUS BLD VENIPUNCTURE: CPT | Performed by: PHYSICIAN ASSISTANT

## 2024-05-03 RX ORDER — TOPIRAMATE 25 MG/1
25 TABLET, FILM COATED ORAL 2 TIMES DAILY
Qty: 180 TABLET | Refills: 1 | Status: SHIPPED | OUTPATIENT
Start: 2024-05-03 | End: 2024-09-25

## 2024-05-03 RX ORDER — ATORVASTATIN CALCIUM 20 MG/1
20 TABLET, FILM COATED ORAL DAILY
Qty: 90 TABLET | Refills: 3 | Status: SHIPPED | OUTPATIENT
Start: 2024-05-03

## 2024-05-03 RX ORDER — LOSARTAN POTASSIUM 100 MG/1
100 TABLET ORAL DAILY
Qty: 90 TABLET | Refills: 3 | Status: SHIPPED | OUTPATIENT
Start: 2024-05-03

## 2024-05-03 ASSESSMENT — PAIN SCALES - GENERAL: PAINLEVEL: NO PAIN (0)

## 2024-05-03 NOTE — PROGRESS NOTES
"Preventive Care Visit  [facility]  Jai Cancino PA-C, Physician Assistant - Medical  May 3, 2024      Assessment & Plan     Routine history and physical examination of adult  Completed  Sitting long hours in truck daily.  Unable to work out often due to back pain. Push no/low impact exercises    Essential hypertension  Stable   - Basic metabolic panel  (Ca, Cl, CO2, Creat, Gluc, K, Na, BUN); Future  - losartan (COZAAR) 100 MG tablet; Take 1 tablet (100 mg) by mouth daily  Refill meds, no changes    Weight gain  Needing some appetite suppression  - topiramate (TOPAMAX) 25 MG tablet; Take 1 tablet (25 mg) by mouth 2 times daily  Would not want to use stimulant, does not want GLP medication. Trial topamax     Mixed hyperglyceridemia  Stable   - Lipid panel reflex to direct LDL Fasting; Future  - atorvastatin (LIPITOR) 20 MG tablet; Take 1 tablet (20 mg) by mouth daily  Refill med, no changes    Screen for colon cancer  Ordered   - Colonoscopy Screening  Referral; Future            BMI  Estimated body mass index is 34.54 kg/m  as calculated from the following:    Height as of this encounter: 1.88 m (6' 2\").    Weight as of this encounter: 122 kg (269 lb).   Weight management plan: Discussed healthy diet and exercise guidelines      Follow up yearly for prevent visit     Heike Spain is a 54 year old, presenting for the following:  Hypertension and Lipids        5/3/2024     6:52 AM   Additional Questions   Roomed by Eli Benitez MA   Accompanied by Self        Health Care Directive  Patient does not have a Health Care Directive or Living Will:     HPI        Today's PHQ-2 Score:       5/3/2024     6:50 AM   PHQ-2 ( 1999 [employer])   Q1: Little interest or pleasure in doing things 1   Q2: Feeling down, depressed or hopeless 1   PHQ-2 Score 2   Q1: Little interest or pleasure in doing things Several days   Q2: Feeling down, depressed or hopeless Several days   PHQ-2 Score 2         " "    Social History     Tobacco Use    Smoking status: Former     Types: Cigars     Quit date: 10/23/2015     Years since quittin.5    Smokeless tobacco: Former     Types: Snuff   Vaping Use    Vaping status: Never Used   Substance Use Topics    Alcohol use: Yes     Comment: weekends 3-4 a day    Drug use: No       Last PSA: No results found for: \"PSA\"  ASCVD Risk   The 10-year ASCVD risk score (Jl FRANCO, et al., 2019) is: 12.1%    Values used to calculate the score:      Age: 54 years      Sex: Male      Is Non- : No      Diabetic: No      Tobacco smoker: No      Systolic Blood Pressure: 134 mmHg      Is BP treated: Yes      HDL Cholesterol: 42 mg/dL      Total Cholesterol: 266 mg/dL           Reviewed and updated as needed this visit by Provider   Tobacco  Allergies  Meds  Problems  Med Hx  Surg Hx  Fam Hx            Past Medical History:   Diagnosis Date    High cholesterol     NO ACTIVE PROBLEMS      Past Surgical History:   Procedure Laterality Date    COLONOSCOPY  2020    1 polyp removed    COLONOSCOPY WITH CO2 INSUFFLATION N/A 2020    Procedure: COLONOSCOPY, WITH CO2 INSUFFLATION;  Surgeon: Kiel Velasco DO;  Location:  OR    COMBINED ESOPHAGOSCOPY, GASTROSCOPY, DUODENOSCOPY (EGD) WITH CO2 INSUFFLATION N/A 2020    Procedure: ESOPHAGOGASTRODUODENOSCOPY, WITH CO2 INSUFFLATION;  Surgeon: Kiel Velasco DO;  Location:  OR    ESOPHAGOSCOPY, GASTROSCOPY, DUODENOSCOPY (EGD), COMBINED N/A 2020    Procedure: Esophagogastroduodenoscopy, With Biopsy;  Surgeon: Kiel Velasco DO;  Location:  OR    SHOULDER SURGERY      R shoulder surgery     Lab work is in process  Labs reviewed in EPIC      Review of Systems  Constitutional, HEENT, cardiovascular, pulmonary, gi and gu systems are negative, except as otherwise noted.     Objective    Exam  /76   Pulse 78   Temp 97.3  F (36.3  C) (Tympanic)   Resp 14   Ht 1.88 m (6' 2\")   Wt 122 kg " "(269 lb)   SpO2 98%   BMI 34.54 kg/m     Estimated body mass index is 34.54 kg/m  as calculated from the following:    Height as of this encounter: 1.88 m (6' 2\").    Weight as of this encounter: 122 kg (269 lb).    Physical Exam  GENERAL: alert and no distress  EYES: Eyes grossly normal to inspection, PERRL and conjunctivae and sclerae normal  HENT: ear canals and TM's normal, nose and mouth without ulcers or lesions  NECK: no adenopathy, no asymmetry, masses, or scars  RESP: lungs clear to auscultation - no rales, rhonchi or wheezes  CV: regular rate and rhythm, normal S1 S2, no S3 or S4, no murmur, click or rub, no peripheral edema  ABDOMEN: soft, nontender, no hepatosplenomegaly, no masses and bowel sounds normal  MS: no gross musculoskeletal defects noted, no edema  SKIN: no suspicious lesions or rashes  PSYCH: mentation appears normal, affect normal/bright        Signed Electronically by: Jai Cancino PA-C    "

## 2024-05-04 LAB
ANION GAP SERPL CALCULATED.3IONS-SCNC: 11 MMOL/L (ref 7–15)
BUN SERPL-MCNC: 13.2 MG/DL (ref 6–20)
CALCIUM SERPL-MCNC: 9.4 MG/DL (ref 8.6–10)
CHLORIDE SERPL-SCNC: 103 MMOL/L (ref 98–107)
CHOLEST SERPL-MCNC: 189 MG/DL
CREAT SERPL-MCNC: 1.08 MG/DL (ref 0.67–1.17)
DEPRECATED HCO3 PLAS-SCNC: 25 MMOL/L (ref 22–29)
EGFRCR SERPLBLD CKD-EPI 2021: 81 ML/MIN/1.73M2
FASTING STATUS PATIENT QL REPORTED: YES
GLUCOSE SERPL-MCNC: 105 MG/DL (ref 70–99)
HDLC SERPL-MCNC: 48 MG/DL
LDLC SERPL CALC-MCNC: 116 MG/DL
NONHDLC SERPL-MCNC: 141 MG/DL
POTASSIUM SERPL-SCNC: 4.9 MMOL/L (ref 3.4–5.3)
SODIUM SERPL-SCNC: 139 MMOL/L (ref 135–145)
TRIGL SERPL-MCNC: 125 MG/DL

## 2024-06-17 PROBLEM — Z71.89 ADVANCED DIRECTIVES, COUNSELING/DISCUSSION: Status: RESOLVED | Noted: 2024-05-03 | Resolved: 2024-06-17

## 2024-06-21 ENCOUNTER — TELEPHONE (OUTPATIENT)
Dept: GASTROENTEROLOGY | Facility: CLINIC | Age: 54
End: 2024-06-21
Payer: COMMERCIAL

## 2024-06-21 NOTE — TELEPHONE ENCOUNTER
"Endoscopy Scheduling Screen    Have you had a positive Covid test in the last 14 days?  No    What is your communication preference for Instructions and/or Bowel Prep?   MyChart    What insurance is in the chart?  Other:  [employer]    Ordering/Referring Provider:   ERIK FENG        (If ordering provider performs procedure, schedule with ordering provider unless otherwise instructed. )    BMI: Estimated body mass index is 34.54 kg/m  as calculated from the following:    Height as of 5/3/24: 1.88 m (6' 2\").    Weight as of 5/3/24: 122 kg (269 lb).     Sedation Ordered  moderate sedation.   If patient BMI > 50 do not schedule in [facility].    If patient BMI > 45 do not schedule at [facility].    Are you taking methadone or Suboxone?  No    Have you had difficulties, pain, or discomfort during past endoscopy procedures?  No    Are you taking any prescription medications for pain 3 or more times per week?   NO, No RN review required.    Do you have a history of malignant hyperthermia?  No    (Females) Are you currently pregnant?   No     Have you been diagnosed or told you have pulmonary hypertension?   No    Do you have an LVAD?  No    Have you been told you have moderate to severe sleep apnea?  No    Have you been told you have COPD, asthma, or any other lung disease?  No    Do you have any heart conditions?  No     Have you ever had or are you waiting for an organ transplant?  No. Continue scheduling, no site restrictions.    Have you had a stroke or transient ischemic attack (TIA aka \"mini stroke\" in the last 6 months?   No    Have you been diagnosed with or been told you have cirrhosis of the liver?   No    Are you currently on dialysis?   No    Do you need assistance transferring?   No    BMI: Estimated body mass index is 34.54 kg/m  as calculated from the following:    Height as of 5/3/24: 1.88 m (6' 2\").    Weight as of 5/3/24: 122 kg (269 lb).     Is patients BMI > 40 and scheduling location [facility]?  No    Do you take an " injectable medication for weight loss or diabetes (excluding insulin)?  No    Do you take the medication Naltrexone?  No    Do you take blood thinners?  No       Prep   Are you currently on dialysis or do you have chronic kidney disease?  No    Do you have a diagnosis of diabetes?  No    Do you have a diagnosis of cystic fibrosis (CF)?  No    On a regular basis do you go 3 -5 days between bowel movements?  No    BMI > 40?  No    Preferred Pharmacy:        [id number] - [place] - [address] [address] [facility] [address]. [place]  [address] [address]. [place] [address]  Phone: [phone] Fax: [phone]      Final Scheduling Details     Procedure scheduled  Colonoscopy    Surgeon:  Jt     Date of procedure:  8/16     Pre-OP / PAC:   No - Not required for this site.    Location  [facility] - [facility] - Per order.    Sedation   Moderate Sedation - Per order.      Patient Reminders:   You will receive a call from a Nurse to review instructions and health history.  This assessment must be completed prior to your procedure.  Failure to complete the Nurse assessment may result in the procedure being cancelled.      On the day of your procedure, please designate an adult(s) who can drive you home stay with you for the next 24 hours. The medicines used in the exam will make you sleepy. You will not be able to drive.      You cannot take public transportation, ride share services, or non-medical taxi service without a responsible caregiver.  Medical transport services are allowed with the requirement that a responsible caregiver will receive you at your destination.  We require that drivers and caregivers are confirmed prior to your procedure.

## 2024-08-08 ENCOUNTER — TELEPHONE (OUTPATIENT)
Dept: GASTROENTEROLOGY | Facility: CLINIC | Age: 54
End: 2024-08-08
Payer: COMMERCIAL

## 2024-08-08 NOTE — TELEPHONE ENCOUNTER
Pre visit planning completed.      Procedure details:    Patient scheduled for Colonoscopy on 8/16/24.     Arrival time: 1335. Procedure time 1420    Facility location: [facility]; [address] Floor, [place] [address]. Check in location: 2nd Floor at Surgery desk.    Sedation type: Conscious sedation     Pre op exam needed? No.    Indication for procedure:   Screen for colon cancer          Chart review:     Electronic implanted devices? No    Recent diagnosis of diverticulitis within the last 6 weeks? No      Medication review:    Diabetic? No    Anticoagulants? No    Weight loss medication/injectable? No GLP 1 medications per patient's medication list.  RN will verify with pre-assessment call.    NSAIDS? No NSAID medications per patient's medication list.  RN will verify with pre-assessment call.    Other medication HOLDING recommendations:  N/A      Prep for procedure:     Bowel prep recommendation: Standard Miralax  Due to: standard bowel prep.    Prep instructions sent via [employer]         Corinne Kliber, RN  Endoscopy Procedure Pre Assessment RN  [phone] option 4

## 2024-08-08 NOTE — TELEPHONE ENCOUNTER
Pre assessment completed for upcoming procedure.   (Please see previous telephone encounter notes for complete details)      Procedure details:    Arrival time and facility location reviewed.    Pre op exam needed? No.    Designated  policy reviewed. Instructed to have someone stay 6  hours post procedure.       Medication review:    Medications reviewed. Please see supporting documentation below. Holding recommendations discussed (if applicable).       Prep for procedure:     Procedure prep instructions reviewed.        Any additional information needed:  N/A      Patient  verbalized understanding and had no questions or concerns at this time.      Opal Flowers RN  Endoscopy Procedure Pre Assessment   [phone] option 4

## 2024-08-16 ENCOUNTER — HOSPITAL ENCOUNTER (OUTPATIENT)
Facility: AMBULATORY SURGERY CENTER | Age: 54
Discharge: HOME | End: 2024-08-16
Attending: COLON & RECTAL SURGERY | Admitting: COLON & RECTAL SURGERY
Payer: COMMERCIAL

## 2024-08-16 VITALS
DIASTOLIC BLOOD PRESSURE: 75 MMHG | SYSTOLIC BLOOD PRESSURE: 112 MMHG | OXYGEN SATURATION: 96 % | HEART RATE: 66 BPM | RESPIRATION RATE: 16 BRPM | TEMPERATURE: 97.8 F

## 2024-08-16 LAB — COLONOSCOPY: NORMAL

## 2024-08-16 PROCEDURE — G8907 PT DOC NO EVENTS ON DISCHARG: HCPCS

## 2024-08-16 PROCEDURE — 88305 TISSUE EXAM BY PATHOLOGIST: CPT | Performed by: PATHOLOGY

## 2024-08-16 PROCEDURE — 45385 COLONOSCOPY W/LESION REMOVAL: CPT

## 2024-08-16 PROCEDURE — G8918 PT W/O PREOP ORDER IV AB PRO: HCPCS

## 2024-08-16 RX ORDER — NALOXONE HYDROCHLORIDE 0.4 MG/ML
0.2 INJECTION, SOLUTION INTRAMUSCULAR; INTRAVENOUS; SUBCUTANEOUS
Status: DISCONTINUED | OUTPATIENT
Start: 2024-08-16 | End: 2024-08-17 | Stop reason: HOSPADM

## 2024-08-16 RX ORDER — NALOXONE HYDROCHLORIDE 0.4 MG/ML
0.4 INJECTION, SOLUTION INTRAMUSCULAR; INTRAVENOUS; SUBCUTANEOUS
Status: DISCONTINUED | OUTPATIENT
Start: 2024-08-16 | End: 2024-08-17 | Stop reason: HOSPADM

## 2024-08-16 RX ORDER — LIDOCAINE 40 MG/G
CREAM TOPICAL
Status: DISCONTINUED | OUTPATIENT
Start: 2024-08-16 | End: 2024-08-17 | Stop reason: HOSPADM

## 2024-08-16 RX ORDER — FENTANYL CITRATE 50 UG/ML
INJECTION, SOLUTION INTRAMUSCULAR; INTRAVENOUS PRN
Status: DISCONTINUED | OUTPATIENT
Start: 2024-08-16 | End: 2024-08-16 | Stop reason: HOSPADM

## 2024-08-16 RX ORDER — FLUMAZENIL 0.1 MG/ML
0.2 INJECTION, SOLUTION INTRAVENOUS
Status: DISCONTINUED | OUTPATIENT
Start: 2024-08-16 | End: 2024-08-17 | Stop reason: HOSPADM

## 2024-08-16 RX ORDER — ONDANSETRON 2 MG/ML
4 INJECTION INTRAMUSCULAR; INTRAVENOUS
Status: DISCONTINUED | OUTPATIENT
Start: 2024-08-16 | End: 2024-08-17 | Stop reason: HOSPADM

## 2024-08-16 NOTE — H&P
Pre-Endoscopy History and Physical     Grabiel Cotto MRN# [id number]   YOB: 1970 Age: 54 year old     Date of Procedure: 8/16/2024  Primary care provider: Jai Cancino  Type of Endoscopy: colonoscopy  Reason for Procedure:Colonoscopy with possible biopsy, possible polypectomy  Type of Anesthesia Anticipated: Moderate (conscious) sedation    HPI:    Grabiel is a 54 year old male who will be undergoing the above procedure.      A history and physical has been performed. The patient's medications and allergies have been reviewed. The risks and benefits of the procedure and the sedation options and risks were discussed with the patient.  All questions were answered and informed consent was obtained.      He denies a personal or family history of anesthesia complications or bleeding disorders.     No Known Allergies     Current Outpatient Medications   Medication Sig Dispense Refill    atorvastatin (LIPITOR) 20 MG tablet Take 1 tablet (20 mg) by mouth daily 90 tablet 3    losartan (COZAAR) 100 MG tablet Take 1 tablet (100 mg) by mouth daily 90 tablet 3    topiramate (TOPAMAX) 25 MG tablet Take 1 tablet (25 mg) by mouth 2 times daily 180 tablet 1     Current Facility-Administered Medications   Medication Dose Route Frequency Provider Last Rate Last Admin    lidocaine (LMX4) kit   Topical Q1H PRN Katarina Waters MD        lidocaine 1 % 0.1-1 mL  0.1-1 mL Other Q1H PRN Katarina Waters MD        ondansetron (ZOFRAN) injection 4 mg  4 mg Intravenous Once PRN Katarina Waters MD        sodium chloride (PF) 0.9% PF flush 3 mL  3 mL Intracatheter Q8H Katarina Waters MD        sodium chloride (PF) 0.9% PF flush 3 mL  3 mL Intracatheter q1 min prn Katarina Waters MD           Patient Active Problem List   Diagnosis    Right shoulder pain    Supraspinatus tendon tear, right, initial encounter    Impingement syndrome of right shoulder    AC (acromioclavicular) joint  "arthritis    Complete tear of right rotator cuff    S/P right rotator cuff repair    Dark stools     novel coronavirus disease (COVID-19)    Mixed hyperglyceridemia    Essential hypertension        Past Medical History:   Diagnosis Date    High cholesterol     NO ACTIVE PROBLEMS         Past Surgical History:   Procedure Laterality Date    COLONOSCOPY  2020    1 polyp removed    COLONOSCOPY WITH CO2 INSUFFLATION N/A 2020    Procedure: COLONOSCOPY, WITH CO2 INSUFFLATION;  Surgeon: Kiel Velasco DO;  Location: [facility]    COMBINED ESOPHAGOSCOPY, GASTROSCOPY, DUODENOSCOPY (EGD) WITH CO2 INSUFFLATION N/A 2020    Procedure: ESOPHAGOGASTRODUODENOSCOPY, WITH CO2 INSUFFLATION;  Surgeon: Kiel Velasco DO;  Location: [facility]    ESOPHAGOSCOPY, GASTROSCOPY, DUODENOSCOPY (EGD), COMBINED N/A 2020    Procedure: Esophagogastroduodenoscopy, With Biopsy;  Surgeon: Kiel Velasco DO;  Location: [facility]    SHOULDER SURGERY      R shoulder surgery       Social History     Tobacco Use    Smoking status: Former     Types: Cigars     Quit date: 10/23/2015     Years since quittin.8    Smokeless tobacco: Former     Types: Snuff   Substance Use Topics    Alcohol use: Yes     Comment: weekends 3-4 a day       Family History   Problem Relation Age of Onset    Cancer Father     Dementia Maternal Grandfather     Cancer Maternal Grandfather     Cancer Paternal Grandfather        REVIEW OF SYSTEMS:     5 point ROS negative except as noted above in HPI, including Gen., Resp., CV, GI &  system review.      PHYSICAL EXAM:   /74   Temp 97.8  F (36.6  C) (Temporal)   Resp 16   SpO2 95%  Estimated body mass index is 34.54 kg/m  as calculated from the following:    Height as of 5/3/24: 1.88 m (6' 2\").    Weight as of 5/3/24: 122 kg (269 lb).   GENERAL APPEARANCE: healthy  MENTAL STATUS: alert and oriented x 3  AIRWAY EXAM: Mallampatti Class II (visualization of the soft palate, fauces, and uvula)  RESP: lungs " clear to auscultation - no rales, rhonchi or wheezes  CV: regular rates and rhythm and normal S1 S2, no S3 or S4      DIAGNOSTICS:    Not indicated      IMPRESSION   ASA Class 2 - Mild systemic disease        PLAN:   Colonoscopy with possible biopsy, possible polypectomy.    The above has been forwarded to the consulting provider.      Signed Electronically by: Katarina Waters MD  August 16, 2024    .

## 2024-08-20 LAB
PATH REPORT.COMMENTS IMP SPEC: NORMAL
PATH REPORT.COMMENTS IMP SPEC: NORMAL
PATH REPORT.FINAL DX SPEC: NORMAL
PATH REPORT.GROSS SPEC: NORMAL
PATH REPORT.MICROSCOPIC SPEC OTHER STN: NORMAL
PATH REPORT.RELEVANT HX SPEC: NORMAL
PHOTO IMAGE: NORMAL

## 2024-08-30 ENCOUNTER — PATIENT OUTREACH (OUTPATIENT)
Dept: GASTROENTEROLOGY | Facility: CLINIC | Age: 54
End: 2024-08-30
Payer: COMMERCIAL

## 2024-09-20 ENCOUNTER — HOSPITAL ENCOUNTER (EMERGENCY)
Facility: CLINIC | Age: 54
Discharge: HOME | End: 2024-09-20
Attending: FAMILY MEDICINE | Admitting: FAMILY MEDICINE
Payer: OTHER MISCELLANEOUS

## 2024-09-20 ENCOUNTER — APPOINTMENT (OUTPATIENT)
Dept: GENERAL RADIOLOGY | Facility: CLINIC | Age: 54
End: 2024-09-20
Attending: FAMILY MEDICINE
Payer: OTHER MISCELLANEOUS

## 2024-09-20 VITALS
HEART RATE: 86 BPM | OXYGEN SATURATION: 99 % | WEIGHT: 251 LBS | TEMPERATURE: 98.1 F | RESPIRATION RATE: 16 BRPM | SYSTOLIC BLOOD PRESSURE: 171 MMHG | BODY MASS INDEX: 31.21 KG/M2 | HEIGHT: 75 IN | DIASTOLIC BLOOD PRESSURE: 97 MMHG

## 2024-09-20 DIAGNOSIS — S56.919A STRAIN OF FOREARM, UNSPECIFIED LATERALITY, INITIAL ENCOUNTER: ICD-10-CM

## 2024-09-20 DIAGNOSIS — S70.02XA CONTUSION OF LEFT HIP, INITIAL ENCOUNTER: ICD-10-CM
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
## 2024-09-20 PROCEDURE — 99283 EMERGENCY DEPT VISIT LOW MDM: CPT | Performed by: FAMILY MEDICINE

## 2024-09-20 PROCEDURE — 73502 X-RAY EXAM HIP UNI 2-3 VIEWS: CPT

## 2024-09-20 ASSESSMENT — ACTIVITIES OF DAILY LIVING (ADL)
ADLS_ACUITY_SCORE: 35
ADLS_ACUITY_SCORE: 35

## 2024-09-20 ASSESSMENT — COLUMBIA-SUICIDE SEVERITY RATING SCALE - C-SSRS
6. HAVE YOU EVER DONE ANYTHING, STARTED TO DO ANYTHING, OR PREPARED TO DO ANYTHING TO END YOUR LIFE?: NO
2. HAVE YOU ACTUALLY HAD ANY THOUGHTS OF KILLING YOURSELF IN THE PAST MONTH?: NO
1. IN THE PAST MONTH, HAVE YOU WISHED YOU WERE DEAD OR WISHED YOU COULD GO TO SLEEP AND NOT WAKE UP?: NO

## 2024-09-20 NOTE — DISCHARGE INSTRUCTIONS
Avoid heavy lifting, pushing, pulling.  Expect to be more stiff and sore in the next 2 to 3 days and then rapidly improved.  You may use ibuprofen 400 to 600 mg 4 times per day if needed for pain.  You may add acetaminophen 1000 mg 4 times per day if needed for pain.  Be seen if you are not mostly better in 2 weeks or if you have new or worsening symptoms at any time.
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
## 2024-09-20 NOTE — LETTER
September 20, 2024      To Whom It May Concern:      Grabiel Cotto was seen in our Emergency Department today, 09/20/24.  Please excuse him from work September 20 through 23, 2024, due to an acute medical problem.    Sincerely,        Vincent Santos MD
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
## 2024-09-24 ENCOUNTER — TELEPHONE (OUTPATIENT)
Dept: FAMILY MEDICINE | Facility: CLINIC | Age: 54
End: 2024-09-24
Payer: COMMERCIAL

## 2024-09-24 NOTE — TELEPHONE ENCOUNTER
Attempted to reach pt to complete hospital follow-up call. There was no answer. Left message to return call to a nurse at [phone].    If pt returns call please complete the following:    Please complete Post Discharge Assessment questionnaire found in Screenings tab.      2.  After completing Post Discharge Assessment questionnaire, please enter the following dot phrase and to complete note (.[email or web address]):      ISABELL LopezN, RN
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
## 2024-09-25 ENCOUNTER — OFFICE VISIT (OUTPATIENT)
Dept: FAMILY MEDICINE | Facility: CLINIC | Age: 54
End: 2024-09-25
Payer: OTHER MISCELLANEOUS

## 2024-09-25 VITALS
OXYGEN SATURATION: 97 % | SYSTOLIC BLOOD PRESSURE: 147 MMHG | RESPIRATION RATE: 18 BRPM | TEMPERATURE: 97.9 F | DIASTOLIC BLOOD PRESSURE: 88 MMHG | BODY MASS INDEX: 32.75 KG/M2 | WEIGHT: 262 LBS | HEART RATE: 86 BPM

## 2024-09-25 DIAGNOSIS — F41.8 SITUATIONAL ANXIETY: ICD-10-CM
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
## 2024-09-25 DIAGNOSIS — M77.01 BILATERAL MEDIAL EPICONDYLITIS OF ELBOW JOINT: ICD-10-CM

## 2024-09-25 DIAGNOSIS — M77.02 BILATERAL MEDIAL EPICONDYLITIS OF ELBOW JOINT: ICD-10-CM

## 2024-09-25 DIAGNOSIS — S70.02XD CONTUSION OF LEFT HIP, SUBSEQUENT ENCOUNTER: ICD-10-CM

## 2024-09-25 PROCEDURE — 99214 OFFICE O/P EST MOD 30 MIN: CPT | Performed by: PHYSICIAN ASSISTANT

## 2024-09-25 ASSESSMENT — PAIN SCALES - GENERAL: PAINLEVEL: EXTREME PAIN (9)
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
## 2024-10-10 ENCOUNTER — OFFICE VISIT (OUTPATIENT)
Dept: FAMILY MEDICINE | Facility: CLINIC | Age: 54
End: 2024-10-10
Payer: OTHER MISCELLANEOUS

## 2024-10-10 VITALS
DIASTOLIC BLOOD PRESSURE: 88 MMHG | TEMPERATURE: 97.2 F | HEIGHT: 74 IN | RESPIRATION RATE: 16 BRPM | SYSTOLIC BLOOD PRESSURE: 146 MMHG | BODY MASS INDEX: 33.75 KG/M2 | WEIGHT: 263 LBS | OXYGEN SATURATION: 98 % | HEART RATE: 91 BPM
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
## 2024-10-10 DIAGNOSIS — M77.02 BILATERAL MEDIAL EPICONDYLITIS OF ELBOW JOINT: Primary | ICD-10-CM

## 2024-10-10 DIAGNOSIS — M77.01 BILATERAL MEDIAL EPICONDYLITIS OF ELBOW JOINT: Primary | ICD-10-CM

## 2024-10-10 PROCEDURE — 99213 OFFICE O/P EST LOW 20 MIN: CPT | Performed by: PHYSICIAN ASSISTANT

## 2024-10-10 ASSESSMENT — PAIN SCALES - GENERAL: PAINLEVEL: SEVERE PAIN (7)
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
## 2024-10-11 ENCOUNTER — TELEPHONE (OUTPATIENT)
Dept: FAMILY MEDICINE | Facility: CLINIC | Age: 54
End: 2024-10-11
Payer: COMMERCIAL

## 2024-10-11 ENCOUNTER — THERAPY VISIT (OUTPATIENT)
Dept: PHYSICAL THERAPY | Facility: CLINIC | Age: 54
End: 2024-10-11
Attending: PHYSICIAN ASSISTANT
Payer: OTHER MISCELLANEOUS

## 2024-10-11 DIAGNOSIS — M77.02 BILATERAL MEDIAL EPICONDYLITIS OF ELBOW JOINT: ICD-10-CM

## 2024-10-11 DIAGNOSIS — M77.01 BILATERAL MEDIAL EPICONDYLITIS OF ELBOW JOINT: Primary | ICD-10-CM

## 2024-10-11 DIAGNOSIS — M77.01 BILATERAL MEDIAL EPICONDYLITIS OF ELBOW JOINT: ICD-10-CM

## 2024-10-11 DIAGNOSIS — M77.02 BILATERAL MEDIAL EPICONDYLITIS OF ELBOW JOINT: Primary | ICD-10-CM
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
## 2024-10-11 PROCEDURE — 97110 THERAPEUTIC EXERCISES: CPT | Mod: GP

## 2024-10-11 PROCEDURE — 97530 THERAPEUTIC ACTIVITIES: CPT | Mod: GP

## 2024-10-11 PROCEDURE — 97161 PT EVAL LOW COMPLEX 20 MIN: CPT | Mod: GP

## 2024-10-11 ASSESSMENT — ACTIVITIES OF DAILY LIVING (ADL)
LAUNDERING_CLOTHES: QUITE A BIT OF DIFFICULTY
DRESS: MODERATE DIFFICULTY
YOUR_USUAL_HOBBIES,_RECREATIONAL_OR_SPORTING_ACTIVITIES: QUITE A BIT OF DIFFICULTY
CARRYING_A_SMALL_SUITCASE_WITH_YOUR_AFFECTED_LIMB: EXTREME DIFFICULTY
CLEANING: QUITE A BIT OF DIFFICULTY
TYING_OR_LACING_SHOES: MODERATE DIFFICULTY
SLEEPING: EXTREME DIFFICULTY
UEFI_TOTAL_SCORE: 21
PUSHING_UP_ON_YOUR_HANDS: QUITE A BIT OF DIFFICULTY
USING_TOOLS_OR_APPLIANCES: QUITE A BIT OF DIFFICULTY
PLACING_AN_OBJECT_ONTO,_OR_REMOVING_IT_FROM_AN_OVERHEAD_SHELF: MODERATE DIFFICULTY
DRIVING: QUITE A BIT OF DIFFICULTY
DOING_UP_BUTTONS: MODERATE DIFFICULTY
LIFTING_A_BAG_OF_GROCERIES_TO_WAIST_LEVEL: QUITE A BIT OF DIFFICULTY
ANY_OF_YOUR_USUAL_WORK,_HOUSEWORK_OR_SCHOOL_ACTIVITIES: QUITE A BIT OF DIFFICULTY
UEFI_TOTAL_SCORE/80: .26
THROWING_A_BALL: EXTREME DIFFICULTY
VACUUMING,_SWEEPING,_OR_RAKING: QUITE A BIT OF DIFFICULTY
WASHING_YOUR_HAIR_OR_SCALP: MODERATE DIFFICULTY
OPENING_DOORS: QUITE A BIT OF DIFFICULTY
PREPARING_FOOD: MODERATE DIFFICULTY
OPENING_A_JAR: EXTREME DIFFICULTY
PLEASE_INDICATE_YOR_PRIMARY_REASON_FOR_REFERRAL_TO_THERAPY:: ELBOW
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
## 2024-10-11 NOTE — TELEPHONE ENCOUNTER
Pt called in to get clarification. He was given ref for PT and a ref for Ortho, by KK. He can get in with PT today @ 230 today and can't get in with ortho until 10/21. Pt wanted to be sure it is ok to start PT today or does he need to see ortho first before starting PT    Pt would like a call back ASAP so he know if he needs to cancel hit PT appt for today or if it is ok to still go. Please call back back  [phone]- ok to LV if unable to answer.

## 2024-10-14 NOTE — TELEPHONE ENCOUNTER
Spoke with patient and informed his OT has been scheduled already on 10/22/24.  He sees an orthopedic provider on 10/21/24 at [facility].  Lucie ROBERTSON    [facility]

## 2024-10-14 NOTE — TELEPHONE ENCOUNTER
October 13, 2024  I got a message from Physical Therapy and they did an assessment and felt that Occupational Therapy would be a better fit for him.   I will place the order to transition to Occupational Therapy.   Please call Nish and let him know that I made this change for him. He can start before he gets in with Orthopedics.   Kristen Kehr PA-C
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
## 2024-10-21 ENCOUNTER — OFFICE VISIT (OUTPATIENT)
Dept: ORTHOPEDICS | Facility: CLINIC | Age: 54
End: 2024-10-21
Attending: PHYSICIAN ASSISTANT
Payer: OTHER MISCELLANEOUS

## 2024-10-21 ENCOUNTER — ANCILLARY PROCEDURE (OUTPATIENT)
Dept: GENERAL RADIOLOGY | Facility: CLINIC | Age: 54
End: 2024-10-21
Attending: PEDIATRICS
Payer: OTHER MISCELLANEOUS

## 2024-10-21 VITALS — WEIGHT: 263 LBS | BODY MASS INDEX: 33.75 KG/M2 | HEIGHT: 74 IN

## 2024-10-21 DIAGNOSIS — S59.901A ELBOW INJURY, RIGHT, INITIAL ENCOUNTER: ICD-10-CM

## 2024-10-21 DIAGNOSIS — M77.02 BILATERAL MEDIAL EPICONDYLITIS OF ELBOW JOINT: ICD-10-CM

## 2024-10-21 DIAGNOSIS — S59.902A ELBOW INJURY, LEFT, INITIAL ENCOUNTER: Primary | ICD-10-CM

## 2024-10-21 DIAGNOSIS — M77.01 BILATERAL MEDIAL EPICONDYLITIS OF ELBOW JOINT: ICD-10-CM

## 2024-10-21 PROCEDURE — 99244 OFF/OP CNSLTJ NEW/EST MOD 40: CPT | Performed by: PEDIATRICS

## 2024-10-21 PROCEDURE — 73080 X-RAY EXAM OF ELBOW: CPT | Mod: TC | Performed by: RADIOLOGY
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
## 2024-10-22 ENCOUNTER — THERAPY VISIT (OUTPATIENT)
Dept: OCCUPATIONAL THERAPY | Facility: CLINIC | Age: 54
End: 2024-10-22
Attending: PHYSICIAN ASSISTANT
Payer: OTHER MISCELLANEOUS
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
## 2024-10-22 DIAGNOSIS — M77.01 BILATERAL MEDIAL EPICONDYLITIS OF ELBOW JOINT: ICD-10-CM

## 2024-10-22 DIAGNOSIS — M77.02 BILATERAL MEDIAL EPICONDYLITIS OF ELBOW JOINT: ICD-10-CM

## 2024-10-22 PROCEDURE — 97530 THERAPEUTIC ACTIVITIES: CPT | Mod: GO

## 2024-10-22 PROCEDURE — 97760 ORTHOTIC MGMT&TRAING 1ST ENC: CPT | Mod: GO

## 2024-10-22 PROCEDURE — 97165 OT EVAL LOW COMPLEX 30 MIN: CPT | Mod: GO

## 2024-10-22 NOTE — PROGRESS NOTES
OCCUPATIONAL THERAPY EVALUATION  Type of Visit: Evaluation     Fall Risk Screen:  Fall screen completed by: OT  Have you fallen 2 or more times in the past year?: No  Have you fallen and had an injury in the past year?: No  Is patient a fall risk?: No    Subjective      Presenting condition or subjective complaint: forearm elbow pain  Date of onset: 09/20/24    Past Medical History:   Diagnosis Date    High cholesterol     NO ACTIVE PROBLEMS      Past Surgical History:   Procedure Laterality Date    COLONOSCOPY  09/25/2020    1 polyp removed    COLONOSCOPY N/A 8/16/2024    Procedure: COLONOSCOPY, FLEXIBLE, WITH LESION REMOVAL USING SNARE;  Surgeon: Katarina Waters MD;  Location: [facility]    COLONOSCOPY WITH CO2 INSUFFLATION N/A 9/25/2020    Procedure: COLONOSCOPY, WITH CO2 INSUFFLATION;  Surgeon: Kiel Velasco DO;  Location: [facility]    COLONOSCOPY WITH CO2 INSUFFLATION N/A 8/16/2024    Procedure: Colonoscopy with CO2 insufflation;  Surgeon: Katarina Waters MD;  Location: [facility]    COMBINED ESOPHAGOSCOPY, GASTROSCOPY, DUODENOSCOPY (EGD) WITH CO2 INSUFFLATION N/A 9/25/2020    Procedure: ESOPHAGOGASTRODUODENOSCOPY, WITH CO2 INSUFFLATION;  Surgeon: Kiel Velasco DO;  Location: [facility]    ESOPHAGOSCOPY, GASTROSCOPY, DUODENOSCOPY (EGD), COMBINED N/A 9/25/2020    Procedure: Esophagogastroduodenoscopy, With Biopsy;  Surgeon: Kiel Velasco DO;  Location: [facility]    SHOULDER SURGERY      R shoulder surgery     Prior diagnostic imaging/testing results: X-ray   Next week will have MRI  Prior therapy history for the same diagnosis, illness or injury: No      Prior Level of Function  Transfers: Independent  Ambulation: Independent  ADL: Independent  IADL: Driving, Finances, Housekeeping, Laundry, Meal preparation, Medication management, Work, Yard work    Living Environment  Social support: With a significant other or spouse   Type of home: House   Ramp: No   Stairs inside the home: Yes   Is there a railing:  Yes     Help at home: Other    Employment: Yes   Hobbies/Interests: none    Patient goals for therapy: my job    Pain assessment: Pain present     Objective   ADDITIONAL HISTORY:  Right hand dominant  Patient reports symptoms of pain, weakness/loss of strength, edema, numbness, and tingling   Transportation: drives  Currently not working due to present treatment problem    Functional Outcome Measure:   Upper Extremity Functional Index Score:  SCORE:   Column Totals: /80: 22   (A lower score indicates greater disability.)    PAIN:  Pain Level at Rest: 2/10  Pain Level with Use: 6/10  Pain Location: medial parts of elbow, above and below medial epicondyles.  Pain Quality: Numb, Shooting, Tingling, and tangled  Pain Frequency: constant  Pain is Worst: daytime or nighttime  Pain is Exacerbated By: driving, sleeping, picking things up, movement  Pain is Relieved By: cold, otc medications, and Sitting with hands on knees  Pain Progression: R may be a bit better, L feels weaker    POSTURE: Forward Neck Posture and Rounded Forward Shoulders     SENSATION: Decreased Ulnar Nerve distribution per pt report     ROM:   Wrist ROM  Left AROM Right AROM    Extension 60 59   Flexion 56 68   Radial Deviation (RD) 20 15   Ulnar Deviation (UD) 22 28   Supination 82 80   Pronation 73 70     NEURAL TENSION TESTING: UNT: Ulnar Neurodynamic Test (based on DS Ivone's ULNT)   10/22/2024   0-5 Scale L: 3 R: 3   Position:   0/5: Arm across abdomen in coronal plane  1/5: ER to neutral ABD shoulder to 45 degrees  2/5: ER shoulder to 90 degrees  3/5: Block shoulder and ABD shoulder to 110 degrees  4/5: Fully pronate forearm, extend wrist and ring and small fingers  5/5: Flex elbow and bring hand to side of face  Notes:  (+) indicates beyond grade level but less than longterm to next level  (-) indicates over longterm to level  S1 onset/change of patient's symptoms  S2 definite stop point based on patient's discomfort  level    RESISTED TESTING: Resisted Testing (pain report)   Left Right   Elbow Extension 5+ 5   Elbow Flexion 5+ 5   Supination  5 5   Pronation 5 5   Wrist Ext with RD, Elbow at side 5+ 5   Wrist Ext with UD, Elbow at side 5 5   Wrist Ext with RD, Elbow Ext - 5+   Wrist Ext with UD, Elbow Ext - 5+   Wrist Flex with RD, Elbow at side 5+ 5   Wrist Flex with UD, Elbow at side 5+ 5   Wrist Flex with RD, Elbow Ext - 5   Wrist Flex with UD, Elbow Ext - 5   FDS 5 5      STRENGTH:     Measured in pounds 10/22/2024 10/22/2024    Left Right   Trial 1 60 55     PALPATION:   Elbow Palpation Left Right   Distal Bicep Tendon ++ -   Medial Epicondyle +++ ++   Flexor Origin +++ -   Flexor Wad ++ -   Pronator Teres + - numbness in fingers -     Ulnar Nerve Palpation Left Right   Cubital Tunnel +++ ++   [place] - -     Assessment & Plan   CLINICAL IMPRESSIONS  Medical Diagnosis: B MEP    Treatment Diagnosis: B MEP    Impression/Assessment: Pt is a 54 year old male presenting to Occupational Therapy due to B MEP.  The following significant findings have been identified: Impaired ROM, Impaired sensation, Impaired strength, and Pain.  These identified deficits interfere with their ability to perform self care tasks, work tasks, recreational activities, household chores, driving ,  yard work, and meal planning and preparation as compared to previous level of function.   Patient's limitations or Problem List includes: Pain, Decreased ROM/motion, Increased edema, Weakness, Sensory disturbance, Adherent scarring, Decreased stability, Decreased , Decreased pinch, Tightness in musculature, and Adherence in connective tissue of the bilateral elbow which interferes with the patient's ability to perform Self Care Tasks (dressing, hygiene/toileting), Work Tasks, Sleep Patterns, Recreational Activities, Household Chores, and Driving  as compared to previous level of function.    Clinical Decision Making (Complexity):  Assessment of  Occupational Performance: 5 or more Performance Deficits  Occupational Performance Limitations: dressing, hygiene and grooming, driving and community mobility, health management and maintenance, home establishment and management, meal preparation and cleanup, shopping, sleep, work, and leisure activities  Clinical Decision Making (Complexity): Low complexity    PLAN OF CARE  Treatment Interventions:  Modalities:  US and Paraffin  Therapeutic Exercise:  AROM, AAROM, PROM, Tendon Gliding, Blocking, Reverse Blocking, Place and Hold, Contract Relax, Extensor Tracking, Isotonics, Isometrics, and Stabilization  Neuromuscular re-education:  Nerve Gliding, Proprioceptive Training, Posture, Kinesiotaping, Isometrics, and Stabilization  Manual Techniques:  Joint mobilization, Scar mobilization, Friction massage, Myofascial release, and Manual edema mobilization  Orthotic Fabrication:  Static, Forearm based, and Long arm  Self Care:  Self Care Tasks, Ergonomic Considerations, and Work Tasks    Long Term Goals   OT Goal 1  Goal Identifier: Lifting  Goal Description: Pt will report no pain when lifting a grocery bag to waist level  Rationale: In order to maximize safety and independence with ADL/IADLs  Goal Progress: 6/10 pain  Target Date: 01/14/25      Frequency of Treatment: 1x weekly, tapering  Duration of Treatment: 12 weeks     Education Assessment: Learner/Method: Patient;Demonstration;Pictures/Video  Education Comments: PTRX via printout     Risks and benefits of evaluation/treatment have been explained.   Patient/Family/caregiver agrees with Plan of Care.     Evaluation Time:    OT Eval, Low Complexity Minutes (51794): 25  Signing Clinician: Crystal Mock OT

## 2024-10-29 ENCOUNTER — HOSPITAL ENCOUNTER (OUTPATIENT)
Dept: MRI IMAGING | Facility: CLINIC | Age: 54
Discharge: HOME | End: 2024-10-29
Attending: PEDIATRICS
Payer: OTHER MISCELLANEOUS

## 2024-10-29 DIAGNOSIS — S59.901A ELBOW INJURY, RIGHT, INITIAL ENCOUNTER: ICD-10-CM

## 2024-10-29 DIAGNOSIS — S59.902A ELBOW INJURY, LEFT, INITIAL ENCOUNTER: ICD-10-CM

## 2024-10-29 PROCEDURE — 73221 MRI JOINT UPR EXTREM W/O DYE: CPT | Mod: 26 | Performed by: RADIOLOGY

## 2024-10-29 PROCEDURE — 73221 MRI JOINT UPR EXTREM W/O DYE: CPT | Mod: LT

## 2024-10-31 NOTE — PROGRESS NOTES
ASSESSMENT & PLAN    Nish was seen today for pain, follow up, pain and follow up.    Diagnoses and all orders for this visit:    Bilateral medial epicondylitis of elbow joint    Elbow injury, right, subsequent encounter    Elbow injury, left, subsequent encounter      He is relieved no surgery required for this.  I think continuing with therapy is a good approach.  Additional considerations around steroid injection (advise with US guidance), possibly PRP (again, with US guidance), possibly tenotomy may be considered in future, depending on course with therapy.  See below.  Questions answered. Discussed signs and symptoms that may indicate more serious issues; the patient was instructed to seek appropriate care if noted. Nish indicates understanding of these issues and agrees with the plan.      See Patient Instructions  Patient Instructions   Reviewed bilateral elbow MRI scans, demonstrating findings consistent with medial epicondylitis, or irritation and change at the medial elbow flexor tendons.  Incidentally, there is also some irregularity noted in the lateral elbow tendons as well.  We discussed continuing with activity modification, with updated work letter today.  Restrictions will remain in place through recheck, anticipated approximately 1 month.  Discussed continuing with hand therapy, and you can discuss with therapist whether additional more frequent visits may be beneficial.  Okay to continue with the supports that you have, including the forearm straps and the left wrist splint.  Also okay to try weaning some level of support for comfort, focusing on using devices more with activities if needed.  We discussed injections options, including one-time trial of imaging-guided steroid injection, or potential trial of PRP injection with imaging guidance.  Defer these for now in favor of continuing therapy, though you can discuss at home and consider if desired.  Also discussed potential for discussion with one  of my colleagues about tenotomy with Tenex.  For now, continue with therapy and recheck approximately 1 month.  Contact clinic or follow-up sooner if needed.    If you have any further questions for your physician or physician s care team you can contact them thru MyChart or by calling [phone].      Donald Bills, [facility]    SUBJECTIVE- Interim History October 31, 2024    Chief Complaint   Patient presents with    Left Elbow - Pain, Follow Up    Right Elbow - Pain, Follow Up       Grabiel Cotto is a 54 year old male who is seen in f/u up for    Elbow injury, right, subsequent encounter  Elbow injury, left, subsequent encounter. Since last visit on 10/21/24 patient has felt no difference with the elbows, is wearing the brace given from OT with the wrist on the left, as well as bilateral tennis elbow braces. Reports that pain is still located along flexor tendon origin and into forearm. Has one visit with OT in interim, next visit scheduled for 11/12/24.    Worker's comp injury     The patient is seen by themselves.  The patient is Right handed    Orthopedic/Surgical history: NO  Social History/Occupation:  for [employer]      REVIEW OF SYSTEMS:  Review of Systems    OBJECTIVE:       Wearing bilateral forearm strap and left forearm/wrist orthosis  Pain elbows with active motion bilaterally    RADIOLOGY:  Final results and radiologist's interpretation, available in the [facility] health record.  Images were reviewed with the patient in the office today.  My personal interpretation of the performed imaging: medial elbow tendinosis bilaterally, lateral elbow tendinosis bilaterally; left with partial thickness change medial elbow tendon.      MRI of right elbow without  contrast 10/29/2024 2:02 PM     History: Evaluate medial elbow pain after injury; Elbow injury, right,  initial encounter     Techniques: Multiplanar multisequence imaging  through the right elbow  were obtained without administration of intravenous gadolinium  contrast.     Comparison: 11/14/2017     Findings:     Medial external marker placed approximately at the level of medial  humeral epicondyle.     Medial compartment  Ulnar collateral ligament: Intact.     Common flexor tendon: Intact with tendinosis.     Medial epicondyle: No edema.     Lateral compartment  Radial collateral ligament: Intact     Lateral ulnar collateral ligament: Intact.     Radial annular ligament: Intact.     Common extensor tendon: On a background of tendinosis, moderate grade  intrasubstance tear at the proximal attachment.     Lateral epicondyle: No edema.     Posterior compartment  Triceps: Intact without tendinosis.     Olecranon: No edema.     Bursitis: No significant fluid.     Anterior compartment  Biceps: Intact.     Brachialis: Mild fatty infiltration distally.     Bicipitoradial bursa: No significant fluid.     Articulations  No evidence of erosion. No significant effusion.     Bones (other than subarticular marrow): No evidence of fracture,  marrow contusion or marrow infiltrative change.     Others:     Nerves: Ulnar nerve and cubital tunnel are normal.                                                                         Impression:  1. Common flexor tendinosis without tear  2. On a background of tendinosis, moderate grade intrasubstance tear  of the common extensor tendons at the proximal attachment.     GUADALUPE GUIDRY     ============================================    MRI of left elbow without  contrast 10/29/2024 1:51 PM     History: Evaluate medial elbow pain after injury; Elbow injury, left,  initial encounter     Techniques: Multiplanar multisequence imaging through the left elbow  were obtained without administration of intravenous gadolinium  contrast.     Comparison: 10/21/2024     Findings:     Ulnar-sided external marker placed at the level of medial humeral  epicondyle.      Medial compartment  Ulnar collateral ligament: Intact.     Common flexor tendon: Corresponding to the external marker, on a  background of tendinosis, low-grade intrasubstance tear of the common  flexor tendon at the proximal attachment.     Medial epicondyle: No edema.     Lateral compartment  Radial collateral ligament: Intact.     Lateral ulnar collateral ligament:   Intact.      Radial annular ligament: Intact.     Common extensor tendon: On a background of tendinosis, tiny low-grade  intrasubstance tear of the common extensor at the proximal attachment.     Lateral epicondyle: No edema.     Posterior compartment  Triceps: Intact without tendinosis.     Olecranon: No edema.     Bursitis: No significant fluid.     Anterior compartment  Biceps: Intact.     Brachialis: Mild fatty infiltration distally.     Bicipitoradial bursa: No significant fluid.     Articulations  No evidence of erosion. No significant effusion.     Bones (other than subarticular marrow): No evidence of fracture,  marrow contusion or marrow infiltrative change.     Others:     Nerves: Ulnar nerve and cubital tunnel are normal.                                                                         Impression:  1. Corresponding to the external marker, on a background of  tendinosis, low-grade intrasubstance tear of the common flexor tendon  at the proximal attachment.  2. On a background of tendinosis, tiny low-grade intrasubstance tear  of the common extensor at the proximal attachment.     GUADALUPE GUIDRY           30 minutes spent by me on the date of the encounter doing chart review, history and exam, documentation and further activities per the note

## 2024-11-01 ENCOUNTER — OFFICE VISIT (OUTPATIENT)
Dept: ORTHOPEDICS | Facility: CLINIC | Age: 54
End: 2024-11-01
Payer: OTHER MISCELLANEOUS

## 2024-11-01 DIAGNOSIS — S59.901D ELBOW INJURY, RIGHT, SUBSEQUENT ENCOUNTER: ICD-10-CM

## 2024-11-01 DIAGNOSIS — M77.02 BILATERAL MEDIAL EPICONDYLITIS OF ELBOW JOINT: Primary | ICD-10-CM

## 2024-11-01 DIAGNOSIS — M77.01 BILATERAL MEDIAL EPICONDYLITIS OF ELBOW JOINT: Primary | ICD-10-CM

## 2024-11-01 DIAGNOSIS — S59.902D ELBOW INJURY, LEFT, SUBSEQUENT ENCOUNTER: ICD-10-CM

## 2024-11-01 PROCEDURE — 99214 OFFICE O/P EST MOD 30 MIN: CPT | Performed by: PEDIATRICS

## 2024-11-01 NOTE — LETTER
November 1, 2024      Grabiel Cotto  [address] [address] [place] [id number]        To Whom It May Concern:    Grabiel Cotto was seen in our clinic.   Due to ongoing symptoms after injury, with further treatment planned, remain off work at this time.  Anticipate update on work status in approximately 1 month.  Continue with therapy as well.      Sincerely,            Donald Bills

## 2024-11-01 NOTE — LETTER
11/1/2024      Grabiel Cotto  [address] [address] [place] [address]  [place] [address]      Dear Colleague,    Thank you for referring your patient, Grabiel Cotto, to the [facility] [place]. Please see a copy of my visit note below.    ASSESSMENT & PLAN    Nish was seen today for pain, follow up, pain and follow up.    Diagnoses and all orders for this visit:    Bilateral medial epicondylitis of elbow joint    Elbow injury, right, subsequent encounter    Elbow injury, left, subsequent encounter      He is relieved no surgery required for this.  I think continuing with therapy is a good approach.  Additional considerations around steroid injection (advise with US guidance), possibly PRP (again, with US guidance), possibly tenotomy may be considered in future, depending on course with therapy.  See below.  Questions answered. Discussed signs and symptoms that may indicate more serious issues; the patient was instructed to seek appropriate care if noted. Nish indicates understanding of these issues and agrees with the plan.      See Patient Instructions  Patient Instructions   Reviewed bilateral elbow MRI scans, demonstrating findings consistent with medial epicondylitis, or irritation and change at the medial elbow flexor tendons.  Incidentally, there is also some irregularity noted in the lateral elbow tendons as well.  We discussed continuing with activity modification, with updated work letter today.  Restrictions will remain in place through recheck, anticipated approximately 1 month.  Discussed continuing with hand therapy, and you can discuss with therapist whether additional more frequent visits may be beneficial.  Okay to continue with the supports that you have, including the forearm straps and the left wrist splint.  Also okay to try weaning some level of support for comfort, focusing on using devices more with activities if needed.  We discussed injections options, including one-time trial  of imaging-guided steroid injection, or potential trial of PRP injection with imaging guidance.  Defer these for now in favor of continuing therapy, though you can discuss at home and consider if desired.  Also discussed potential for discussion with one of my colleagues about tenotomy with Tenex.  For now, continue with therapy and recheck approximately 1 month.  Contact clinic or follow-up sooner if needed.    If you have any further questions for your physician or physician s care team you can contact them thru [employer] or by calling [phone].      Donald Bills, [facility]    SUBJECTIVE- Interim History October 31, 2024    Chief Complaint   Patient presents with     Left Elbow - Pain, Follow Up     Right Elbow - Pain, Follow Up       Grabiel Cotto is a 54 year old male who is seen in f/u up for    Elbow injury, right, subsequent encounter  Elbow injury, left, subsequent encounter. Since last visit on 10/21/24 patient has felt no difference with the elbows, is wearing the brace given from OT with the wrist on the left, as well as bilateral tennis elbow braces. Reports that pain is still located along flexor tendon origin and into forearm. Has one visit with OT in interim, next visit scheduled for 11/12/24.    Worker's comp injury     The patient is seen by themselves.  The patient is Right handed    Orthopedic/Surgical history: NO  Social History/Occupation:  for [employer]      REVIEW OF SYSTEMS:  Review of Systems    OBJECTIVE:       Wearing bilateral forearm strap and left forearm/wrist orthosis  Pain elbows with active motion bilaterally    RADIOLOGY:  Final results and radiologist's interpretation, available in the [facility] health record.  Images were reviewed with the patient in the office today.  My personal interpretation of the performed imaging: medial elbow tendinosis bilaterally, lateral elbow tendinosis bilaterally; left with  partial thickness change medial elbow tendon.      MRI of right elbow without  contrast 10/29/2024 2:02 PM     History: Evaluate medial elbow pain after injury; Elbow injury, right,  initial encounter     Techniques: Multiplanar multisequence imaging through the right elbow  were obtained without administration of intravenous gadolinium  contrast.     Comparison: 11/14/2017     Findings:     Medial external marker placed approximately at the level of medial  humeral epicondyle.     Medial compartment  Ulnar collateral ligament: Intact.     Common flexor tendon: Intact with tendinosis.     Medial epicondyle: No edema.     Lateral compartment  Radial collateral ligament: Intact     Lateral ulnar collateral ligament: Intact.     Radial annular ligament: Intact.     Common extensor tendon: On a background of tendinosis, moderate grade  intrasubstance tear at the proximal attachment.     Lateral epicondyle: No edema.     Posterior compartment  Triceps: Intact without tendinosis.     Olecranon: No edema.     Bursitis: No significant fluid.     Anterior compartment  Biceps: Intact.     Brachialis: Mild fatty infiltration distally.     Bicipitoradial bursa: No significant fluid.     Articulations  No evidence of erosion. No significant effusion.     Bones (other than subarticular marrow): No evidence of fracture,  marrow contusion or marrow infiltrative change.     Others:     Nerves: Ulnar nerve and cubital tunnel are normal.                                                                         Impression:  1. Common flexor tendinosis without tear  2. On a background of tendinosis, moderate grade intrasubstance tear  of the common extensor tendons at the proximal attachment.     GUADALUPE GUIDRY     ============================================    MRI of left elbow without  contrast 10/29/2024 1:51 PM     History: Evaluate medial elbow pain after injury; Elbow injury, left,  initial encounter     Techniques: Multiplanar  multisequence imaging through the left elbow  were obtained without administration of intravenous gadolinium  contrast.     Comparison: 10/21/2024     Findings:     Ulnar-sided external marker placed at the level of medial humeral  epicondyle.     Medial compartment  Ulnar collateral ligament: Intact.     Common flexor tendon: Corresponding to the external marker, on a  background of tendinosis, low-grade intrasubstance tear of the common  flexor tendon at the proximal attachment.     Medial epicondyle: No edema.     Lateral compartment  Radial collateral ligament: Intact.     Lateral ulnar collateral ligament:   Intact.      Radial annular ligament: Intact.     Common extensor tendon: On a background of tendinosis, tiny low-grade  intrasubstance tear of the common extensor at the proximal attachment.     Lateral epicondyle: No edema.     Posterior compartment  Triceps: Intact without tendinosis.     Olecranon: No edema.     Bursitis: No significant fluid.     Anterior compartment  Biceps: Intact.     Brachialis: Mild fatty infiltration distally.     Bicipitoradial bursa: No significant fluid.     Articulations  No evidence of erosion. No significant effusion.     Bones (other than subarticular marrow): No evidence of fracture,  marrow contusion or marrow infiltrative change.     Others:     Nerves: Ulnar nerve and cubital tunnel are normal.                                                                         Impression:  1. Corresponding to the external marker, on a background of  tendinosis, low-grade intrasubstance tear of the common flexor tendon  at the proximal attachment.  2. On a background of tendinosis, tiny low-grade intrasubstance tear  of the common extensor at the proximal attachment.     GUADALUPE GUIDRY           30 minutes spent by me on the date of the encounter doing chart review, history and exam, documentation and further activities per the note           Again, thank you for allowing me to  participate in the care of your patient.        Sincerely,        Donald Bills, DO

## 2024-11-01 NOTE — PATIENT INSTRUCTIONS
Reviewed bilateral elbow MRI scans, demonstrating findings consistent with medial epicondylitis, or irritation and change at the medial elbow flexor tendons.  Incidentally, there is also some irregularity noted in the lateral elbow tendons as well.  We discussed continuing with activity modification, with updated work letter today.  Restrictions will remain in place through recheck, anticipated approximately 1 month.  Discussed continuing with hand therapy, and you can discuss with therapist whether additional more frequent visits may be beneficial.  Okay to continue with the supports that you have, including the forearm straps and the left wrist splint.  Also okay to try weaning some level of support for comfort, focusing on using devices more with activities if needed.  We discussed injections options, including one-time trial of imaging-guided steroid injection, or potential trial of PRP injection with imaging guidance.  Defer these for now in favor of continuing therapy, though you can discuss at home and consider if desired.  Also discussed potential for discussion with one of my colleagues about tenotomy with Tenex.  For now, continue with therapy and recheck approximately 1 month.  Contact clinic or follow-up sooner if needed.    If you have any further questions for your physician or physician s care team you can contact them thru [employer] or by calling [phone].

## 2024-11-12 ENCOUNTER — THERAPY VISIT (OUTPATIENT)
Dept: OCCUPATIONAL THERAPY | Facility: CLINIC | Age: 54
End: 2024-11-12
Payer: OTHER MISCELLANEOUS

## 2024-11-12 DIAGNOSIS — M77.01 BILATERAL MEDIAL EPICONDYLITIS OF ELBOW JOINT: Primary | ICD-10-CM

## 2024-11-12 DIAGNOSIS — M77.02 BILATERAL MEDIAL EPICONDYLITIS OF ELBOW JOINT: Primary | ICD-10-CM

## 2024-11-12 PROCEDURE — 97140 MANUAL THERAPY 1/> REGIONS: CPT | Mod: GO

## 2024-11-12 PROCEDURE — 97035 APP MDLTY 1+ULTRASOUND EA 15: CPT | Mod: GO

## 2024-11-19 ENCOUNTER — THERAPY VISIT (OUTPATIENT)
Dept: OCCUPATIONAL THERAPY | Facility: CLINIC | Age: 54
End: 2024-11-19
Payer: COMMERCIAL

## 2024-11-19 DIAGNOSIS — M77.01 BILATERAL MEDIAL EPICONDYLITIS OF ELBOW JOINT: Primary | ICD-10-CM

## 2024-11-19 DIAGNOSIS — M77.02 BILATERAL MEDIAL EPICONDYLITIS OF ELBOW JOINT: Primary | ICD-10-CM

## 2024-11-19 PROCEDURE — 97140 MANUAL THERAPY 1/> REGIONS: CPT | Mod: GO

## 2024-11-19 PROCEDURE — 97035 APP MDLTY 1+ULTRASOUND EA 15: CPT | Mod: GO

## 2024-11-19 PROCEDURE — 97112 NEUROMUSCULAR REEDUCATION: CPT | Mod: GO

## 2024-11-25 ENCOUNTER — TELEPHONE (OUTPATIENT)
Dept: FAMILY MEDICINE | Facility: CLINIC | Age: 54
End: 2024-11-25
Payer: COMMERCIAL

## 2024-11-25 NOTE — TELEPHONE ENCOUNTER
Patient Quality Outreach    Patient is due for the following:   Hypertension -  BP check and Hypertension follow-up visit    Action(s) Taken:   Patient has upcoming appointment, these items will be addressed at that time.    Type of outreach:    Chart review performed, no outreach needed.    Questions for provider review:    None           Eli Benitez MA

## 2024-11-26 ENCOUNTER — THERAPY VISIT (OUTPATIENT)
Dept: OCCUPATIONAL THERAPY | Facility: CLINIC | Age: 54
End: 2024-11-26
Payer: OTHER MISCELLANEOUS

## 2024-11-26 DIAGNOSIS — M77.01 BILATERAL MEDIAL EPICONDYLITIS OF ELBOW JOINT: Primary | ICD-10-CM

## 2024-11-26 DIAGNOSIS — M77.02 BILATERAL MEDIAL EPICONDYLITIS OF ELBOW JOINT: Primary | ICD-10-CM

## 2024-11-26 PROCEDURE — 97035 APP MDLTY 1+ULTRASOUND EA 15: CPT | Mod: GO

## 2024-11-26 PROCEDURE — 97140 MANUAL THERAPY 1/> REGIONS: CPT | Mod: GO

## 2024-12-03 ENCOUNTER — THERAPY VISIT (OUTPATIENT)
Dept: OCCUPATIONAL THERAPY | Facility: CLINIC | Age: 54
End: 2024-12-03
Payer: OTHER MISCELLANEOUS

## 2024-12-03 DIAGNOSIS — M77.02 BILATERAL MEDIAL EPICONDYLITIS OF ELBOW JOINT: Primary | ICD-10-CM

## 2024-12-03 DIAGNOSIS — M77.01 BILATERAL MEDIAL EPICONDYLITIS OF ELBOW JOINT: Primary | ICD-10-CM

## 2024-12-03 PROCEDURE — 97110 THERAPEUTIC EXERCISES: CPT | Mod: GO

## 2024-12-03 NOTE — PROGRESS NOTES
"   12/03/24 0500   Appointment Info   Treating Provider JUNITO Womack Jennifer Leaser, OTR/L   Total/Authorized Visits 8 (WC)   Visits Used 5   Medical Diagnosis B MEP   OT Tx Diagnosis B MEP   Progress Note/Certification   Onset of Illness/Injury or Date of Surgery 09/20/24   Therapy Frequency 1x weekly, tapering   Predicted Duration 12 weeks   Progress Note Due Date 12/03/24   Progress Note Completed Date 10/22/24   Goals   OT Goals 1   OT Goal 1   Goal Identifier Lifting   Goal Description Pt will report no pain when lifting a grocery bag to waist level   Rationale In order to maximize safety and independence with ADL/IADLs   Goal Progress 6/10 pain   Target Date 01/14/25   Subjective Report   Subjective Report \"Same as before. My left one feels like it's never going to get better. The leaning away from wall really aggravates both arms. I think they're going to send me back to work. I may have to cancel my next appts. They're not going to pay.\"   Objective Measures   Objective Measures Hand Obj Measures;Objective Measure 1   Hand Objective Measures Neural Tension Testing;ROM;Resisted Testing;Strength   Neural Tension Testing UNT   Resisted Testing Elbow Extension;Elbow Flexion;Wrist Ext with RD (Elbow at Side);Wrist Flex with RD (Elbow at Side)   ROM Wrist ROM   Strength    Objective Measure 1   Objective Measure Pain scale:   Details R: 4/10 L: 6/10   Wrist ROM    Extension L: 61 R: 61   Flexion L: 57 R: 68   Radial Deviation (RD) L: 28 R: 14   Ulnar Deviation (UD) L: 23 R: 31   UNT: Ulnar Neurodynamic Test (based on DS Wiseman's ULNT)   0-5 Scale L: 3 R: 2   Resisted Testing   Elbow Extension L: 5/5   Elbow Flexion L: 5/5+   Wrist Ext with RD, Elbow at Side L: 5/5+ R: 5/5   Wrist Flex with RD, Elbow at Side L: 5/5+    (measured in pounds)    Average Strength L: 65 R: 67   Treatment Interventions (OT)   Interventions Therapeutic Procedure/Exercise   Neuromuscular Re-education   PTRx Neuro Re-ed " 1 Nerve Gliding Distal Ulnar    PTRx Neuro Re-ed 1 - Details HEP    Therapeutic Procedure/Exercise   Therapeutic Procedure: strength, endurance, ROM, flexibillity minutes (43229) 29   PTRx Ther Proc 1 Forearm Passive Range of Motion Flexor Stretch   PTRx Ther Proc 1 - Details HEP    PTRx Ther Proc 2 Wrist Strengthening Eccentric Flexion with Dumb Bell   PTRx Ther Proc 2 - Details HEP    PTRx Ther Proc 3 Wrist Strengthening Isometric Flexion   PTRx Ther Proc 3 - Details HEP    PTRx Ther Proc 4 Wrist Strengthening Isotonic Flexion   PTRx Ther Proc 4 - Details HEP    PTRx Ther Proc 5 Wrist Stabilization Wall Push Ups on Fists   PTRx Ther Proc 5 - Details HEP    PTRx Ther Proc 6 Wrist Stabilization Alternate Pull Backs   PTRx Ther Proc 6 - Details 10-30 reps 3-4 x day reviewed HEP   Skilled Intervention Progress note   Patient Response/Progress Pt progressing with therapy. R is progressing faster than L. Pt requests to continue therapy due to potential room for improvement. May need to return to work, which could interrupt therapy appts.   Ther Proc 2 Water jug   Ther Proc 2 - Details Reviewed HEP   Therapeutic Procedures Ther Proc 2   Ther Proc 1 Progress note   Ther Proc 1 - Details See objective data gathered for today's progress note   Therapeutic Activity   PTRx Ther Act 1 TENNIS ELBOW PREVENTION   PTRx Ther Act 1 - Details HEP    PTRx Ther Act 2 Warmth   PTRx Ther Act 2 - Details HEP    PTRx Ther Act 3 Ball Massage to Flexors   PTRx Ther Act 3 - Details HEP    Orthotics   Comments cont wear   Education   Learner/Method Patient;Demonstration;Pictures/Video   Education Comments PTRX via printout   Plan   Home program See PTRX   Updates to plan of care Pt may need to return to work which may interrupt therapy appts.   Plan for next session US, STM, nerve gliding, progress per pain tolerance   Total Session Time   Timed Code Treatment Minutes 29   Total Treatment Time (sum of timed and untimed services) 29     Upper  Extremity Functional Index Score:  SCORE:   Column Totals: /80: 43   (A lower score indicates greater disability.)    PLAN  Continue therapy per current plan of care.    Beginning/End Dates of Progress Note Reporting Period:  10/22/24 to 12/03/2024    Referring Provider:  Kristen M Kehr

## 2024-12-10 ENCOUNTER — THERAPY VISIT (OUTPATIENT)
Dept: OCCUPATIONAL THERAPY | Facility: CLINIC | Age: 54
End: 2024-12-10
Payer: OTHER MISCELLANEOUS

## 2024-12-10 DIAGNOSIS — M77.01 BILATERAL MEDIAL EPICONDYLITIS OF ELBOW JOINT: Primary | ICD-10-CM

## 2024-12-10 DIAGNOSIS — M77.02 BILATERAL MEDIAL EPICONDYLITIS OF ELBOW JOINT: Primary | ICD-10-CM

## 2024-12-10 PROCEDURE — 97110 THERAPEUTIC EXERCISES: CPT | Mod: GO

## 2024-12-10 PROCEDURE — 97112 NEUROMUSCULAR REEDUCATION: CPT | Mod: GO

## 2024-12-10 PROCEDURE — 97140 MANUAL THERAPY 1/> REGIONS: CPT | Mod: GO

## 2024-12-17 ENCOUNTER — THERAPY VISIT (OUTPATIENT)
Dept: OCCUPATIONAL THERAPY | Facility: CLINIC | Age: 54
End: 2024-12-17
Payer: OTHER MISCELLANEOUS

## 2024-12-17 DIAGNOSIS — M77.02 BILATERAL MEDIAL EPICONDYLITIS OF ELBOW JOINT: Primary | ICD-10-CM

## 2024-12-17 DIAGNOSIS — M77.01 BILATERAL MEDIAL EPICONDYLITIS OF ELBOW JOINT: Primary | ICD-10-CM

## 2024-12-17 PROCEDURE — 97112 NEUROMUSCULAR REEDUCATION: CPT | Mod: GO

## 2024-12-17 PROCEDURE — 97140 MANUAL THERAPY 1/> REGIONS: CPT | Mod: GO

## 2024-12-31 ENCOUNTER — THERAPY VISIT (OUTPATIENT)
Dept: OCCUPATIONAL THERAPY | Facility: CLINIC | Age: 54
End: 2024-12-31
Payer: OTHER MISCELLANEOUS

## 2024-12-31 DIAGNOSIS — M77.01 BILATERAL MEDIAL EPICONDYLITIS OF ELBOW JOINT: Primary | ICD-10-CM

## 2024-12-31 DIAGNOSIS — M77.02 BILATERAL MEDIAL EPICONDYLITIS OF ELBOW JOINT: Primary | ICD-10-CM

## 2024-12-31 PROCEDURE — 97110 THERAPEUTIC EXERCISES: CPT | Mod: GO

## 2024-12-31 NOTE — PROGRESS NOTES
"   12/31/24 0500   Appointment Info   Treating Provider Crystal Mock, OTR/L   Total/Authorized Visits 8 (WC)   Visits Used 8   Medical Diagnosis B MEP   OT Tx Diagnosis B MEP   Progress Note/Certification   Onset of Illness/Injury or Date of Surgery 09/20/24   Therapy Frequency 1x weekly, tapering   Predicted Duration 12 weeks   Progress Note Due Date 01/15/24   Progress Note Completed Date 12/31/24   Goals   OT Goals 1   OT Goal 1   Goal Identifier Lifting   Goal Description Pt will report no pain when lifting a grocery bag to waist level   Rationale In order to maximize safety and independence with ADL/IADLs   Goal Progress 3/10 pain   Target Date 01/14/25   Subjective Report   Subjective Report \"The right arm is better. Pulling and lifting is different than pushing. Pushing is worse. I can feel it then, but it's not all the time. I feel like I can't feel the difference between the stretching and the pain on the left side. It feels better when I heat it up first. I can do more with the left arm. The pushing and twisting is not good on the left. I'm using the brace as leverage when I use the left arm. I tried wearing the elbow sleeping brace at night. I can't sleep. I had the tape on 3 times within the last week and that helps.\"   Objective Measures   Objective Measures Hand Obj Measures;Objective Measure 1   Hand Objective Measures Neural Tension Testing;ROM;Resisted Testing;Strength   Neural Tension Testing UNT   Resisted Testing Elbow Extension;Elbow Flexion;Wrist Ext with RD (Elbow at Side);Wrist Flex with RD (Elbow at Side)   ROM Wrist ROM   Strength    Objective Measure 1   Objective Measure Pain Scale   Details 6-7/10 pain when I use it more than I should; 0/10 pain at rest   Wrist ROM    Extension R: 65 L: 61   Flexion R: 73 L: 57   Radial Deviation (RD) R: 20 L: 22   Ulnar Deviation (UD) R: 33 L: 31    (measured in pounds)    Average Strength R: 100 L: 76   Treatment Interventions (OT) "   Interventions Therapeutic Procedure/Exercise;Neuromuscular Re-education;Manual Therapy   Neuromuscular Re-education   PTRx Neuro Re-ed 1 Nerve Gliding Distal Ulnar    PTRx Neuro Re-ed 1 - Details HEP    Therapeutic Procedure/Exercise   Therapeutic Procedure: strength, endurance, ROM, flexibillity minutes ([id number]) 23   Therapeutic Procedures Ther Proc 3   Ther Proc 1 Ulnar sleeping brace   Ther Proc 1 - Details Reinforced wear time - can alternatively wear during the daytime with stays removed.   Ther Proc 2 Water jug   Ther Proc 2 - Details HEP   PTRx Ther Proc 1 Forearm Passive Range of Motion Flexor Stretch   PTRx Ther Proc 1 - Details HEP    PTRx Ther Proc 2 Wrist Strengthening Isometric Flexion   PTRx Ther Proc 2 - Details HEP    PTRx Ther Proc 3 Wrist Stabilization Alternate Pull Backs   PTRx Ther Proc 3 - Details HEP   PTRx Ther Proc 4 Wrist Stabilization Gyro Exerciser   PTRx Ther Proc 4 - Details Reviewed HEP    Skilled Intervention To track therapy progress   Patient Response/Progress Pt reports that they are ready to independently manage symptoms. Pt will return to therapy, as needed.   Ther Proc 3 Discharge Note   Ther Proc 3 - Details Please refer to the objective data gathered for today's discharge note.   Therapeutic Activity   PTRx Ther Act 1 TENNIS ELBOW PREVENTION   PTRx Ther Act 1 - Details HEP    PTRx Ther Act 2 Warmth   PTRx Ther Act 2 - Details HEP    PTRx Ther Act 3 Ball Massage to Flexors   PTRx Ther Act 3 - Details HEP    Orthotics   Comments cont wear   Education   Learner/Method Patient;Demonstration;Pictures/Video   Education Comments PTRX via printout   Plan   Home program See PTRX   Updates to plan of care Appt next week, then every other week.   Plan for next session US, STM, nerve gliding, progress per pain tolerance   Total Session Time   Timed Code Treatment Minutes 23   Total Treatment Time (sum of timed and untimed services) 23     DISCHARGE  Reason for Discharge: Patient chooses  to discontinue therapy.    Discharge Plan: Patient to continue home program.    Referring Provider:  Kristen M Kehr

## 2025-01-02 NOTE — PROGRESS NOTES
"  Assessment & Plan     Class 1 obesity due to excess calories with serious comorbidity and body mass index (BMI) of 32.0 to 32.9 in adult  Ongoing   - tirzepatide-Weight Management (ZEPBOUND) 2.5 MG/0.5ML prefilled pen; Inject 0.5 mLs (2.5 mg) subcutaneously every 7 days.  Asking for an appetite suppressant. Wife is on zepbound and doing quite well. Will send over script, expected course of disease discussed with patient.  Side effects of medications reviewed  Will help to have someone familiar with it in house for support as needed.  Continue to work out, eat well. Patient understands    Essential hypertension  Stable  Previously was elevated a few visits although likely increase in pain. Normal in clinic today and not having symptoms. I suspect it will remain fairly well controlled as long as pain control is good although will monitor. Can increase losartan as needed    Mixed hyperglyceridemia  Continue to work on lifestyle      The longitudinal plan of care for the diagnosis(es)/condition(s) as documented were addressed during this visit. Due to the added complexity in care, I will continue to support Nish in the subsequent management and with ongoing continuity of care.      BMI  Estimated body mass index is 32.01 kg/m  as calculated from the following:    Height as of this encounter: 1.93 m (6' 4\").    Weight as of this encounter: 119.3 kg (263 lb).   Weight management plan: Discussed healthy diet and exercise guidelines      Follow up for annual visit    Subjective   Nish is a 54 year old, presenting for the following health issues:  Hypertension        1/8/2025     4:39 PM   Additional Questions   Roomed by Eli Benitez MA   Accompanied by Self     History of Present Illness       Hyperlipidemia:  He presents for follow up of hyperlipidemia.   He is taking medication to lower cholesterol. He is not having myalgia or other side effects to statin medications.    Hypertension: He presents for follow up of " "hypertension.  He does not check blood pressure  regularly outside of the clinic. Outpatient blood pressures have not been over 140/90. He does not follow a low salt diet.     He eats 2-3 servings of fruits and vegetables daily.He consumes 0 sweetened beverage(s) daily.He exercises with enough effort to increase his heart rate 9 or less minutes per day.  He exercises with enough effort to increase his heart rate 3 or less days per week.   He is taking medications regularly.           Review of Systems  Constitutional, neuro, ENT, endocrine, pulmonary, cardiac, gastrointestinal, genitourinary, musculoskeletal, integument and psychiatric systems are negative, except as otherwise noted.      Objective    /81   Pulse 84   Temp 97.5  F (36.4  C) (Tympanic)   Resp 14   Ht 1.93 m (6' 4\")   Wt 119.3 kg (263 lb)   SpO2 97%   BMI 32.01 kg/m    Body mass index is 32.01 kg/m .    Physical Exam   GENERAL: alert and no distress  EYES: Eyes grossly normal to inspection, PERRL and conjunctivae and sclerae normal  SKIN: no suspicious lesions or rashes  PSYCH: mentation appears normal, affect normal/bright            Signed Electronically by: Jai Cancino PA-C      "

## 2025-01-08 ENCOUNTER — OFFICE VISIT (OUTPATIENT)
Dept: FAMILY MEDICINE | Facility: CLINIC | Age: 55
End: 2025-01-08
Payer: COMMERCIAL

## 2025-01-08 VITALS
SYSTOLIC BLOOD PRESSURE: 132 MMHG | BODY MASS INDEX: 32.03 KG/M2 | WEIGHT: 263 LBS | DIASTOLIC BLOOD PRESSURE: 81 MMHG | OXYGEN SATURATION: 97 % | HEART RATE: 84 BPM | HEIGHT: 76 IN | TEMPERATURE: 97.5 F | RESPIRATION RATE: 14 BRPM

## 2025-01-08 DIAGNOSIS — E66.09 CLASS 1 OBESITY DUE TO EXCESS CALORIES WITH SERIOUS COMORBIDITY AND BODY MASS INDEX (BMI) OF 32.0 TO 32.9 IN ADULT: Primary | ICD-10-CM

## 2025-01-08 DIAGNOSIS — I10 ESSENTIAL HYPERTENSION: ICD-10-CM

## 2025-01-08 DIAGNOSIS — E66.811 CLASS 1 OBESITY DUE TO EXCESS CALORIES WITH SERIOUS COMORBIDITY AND BODY MASS INDEX (BMI) OF 32.0 TO 32.9 IN ADULT: Primary | ICD-10-CM

## 2025-01-08 DIAGNOSIS — E78.3 MIXED HYPERGLYCERIDEMIA: ICD-10-CM

## 2025-01-08 PROBLEM — U07.1 2019 NOVEL CORONAVIRUS DISEASE (COVID-19): Status: RESOLVED | Noted: 2020-08-13 | Resolved: 2025-01-08

## 2025-01-08 PROBLEM — R19.5 DARK STOOLS: Status: RESOLVED | Noted: 2020-07-13 | Resolved: 2025-01-08

## 2025-01-08 PROCEDURE — G2211 COMPLEX E/M VISIT ADD ON: HCPCS | Performed by: PHYSICIAN ASSISTANT

## 2025-01-08 PROCEDURE — 99214 OFFICE O/P EST MOD 30 MIN: CPT | Performed by: PHYSICIAN ASSISTANT

## 2025-01-08 ASSESSMENT — PAIN SCALES - GENERAL: PAINLEVEL_OUTOF10: NO PAIN (0)

## 2025-01-16 ENCOUNTER — MYC REFILL (OUTPATIENT)
Dept: FAMILY MEDICINE | Facility: CLINIC | Age: 55
End: 2025-01-16
Payer: COMMERCIAL

## 2025-01-16 ENCOUNTER — TELEPHONE (OUTPATIENT)
Dept: FAMILY MEDICINE | Facility: CLINIC | Age: 55
End: 2025-01-16
Payer: COMMERCIAL

## 2025-01-16 DIAGNOSIS — E66.09 CLASS 1 OBESITY DUE TO EXCESS CALORIES WITH SERIOUS COMORBIDITY AND BODY MASS INDEX (BMI) OF 32.0 TO 32.9 IN ADULT: ICD-10-CM

## 2025-01-16 DIAGNOSIS — E66.811 CLASS 1 OBESITY DUE TO EXCESS CALORIES WITH SERIOUS COMORBIDITY AND BODY MASS INDEX (BMI) OF 32.0 TO 32.9 IN ADULT: ICD-10-CM

## 2025-01-16 NOTE — TELEPHONE ENCOUNTER
Prior Authorization Retail Medication Request    Medication/Dose: tirzepatide-Weight Management (ZEPBOUND) 2.5 MG/0.5ML prefilled pen  Diagnosis and ICD code (if different than what is on RX):  Class 1 obesity due to excess calories with serious comorbidity and body mass index (BMI) of 32.0 to 32.9 in adult [E66.811, E66.09, Z68.32]   New/renewal/insurance change PA/secondary ins. PA:  Previously Tried and Failed:    Rationale:      Insurance   Primary: [employer] fully insured  Insurance ID:  [id number]    Secondary (if applicable):  Insurance ID:      Pharmacy Information (if different than what is on RX)  Name:    Phone:    Fax:    Clinic Information  Preferred routing pool for dept communication: [facility] Pool

## 2025-01-16 NOTE — TELEPHONE ENCOUNTER
Disregard last message - just received a fax for a prior auth for Zepbound. I will enter it in and route to the PA team.    Thanks.  Leighton CARDOZO  Patient Registration/  [phone]

## 2025-01-20 NOTE — TELEPHONE ENCOUNTER
PRIOR AUTHORIZATION DENIED    Medication: tirzepatide-Weight Management (ZEPBOUND) 2.5 MG/0.5ML prefilled pen    Denial Date: 1/20/2025    Denial Rational:  Per insurance, medication is excluded from patient's benefit plan and will not be covered. Review and appeal are not available because of this exclusion.            Appeal Information: N/A

## 2025-02-10 ENCOUNTER — OFFICE VISIT (OUTPATIENT)
Dept: ORTHOPEDICS | Facility: CLINIC | Age: 55
End: 2025-02-10
Payer: OTHER MISCELLANEOUS

## 2025-02-10 ENCOUNTER — MEDICAL CORRESPONDENCE (OUTPATIENT)
Dept: HEALTH INFORMATION MANAGEMENT | Facility: CLINIC | Age: 55
End: 2025-02-10
Payer: COMMERCIAL

## 2025-02-10 DIAGNOSIS — S59.901D ELBOW INJURY, RIGHT, SUBSEQUENT ENCOUNTER: ICD-10-CM

## 2025-02-10 DIAGNOSIS — S59.902D ELBOW INJURY, LEFT, SUBSEQUENT ENCOUNTER: ICD-10-CM

## 2025-02-10 DIAGNOSIS — M77.01 BILATERAL MEDIAL EPICONDYLITIS OF ELBOW JOINT: Primary | ICD-10-CM

## 2025-02-10 DIAGNOSIS — M77.02 BILATERAL MEDIAL EPICONDYLITIS OF ELBOW JOINT: Primary | ICD-10-CM

## 2025-02-10 PROCEDURE — 99214 OFFICE O/P EST MOD 30 MIN: CPT | Performed by: PEDIATRICS

## 2025-02-10 NOTE — PATIENT INSTRUCTIONS
Good to hear of some overall improvement.  With some ongoing symptoms, we discussed the potential benefit from a work hardening program, also given overall duration of this episode with injury and recovery, along with physical demands of job.  Work hardening referral placed.  Updated work letter, advancing some activities given some improvement. 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  Plan recheck ~6 weeks, sooner if needed.    If you have any further questions for your physician or physician s care team you can contact them thru [employer] or by calling [phone].

## 2025-02-10 NOTE — LETTER
February 10, 2025      Grabiel Cotto  [address] [address] [place] [id number]        To Whom It May Concern:    Grabiel Cotto was seen in our clinic for recheck.   Return to work on next scheduled work date with light duty restrictions as noted:    Left upper extremity:  Lift/carry/push/pull up to 5 lbs  Try to keep work close to body    Right upper extremity:  Lift/carry/push/pull up to 10 lbs  Try to keep work close to body    Ok for driving smaller vehicles; advise against driving commercial truck    Anticipate update on work status in approximately 6 weeks, recheck around that time, sooner if needed.  Referred for work hardening program.      Sincerely,            Donald Bills

## 2025-02-10 NOTE — LETTER
2/10/2025      Grabiel Cotto  [address] [address] [place] [address]  [place] [address]      Dear Colleague,    Thank you for referring your patient, Grabiel Cotto, to the [facility]. Please see a copy of my visit note below.    ASSESSMENT & PLAN    Nish was seen today for follow up, pain, follow up and pain.    Diagnoses and all orders for this visit:    Bilateral medial epicondylitis of elbow joint  -     Physical Therapy  Referral; Future    Elbow injury, right, subsequent encounter  -     Physical Therapy  Referral; Future    Elbow injury, left, subsequent encounter  -     Physical Therapy  Referral; Future      Improving overall. Continues with home exercises from hand therapy.  Anticipate continued improvement.  He had questions about upcoming [facility]ARA; will await that evaluation and report.  Otherwise, discussed work hardening, as progress has been somewhat slow, and with some apprehension about getting back into work. Work hardening referral placed.  See below.  Questions answered. Discussed signs and symptoms that may indicate more serious issues; the patient was instructed to seek appropriate care if noted. Nish indicates understanding of these issues and agrees with the plan.      See Patient Instructions  Patient Instructions   Good to hear of some overall improvement.  With some ongoing symptoms, we discussed the potential benefit from a work hardening program, also given overall duration of this episode with injury and recovery, along with physical demands of job.  Work hardening referral placed.  Updated work letter, advancing some activities given some improvement. 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  Plan recheck ~6 weeks, sooner if needed.    If you have any further questions for your physician  "or physician s care team you can contact them thru [employer] or by calling [phone].      Donald Bills [facility] [place]    SUBJECTIVE- Interim History February 10, 2025    Chief Complaint   Patient presents with     Left Elbow - Follow Up, Pain     Right Elbow - Follow Up, Pain       Grabiel Cotto is a 54 year old male who is seen in f/u up for    Elbow injury, right, subsequent encounter  Elbow injury, left, subsequent encounter  Bilateral medial epicondylitis of elbow joint. Since last visit on 1/3/25 patient has stopped wearing the brace for the left elbow, noting he stopped 2/1/2025. Notices that he has felt more hypersensitive with the elbow brace being removed. Notes that pain is in the forearms more as compared to elbows. Does note that he is doing better, but reports fatigue in the forearms.     Right arm is no issue with the 5lb weight, left arm is primarily the concern.    - Now ~ 4 months from initial onset     The patient is seen with a QRC (Qualified Rehabilitation Consultant).    The patient is Right handed     Orthopedic/Surgical history: NO  Social History/Occupation:  for [employer]    **  Above information per rooming staff.  Additional history:  Stopped wearing wrist brace on 2/1.  Doing kettle bell lifts, 5 lbs. Able to lift more bilaterally, doing milk just ~5 lbs on left; feels like on right can do more, ~10 lbs.  Feels like perhaps the brace was more of a \"crutch\" for period of time.    Did work hardening program from shoulder injury in past. That worked well for the shoulder.    DHARA scheduled for 2/24/25.      REVIEW OF SYSTEMS:  Review of Systems    OBJECTIVE:       Elbows with grossly full ROM  He can feel some proximal volar forearm stretch with some positions (elbow fully extended, then forearm rotation)      RADIOLOGY:  None new. See previous notes.          30 minutes spent by me on the date of the encounter " doing chart review, history and exam, documentation and further activities per the note           Again, thank you for allowing me to participate in the care of your patient.        Sincerely,        Donald Bills, DO    Electronically signed

## 2025-02-10 NOTE — PROGRESS NOTES
ASSESSMENT & PLAN    Nish was seen today for follow up, pain, follow up and pain.    Diagnoses and all orders for this visit:    Bilateral medial epicondylitis of elbow joint  -     Physical Therapy  Referral; Future    Elbow injury, right, subsequent encounter  -     Physical Therapy  Referral; Future    Elbow injury, left, subsequent encounter  -     Physical Therapy  Referral; Future      Improving overall. Continues with home exercises from hand therapy.  Anticipate continued improvement.  He had questions about upcoming DHARA; will await that evaluation and report.  Otherwise, discussed work hardening, as progress has been somewhat slow, and with some apprehension about getting back into work. Work hardening referral placed.  See below.  Questions answered. Discussed signs and symptoms that may indicate more serious issues; the patient was instructed to seek appropriate care if noted. Nish indicates understanding of these issues and agrees with the plan.      See Patient Instructions  Patient Instructions   Good to hear of some overall improvement.  With some ongoing symptoms, we discussed the potential benefit from a work hardening program, also given overall duration of this episode with injury and recovery, along with physical demands of job.  Work hardening referral placed.  Updated work letter, advancing some activities given some improvement. 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  Plan recheck ~6 weeks, sooner if needed.    If you have any further questions for your physician or physician s care team you can contact them thru MyChart or by calling [phone].      Donald Bills DO  [facility]    SUBJECTIVE- Interim History February 10, 2025    Chief Complaint   Patient presents  "with    Left Elbow - Follow Up, Pain    Right Elbow - Follow Up, Pain       Grabiel Cotto is a 54 year old male who is seen in f/u up for    Elbow injury, right, subsequent encounter  Elbow injury, left, subsequent encounter  Bilateral medial epicondylitis of elbow joint. Since last visit on 1/3/25 patient has stopped wearing the brace for the left elbow, noting he stopped 2/1/2025. Notices that he has felt more hypersensitive with the elbow brace being removed. Notes that pain is in the forearms more as compared to elbows. Does note that he is doing better, but reports fatigue in the forearms.     Right arm is no issue with the 5lb weight, left arm is primarily the concern.    - Now ~ 4 months from initial onset     The patient is seen with a QRC (Qualified Rehabilitation Consultant).    The patient is Right handed     Orthopedic/Surgical history: NO  Social History/Occupation:  for [employer]    **  Above information per rooming staff.  Additional history:  Stopped wearing wrist brace on 2/1.  Doing kettle bell lifts, 5 lbs. Able to lift more bilaterally, doing milk just ~5 lbs on left; feels like on right can do more, ~10 lbs.  Feels like perhaps the brace was more of a \"crutch\" for period of time.    Did work hardening program from shoulder injury in past. That worked well for the shoulder.    DHARA scheduled for 2/24/25.      REVIEW OF SYSTEMS:  Review of Systems    OBJECTIVE:       Elbows with grossly full ROM  He can feel some proximal volar forearm stretch with some positions (elbow fully extended, then forearm rotation)      RADIOLOGY:  None new. See previous notes.          30 minutes spent by me on the date of the encounter doing chart review, history and exam, documentation and further activities per the note       "

## 2025-02-17 ENCOUNTER — TELEPHONE (OUTPATIENT)
Dept: ORTHOPEDICS | Facility: CLINIC | Age: 55
End: 2025-02-17
Payer: COMMERCIAL

## 2025-02-18 NOTE — TELEPHONE ENCOUNTER
Called Nish for clarification on who/where this order should be sent.  No name and no address, just number was given.  Nish states that this is to be FAXed to [facility].  Dae Fisher, LAT, ATC

## 2025-03-18 ENCOUNTER — TRANSFERRED RECORDS (OUTPATIENT)
Dept: HEALTH INFORMATION MANAGEMENT | Facility: CLINIC | Age: 55
End: 2025-03-18
Payer: COMMERCIAL

## 2025-03-19 ENCOUNTER — OFFICE VISIT (OUTPATIENT)
Dept: ORTHOPEDICS | Facility: CLINIC | Age: 55
End: 2025-03-19
Payer: OTHER MISCELLANEOUS

## 2025-03-19 DIAGNOSIS — S59.902D ELBOW INJURY, LEFT, SUBSEQUENT ENCOUNTER: ICD-10-CM

## 2025-03-19 DIAGNOSIS — M77.02 BILATERAL MEDIAL EPICONDYLITIS OF ELBOW JOINT: Primary | ICD-10-CM

## 2025-03-19 DIAGNOSIS — S59.901D ELBOW INJURY, RIGHT, SUBSEQUENT ENCOUNTER: ICD-10-CM

## 2025-03-19 DIAGNOSIS — M77.01 BILATERAL MEDIAL EPICONDYLITIS OF ELBOW JOINT: Primary | ICD-10-CM

## 2025-03-19 PROCEDURE — 99213 OFFICE O/P EST LOW 20 MIN: CPT | Performed by: PEDIATRICS

## 2025-03-19 NOTE — PATIENT INSTRUCTIONS
Good to hear of continued improvement with the arm injuries.  Continue with work hardening.  With some improvement, will increase work abilities somewhat, though this may not end up affecting work overall as restrictions do remain.  Follow-up around completion of work hardening (in ~4-6 weeks), sooner if needed.    If you have any further questions for your physician or physician s care team you can contact them thru [employer] or by calling [phone].

## 2025-03-19 NOTE — LETTER
March 19, 2025      Grabiel Cotto  [address] [address] [place] [id number]        To Whom It May Concern:    Grabiel Cotto was seen in our clinic for recheck.   Return to work on next scheduled work date with light duty restrictions as noted:    Left upper extremity:  Lift/carry/push/pull up to 10 lbs  Try to keep work close to body    Right upper extremity:  Lift/carry/push/pull up to 15 lbs  Try to keep work close to body    Ok for driving smaller vehicles; advise against driving commercial truck    Anticipate update on work status in approximately 1 month, around time of completion of work hardening, recheck around that time, sooner if needed.        Sincerely,            Donald Bills

## 2025-03-19 NOTE — PROGRESS NOTES
"ASSESSMENT & PLAN    Nish was seen today for follow up, pain, follow up and pain.    Diagnoses and all orders for this visit:    Bilateral medial epicondylitis of elbow joint    Elbow injury, right, subsequent encounter    Elbow injury, left, subsequent encounter        See Patient Instructions  Patient Instructions   Good to hear of continued improvement with the arm injuries.  Continue with work hardening.  With some improvement, will increase work abilities somewhat, though this may not end up affecting work overall as restrictions do remain.  Follow-up around completion of work hardening (in ~4-6 weeks), sooner if needed.    If you have any further questions for your physician or physician s care team you can contact them thru [email or web address] or by calling [phone].      [facility]    SUBJECTIVE- Interim History March 19, 2025    Chief Complaint   Patient presents with    Left Elbow - Follow Up, Pain    Right Elbow - Follow Up, Pain       Grabiel Cotto is a 55 year old male who is seen in f/u up for Data Unavailable. Since last visit on 2/10/25 patient has felt that the elbows are doing better, still getting pain from \"finally doing things\", and not so much as a chronic achiness.     Therapy is noted as beneficial, feeling better and then the next day it is more bothersome. Reports that he is doing more with lifting and more work. Went from 2 hours a day to 4 hours a day. [facility] noted as improvement, noting that this is more beneficial than the OT.    Pain is no longer located medial epicondyle, rather, diffuse muscle soreness from rehabilitation.    - Now ~ 5 months from initial onset    [facility] noted as improvement, noting that this is more beneficial than the OT.    The patient is seen with a QRC (Qualified Rehabilitation Consultant).    **  Above information per rooming staff.  Additional history:  Overall " improving. Has some daily soreness, from the therapy workouts.  Will be going to 4 hours therapy 3-4 days/week.  Going right direction, feels like is improving.  Has DHARA upcoming 3/24/25.    **  Updated work hardening notes reviewed, to be scanned into chart.      REVIEW OF SYSTEMS:  Review of Systems    OBJECTIVE:      RADIOLOGY:  None new today.        25 minutes spent by me on the date of the encounter doing chart review, history and exam, documentation and further activities per the note

## 2025-03-19 NOTE — LETTER
"3/19/2025      Grabiel Cotto  [id number] [address] [place] [address]  [place] [id number]      Dear Colleague,    Thank you for referring your patient, Grabiel Cotto, to the [facility]. Please see a copy of my visit note below.    ASSESSMENT & PLAN    Nish was seen today for follow up, pain, follow up and pain.    Diagnoses and all orders for this visit:    Bilateral medial epicondylitis of elbow joint    Elbow injury, right, subsequent encounter    Elbow injury, left, subsequent encounter        See Patient Instructions  Patient Instructions   Good to hear of continued improvement with the arm injuries.  Continue with work hardening.  With some improvement, will increase work abilities somewhat, though this may not end up affecting work overall as restrictions do remain.  Follow-up around completion of work hardening (in ~4-6 weeks), sooner if needed.    If you have any further questions for your physician or physician s care team you can contact them thru [email or web address] or by calling [phone].      Donald Bills,   [facility]    SUBJECTIVE- Interim History March 19, 2025    Chief Complaint   Patient presents with     Left Elbow - Follow Up, Pain     Right Elbow - Follow Up, Pain       Grabiel Cotto is a 55 year old male who is seen in f/u up for [employer] Un[employer]. Since last visit on 2/10/25 patient has felt that the elbows are doing better, still getting pain from \"finally doing things\", and not so much as a chronic achiness.     Therapy is noted as beneficial, feeling better and then the next day it is more bothersome. Reports that he is doing more with lifting and more work. Went from 2 hours a day to 4 hours a day. [facility] noted as improvement, noting that this is more beneficial than the OT.    Pain is no longer located medial epicondyle, rather, diffuse muscle soreness from rehabilitation.    - Now ~ 5 months from initial " ramiro Fernandez Work Hardening PT noted as improvement, noting that this is more beneficial than the OT.    The patient is seen with a QRC (Qualified Rehabilitation Consultant).    **  Above information per rooming staff.  Additional history:  Overall improving. Has some daily soreness, from the therapy workouts.  Will be going to 4 hours therapy 3-4 days/week.  Going right direction, feels like is improving.  Has DHARA upcoming 3/24/25.    **  Updated work hardening notes reviewed, to be scanned into chart.      REVIEW OF SYSTEMS:  Review of Systems    OBJECTIVE:      RADIOLOGY:  None new today.        25 minutes spent by me on the date of the encounter doing chart review, history and exam, documentation and further activities per the note           Again, thank you for allowing me to participate in the care of your patient.        Sincerely,        Donald Bills, DO    Electronically signed

## 2025-04-03 ENCOUNTER — TRANSFERRED RECORDS (OUTPATIENT)
Dept: HEALTH INFORMATION MANAGEMENT | Facility: CLINIC | Age: 55
End: 2025-04-03
Payer: COMMERCIAL

## 2025-04-03 ENCOUNTER — PATIENT OUTREACH (OUTPATIENT)
Dept: CARE COORDINATION | Facility: CLINIC | Age: 55
End: 2025-04-03
Payer: COMMERCIAL

## 2025-04-17 ENCOUNTER — PATIENT OUTREACH (OUTPATIENT)
Dept: CARE COORDINATION | Facility: CLINIC | Age: 55
End: 2025-04-17
Payer: COMMERCIAL

## 2025-04-17 ENCOUNTER — TRANSFERRED RECORDS (OUTPATIENT)
Dept: HEALTH INFORMATION MANAGEMENT | Facility: CLINIC | Age: 55
End: 2025-04-17
Payer: COMMERCIAL

## 2025-04-21 DIAGNOSIS — E78.3 MIXED HYPERGLYCERIDEMIA: ICD-10-CM

## 2025-04-21 DIAGNOSIS — I10 ESSENTIAL HYPERTENSION: ICD-10-CM

## 2025-04-21 RX ORDER — LOSARTAN POTASSIUM 100 MG/1
100 TABLET ORAL DAILY
Qty: 90 TABLET | Refills: 0 | Status: SHIPPED | OUTPATIENT
Start: 2025-04-21

## 2025-04-21 RX ORDER — ATORVASTATIN CALCIUM 20 MG/1
20 TABLET, FILM COATED ORAL DAILY
Qty: 90 TABLET | Refills: 1 | Status: SHIPPED | OUTPATIENT
Start: 2025-04-21

## 2025-04-24 ENCOUNTER — TRANSFERRED RECORDS (OUTPATIENT)
Dept: HEALTH INFORMATION MANAGEMENT | Facility: CLINIC | Age: 55
End: 2025-04-24
Payer: COMMERCIAL

## 2025-04-30 ENCOUNTER — TRANSFERRED RECORDS (OUTPATIENT)
Dept: HEALTH INFORMATION MANAGEMENT | Facility: CLINIC | Age: 55
End: 2025-04-30

## 2025-06-21 ENCOUNTER — HEALTH MAINTENANCE LETTER (OUTPATIENT)
Age: 55
End: 2025-06-21

## 2025-07-28 NOTE — PROGRESS NOTES
{PROVIDER CHARTING PREFERENCE:366804}    Subjective   Nish is a 55 year old, presenting for the following health issues:  Groin Pain  {(!) Visit Details have not yet been documented.  Please enter Visit Details and then use this list to pull in documentation. (Optional):997831}  HPI      {MA/LPN/RN Pre-Provider Visit Orders- hCG/UA/Strep (Optional):660165}  {SUPERLIST (Optional):733358}  {additonal problems for provider to add (Optional):487129}    {ROS Picklists (Optional):814303}      Objective    There were no vitals taken for this visit.  There is no height or weight on file to calculate BMI.  Physical Exam   {Exam List (Optional):616219}    {Diagnostic Test Results (Optional):416004}        Signed Electronically by: Jai Cancino PA-C  {Email feedback regarding this note to [email or web address]   :473933}

## 2025-08-01 ENCOUNTER — OFFICE VISIT (OUTPATIENT)
Dept: FAMILY MEDICINE | Facility: CLINIC | Age: 55
End: 2025-08-01
Payer: COMMERCIAL

## 2025-08-01 DIAGNOSIS — Z11.4 SCREENING FOR HIV (HUMAN IMMUNODEFICIENCY VIRUS): ICD-10-CM

## 2025-08-01 DIAGNOSIS — I10 ESSENTIAL HYPERTENSION: Primary | ICD-10-CM

## 2025-08-01 DIAGNOSIS — Z13.6 SCREENING FOR CARDIOVASCULAR CONDITION: ICD-10-CM

## 2025-08-01 DIAGNOSIS — E78.3 MIXED HYPERGLYCERIDEMIA: ICD-10-CM

## 2025-08-01 DIAGNOSIS — Z11.59 NEED FOR HEPATITIS C SCREENING TEST: ICD-10-CM

## 2025-08-04 ENCOUNTER — PATIENT OUTREACH (OUTPATIENT)
Dept: CARE COORDINATION | Facility: CLINIC | Age: 55
End: 2025-08-04
Payer: COMMERCIAL

## 2025-08-06 ENCOUNTER — PATIENT OUTREACH (OUTPATIENT)
Dept: CARE COORDINATION | Facility: CLINIC | Age: 55
End: 2025-08-06
Payer: COMMERCIAL

## 2025-08-12 ENCOUNTER — MYC MEDICAL ADVICE (OUTPATIENT)
Dept: FAMILY MEDICINE | Facility: CLINIC | Age: 55
End: 2025-08-12
Payer: COMMERCIAL

## (undated) DEVICE — PREP CHLORAPREP 26ML TINTED ORANGE  260815

## (undated) DEVICE — SOL WATER IRRIG 1000ML BOTTLE 07139-09

## (undated) DEVICE — PAD CHUX UNDERPAD 23X24" 7136

## (undated) DEVICE — KIT ENDO FIRST STEP DISINFECTANT 200ML W/POUCH EP-4

## (undated) RX ORDER — FENTANYL CITRATE 50 UG/ML
INJECTION, SOLUTION INTRAMUSCULAR; INTRAVENOUS
Status: DISPENSED
Start: 2024-08-16

## (undated) RX ORDER — LIDOCAINE HYDROCHLORIDE 20 MG/ML
SOLUTION OROPHARYNGEAL
Status: DISPENSED
Start: 2020-09-25

## (undated) RX ORDER — FENTANYL CITRATE 50 UG/ML
INJECTION, SOLUTION INTRAMUSCULAR; INTRAVENOUS
Status: DISPENSED
Start: 2020-09-25

## (undated) RX ORDER — SIMETHICONE 40MG/0.6ML
SUSPENSION, DROPS(FINAL DOSAGE FORM)(ML) ORAL
Status: DISPENSED
Start: 2020-09-25